# Patient Record
Sex: FEMALE | Race: WHITE | Employment: UNEMPLOYED | URBAN - METROPOLITAN AREA
[De-identification: names, ages, dates, MRNs, and addresses within clinical notes are randomized per-mention and may not be internally consistent; named-entity substitution may affect disease eponyms.]

---

## 2019-07-15 ENCOUNTER — APPOINTMENT (OUTPATIENT)
Dept: GENERAL RADIOLOGY | Age: 69
End: 2019-07-15
Attending: EMERGENCY MEDICINE
Payer: COMMERCIAL

## 2019-07-15 ENCOUNTER — HOSPITAL ENCOUNTER (EMERGENCY)
Age: 69
Discharge: HOME | End: 2019-07-15
Attending: EMERGENCY MEDICINE
Payer: COMMERCIAL

## 2019-07-15 VITALS
RESPIRATION RATE: 19 BRPM | WEIGHT: 200 LBS | HEIGHT: 64 IN | TEMPERATURE: 98.9 F | DIASTOLIC BLOOD PRESSURE: 82 MMHG | SYSTOLIC BLOOD PRESSURE: 179 MMHG | OXYGEN SATURATION: 96 % | BODY MASS INDEX: 34.15 KG/M2 | HEART RATE: 88 BPM

## 2019-07-15 DIAGNOSIS — R11.2 NAUSEA AND VOMITING, INTRACTABILITY OF VOMITING NOT SPECIFIED, UNSPECIFIED VOMITING TYPE: ICD-10-CM

## 2019-07-15 DIAGNOSIS — E86.0 DEHYDRATION: ICD-10-CM

## 2019-07-15 DIAGNOSIS — R73.9 HYPERGLYCEMIA: Primary | ICD-10-CM

## 2019-07-15 LAB
ALBUMIN SERPL-MCNC: 4.1 G/DL (ref 3.5–5)
ALBUMIN/GLOB SERPL: 1 {RATIO} (ref 1.1–2.2)
ALP SERPL-CCNC: 92 U/L (ref 45–117)
ALT SERPL-CCNC: 25 U/L (ref 12–78)
ANION GAP SERPL CALC-SCNC: 11 MMOL/L (ref 5–15)
APPEARANCE UR: CLEAR
AST SERPL-CCNC: 26 U/L (ref 15–37)
BACTERIA URNS QL MICRO: NEGATIVE /HPF
BASE EXCESS BLDV CALC-SCNC: 1.8 MMOL/L
BASOPHILS # BLD: 0 K/UL (ref 0–0.1)
BASOPHILS NFR BLD: 0 % (ref 0–1)
BDY SITE: ABNORMAL
BILIRUB SERPL-MCNC: 0.8 MG/DL (ref 0.2–1)
BILIRUB UR QL: NEGATIVE
BUN SERPL-MCNC: 39 MG/DL (ref 6–20)
BUN/CREAT SERPL: 20 (ref 12–20)
CALCIUM SERPL-MCNC: 10.2 MG/DL (ref 8.5–10.1)
CHLORIDE SERPL-SCNC: 98 MMOL/L (ref 97–108)
CO2 SERPL-SCNC: 27 MMOL/L (ref 21–32)
COLOR UR: ABNORMAL
COMMENT, HOLDF: NORMAL
CREAT SERPL-MCNC: 1.93 MG/DL (ref 0.55–1.02)
DIFFERENTIAL METHOD BLD: ABNORMAL
EOSINOPHIL # BLD: 0 K/UL (ref 0–0.4)
EOSINOPHIL NFR BLD: 0 % (ref 0–7)
EPITH CASTS URNS QL MICRO: ABNORMAL /LPF
ERYTHROCYTE [DISTWIDTH] IN BLOOD BY AUTOMATED COUNT: 13.3 % (ref 11.5–14.5)
GLOBULIN SER CALC-MCNC: 4.3 G/DL (ref 2–4)
GLUCOSE BLD STRIP.AUTO-MCNC: 343 MG/DL (ref 65–100)
GLUCOSE BLD STRIP.AUTO-MCNC: 364 MG/DL (ref 65–100)
GLUCOSE SERPL-MCNC: 374 MG/DL (ref 65–100)
GLUCOSE UR STRIP.AUTO-MCNC: >1000 MG/DL
HCO3 BLDV-SCNC: 27 MMOL/L (ref 23–28)
HCT VFR BLD AUTO: 42.8 % (ref 35–47)
HGB BLD-MCNC: 13.8 G/DL (ref 11.5–16)
HGB UR QL STRIP: ABNORMAL
HYALINE CASTS URNS QL MICRO: ABNORMAL /LPF (ref 0–5)
IMM GRANULOCYTES # BLD AUTO: 0 K/UL (ref 0–0.04)
IMM GRANULOCYTES NFR BLD AUTO: 0 % (ref 0–0.5)
KETONES SERPL QL: NEGATIVE
KETONES UR QL STRIP.AUTO: 15 MG/DL
LEUKOCYTE ESTERASE UR QL STRIP.AUTO: NEGATIVE
LYMPHOCYTES # BLD: 1.2 K/UL (ref 0.8–3.5)
LYMPHOCYTES NFR BLD: 9 % (ref 12–49)
MAGNESIUM SERPL-MCNC: 1.5 MG/DL (ref 1.6–2.4)
MCH RBC QN AUTO: 29.1 PG (ref 26–34)
MCHC RBC AUTO-ENTMCNC: 32.2 G/DL (ref 30–36.5)
MCV RBC AUTO: 90.1 FL (ref 80–99)
MONOCYTES # BLD: 0.7 K/UL (ref 0–1)
MONOCYTES NFR BLD: 5 % (ref 5–13)
NEUTS SEG # BLD: 11 K/UL (ref 1.8–8)
NEUTS SEG NFR BLD: 86 % (ref 32–75)
NITRITE UR QL STRIP.AUTO: NEGATIVE
NRBC # BLD: 0 K/UL (ref 0–0.01)
NRBC BLD-RTO: 0 PER 100 WBC
PCO2 BLDV: 44 MMHG (ref 41–51)
PH BLDV: 7.41 [PH] (ref 7.32–7.42)
PH UR STRIP: 5.5 [PH] (ref 5–8)
PHOSPHATE SERPL-MCNC: 4.6 MG/DL (ref 2.6–4.7)
PLATELET # BLD AUTO: 224 K/UL (ref 150–400)
PMV BLD AUTO: 10.9 FL (ref 8.9–12.9)
PO2 BLDV: 30 MMHG (ref 25–40)
POTASSIUM SERPL-SCNC: 4 MMOL/L (ref 3.5–5.1)
PROT SERPL-MCNC: 8.4 G/DL (ref 6.4–8.2)
PROT UR STRIP-MCNC: 300 MG/DL
RBC # BLD AUTO: 4.75 M/UL (ref 3.8–5.2)
RBC #/AREA URNS HPF: ABNORMAL /HPF (ref 0–5)
SAMPLES BEING HELD,HOLD: NORMAL
SAO2 % BLDV: 58 % (ref 65–88)
SAO2% DEVICE SAO2% SENSOR NAME: ABNORMAL
SERVICE CMNT-IMP: ABNORMAL
SERVICE CMNT-IMP: ABNORMAL
SODIUM SERPL-SCNC: 136 MMOL/L (ref 136–145)
SP GR UR REFRACTOMETRY: 1.02 (ref 1–1.03)
SPECIMEN SITE: ABNORMAL
UR CULT HOLD, URHOLD: NORMAL
UROBILINOGEN UR QL STRIP.AUTO: 0.2 EU/DL (ref 0.2–1)
WBC # BLD AUTO: 12.9 K/UL (ref 3.6–11)
WBC URNS QL MICRO: ABNORMAL /HPF (ref 0–4)

## 2019-07-15 PROCEDURE — 96374 THER/PROPH/DIAG INJ IV PUSH: CPT

## 2019-07-15 PROCEDURE — 74011250636 HC RX REV CODE- 250/636: Performed by: EMERGENCY MEDICINE

## 2019-07-15 PROCEDURE — 85025 COMPLETE CBC W/AUTO DIFF WBC: CPT

## 2019-07-15 PROCEDURE — 80053 COMPREHEN METABOLIC PANEL: CPT

## 2019-07-15 PROCEDURE — 36415 COLL VENOUS BLD VENIPUNCTURE: CPT

## 2019-07-15 PROCEDURE — 81001 URINALYSIS AUTO W/SCOPE: CPT

## 2019-07-15 PROCEDURE — 96361 HYDRATE IV INFUSION ADD-ON: CPT

## 2019-07-15 PROCEDURE — 71045 X-RAY EXAM CHEST 1 VIEW: CPT

## 2019-07-15 PROCEDURE — 84100 ASSAY OF PHOSPHORUS: CPT

## 2019-07-15 PROCEDURE — 82962 GLUCOSE BLOOD TEST: CPT

## 2019-07-15 PROCEDURE — 82803 BLOOD GASES ANY COMBINATION: CPT

## 2019-07-15 PROCEDURE — 82009 KETONE BODYS QUAL: CPT

## 2019-07-15 PROCEDURE — 83735 ASSAY OF MAGNESIUM: CPT

## 2019-07-15 PROCEDURE — 99285 EMERGENCY DEPT VISIT HI MDM: CPT

## 2019-07-15 RX ORDER — OLMESARTAN MEDOXOMIL AND HYDROCHLOROTHIAZIDE 40/25 40; 25 MG/1; MG/1
1 TABLET ORAL DAILY
COMMUNITY

## 2019-07-15 RX ORDER — SODIUM CHLORIDE 0.9 % (FLUSH) 0.9 %
5-40 SYRINGE (ML) INJECTION EVERY 8 HOURS
Status: DISCONTINUED | OUTPATIENT
Start: 2019-07-15 | End: 2019-07-15 | Stop reason: HOSPADM

## 2019-07-15 RX ORDER — ROSUVASTATIN CALCIUM 40 MG/1
40 TABLET, COATED ORAL
COMMUNITY

## 2019-07-15 RX ORDER — ONDANSETRON 4 MG/1
4 TABLET, ORALLY DISINTEGRATING ORAL
Qty: 20 TAB | Refills: 0 | Status: SHIPPED | OUTPATIENT
Start: 2019-07-15

## 2019-07-15 RX ORDER — SODIUM CHLORIDE 0.9 % (FLUSH) 0.9 %
5-40 SYRINGE (ML) INJECTION AS NEEDED
Status: DISCONTINUED | OUTPATIENT
Start: 2019-07-15 | End: 2019-07-15 | Stop reason: HOSPADM

## 2019-07-15 RX ORDER — ONDANSETRON 2 MG/ML
4 INJECTION INTRAMUSCULAR; INTRAVENOUS
Status: COMPLETED | OUTPATIENT
Start: 2019-07-15 | End: 2019-07-15

## 2019-07-15 RX ADMIN — SODIUM CHLORIDE 1000 ML: 900 INJECTION, SOLUTION INTRAVENOUS at 03:53

## 2019-07-15 RX ADMIN — ONDANSETRON 4 MG: 2 INJECTION INTRAMUSCULAR; INTRAVENOUS at 03:54

## 2019-07-15 RX ADMIN — SODIUM CHLORIDE 1000 ML: 900 INJECTION, SOLUTION INTRAVENOUS at 06:08

## 2019-07-15 NOTE — ED NOTES
To Toth has reviewed discharge instructions with the patient and spouse. The patient and spouse verbalized understanding instructions and need for follow up with primary care provider. Pt is not in any current distress and shows no evidence of clinical instability. Pt left ambulatory. Pt family/friends are present and pt left with all personal belongings. Pt states chief complaint has improved with treatment provided. Pt is alert and oriented to time, place, person and situation, pt hemodynamically/respiratorily stable. Paperwork given and reviewed with patient and their spouse, opportunity for questions/clarification given.      Visit Vitals  /82   Pulse 88   Temp 98.9 °F (37.2 °C)   Resp 19   Ht 5' 4\" (1.626 m)   Wt 90.7 kg (200 lb)   SpO2 96%   BMI 34.33 kg/m²

## 2019-07-15 NOTE — ED NOTES
Bedside shift change report given to Lisa Hess (oncoming nurse) by Ricky Guerrero (offgoing nurse). Report included the following information SBAR.

## 2019-07-15 NOTE — ED NOTES
2nd liter of fluid started per orders. IV site without signs of infiltration or infection but it is a little positional.  Will continue to monitor closely.

## 2019-07-15 NOTE — DISCHARGE INSTRUCTIONS
We hope that we have addressed all of your medical concerns. The examination and treatment you received in the Emergency Department were for an emergent problem and were not intended as complete care. It is important that you follow up with your healthcare provider(s) for ongoing care. If your symptoms worsen or do not improve as expected, and you are unable to reach your usual health care provider(s), you should return to the Emergency Department. Today's healthcare is undergoing tremendous change, and patient satisfaction surveys are one of the many tools to assess the quality of medical care. You may receive a survey from the [employer] regarding your experience in the Emergency Department. I hope that your experience has been completely positive, particularly the medical care that I provided. As such, please participate in the survey; anything less than excellent does not meet my expectations or intentions. Formerly [facility] and [facility] participate in nationally recognized quality of care measures. If your blood pressure is greater than 120/80, as reported below, we urge that you seek medical care to address the potential of high blood pressure, commonly known as hypertension. Hypertension can be hereditary or can be caused by certain medical conditions, pain, stress, or \"white coat syndrome. \"       Please make an appointment with your health care provider(s) for follow up of your Emergency Department visit.        VITALS:   Patient Vitals for the past 8 hrs:   Temp Pulse Resp BP SpO2   07/15/19 0645 -- 92 19 171/81 97 %   07/15/19 0630 -- 92 18 182/81 97 %   07/15/19 0600 -- 87 17 182/78 97 %   07/15/19 0500 -- 85 21 182/78 95 %   07/15/19 0430 -- 88 21 173/77 94 %   07/15/19 0415 -- 89 20 180/78 94 %   07/15/19 0403 -- -- -- -- (!) 88 %   07/15/19 0330 98.9 °F (37.2 °C) 95 18 133/63 94 %          Thank you for allowing us to provide you with medical care today. We realize that you have many choices for your emergency care needs. Please choose us in the future for any continued health care needs. Breana Ortiz [address] [place] [address].   Office: [phone]            Recent Results (from the past 24 hour(s))   CBC WITH AUTOMATED DIFF    Collection Time: 07/15/19  3:45 AM   Result Value Ref Range    WBC 12.9 (H) 3.6 - 11.0 K/uL    RBC 4.75 3.80 - 5.20 M/uL    HGB 13.8 11.5 - 16.0 g/dL    HCT 42.8 35.0 - 47.0 %    MCV 90.1 80.0 - 99.0 FL    MCH 29.1 26.0 - 34.0 PG    MCHC 32.2 30.0 - 36.5 g/dL    RDW 13.3 11.5 - 14.5 %    PLATELET 758 584 - 993 K/uL    MPV 10.9 8.9 - 12.9 FL    NRBC 0.0 0  WBC    ABSOLUTE NRBC 0.00 0.00 - 0.01 K/uL    NEUTROPHILS 86 (H) 32 - 75 %    LYMPHOCYTES 9 (L) 12 - 49 %    MONOCYTES 5 5 - 13 %    EOSINOPHILS 0 0 - 7 %    BASOPHILS 0 0 - 1 %    IMMATURE GRANULOCYTES 0 0.0 - 0.5 %    ABS. NEUTROPHILS 11.0 (H) 1.8 - 8.0 K/UL    ABS. LYMPHOCYTES 1.2 0.8 - 3.5 K/UL    ABS. MONOCYTES 0.7 0.0 - 1.0 K/UL    ABS. EOSINOPHILS 0.0 0.0 - 0.4 K/UL    ABS. BASOPHILS 0.0 0.0 - 0.1 K/UL    ABS. IMM. GRANS. 0.0 0.00 - 0.04 K/UL    DF AUTOMATED     METABOLIC PANEL, COMPREHENSIVE    Collection Time: 07/15/19  3:45 AM   Result Value Ref Range    Sodium 136 136 - 145 mmol/L    Potassium 4.0 3.5 - 5.1 mmol/L    Chloride 98 97 - 108 mmol/L    CO2 27 21 - 32 mmol/L    Anion gap 11 5 - 15 mmol/L    Glucose 374 (H) 65 - 100 mg/dL    BUN 39 (H) 6 - 20 MG/DL    Creatinine 1.93 (H) 0.55 - 1.02 MG/DL    BUN/Creatinine ratio 20 12 - 20      GFR est AA 31 (L) >60 ml/min/1.73m2    GFR est non-AA 26 (L) >60 ml/min/1.73m2    Calcium 10.2 (H) 8.5 - 10.1 MG/DL    Bilirubin, total 0.8 0.2 - 1.0 MG/DL    ALT (SGPT) 25 12 - 78 U/L    AST (SGOT) 26 15 - 37 U/L    Alk.  phosphatase 92 45 - 117 U/L    Protein, total 8.4 (H) 6.4 - 8.2 g/dL    Albumin 4.1 3.5 - 5.0 g/dL    Globulin 4.3 (H) 2.0 - 4.0 g/dL    A-G Ratio 1.0 (L) 1.1 - 2.2     MAGNESIUM    Collection Time: 07/15/19  3:45 AM   Result Value Ref Range    Magnesium 1.5 (L) 1.6 - 2.4 mg/dL   PHOSPHORUS    Collection Time: 07/15/19  3:45 AM   Result Value Ref Range    Phosphorus 4.6 2.6 - 4.7 MG/DL   SAMPLES BEING HELD    Collection Time: 07/15/19  3:45 AM   Result Value Ref Range    SAMPLES BEING HELD 1RED,1BLU     COMMENT        Add-on orders for these samples will be processed based on acceptable specimen integrity and analyte stability, which may vary by analyte. ACETONE/KETONE, QL    Collection Time: 07/15/19  3:45 AM   Result Value Ref Range    Acetone/Ketone serum, QL. NEGATIVE  NEG        VENOUS BLOOD GAS    Collection Time: 07/15/19  3:45 AM   Result Value Ref Range    VENOUS PH 7.41 7.32 - 7.42      VENOUS PCO2 44 41 - 51 mmHg    VENOUS PO2 30 25 - 40 mmHg    VENOUS O2 SATURATION 58 (L) 65 - 88 %    VENOUS BICARBONATE 27 23 - 28 mmol/L    VENOUS BASE EXCESS 1.8 mmol/L    O2 METHOD ROOM AIR      Sample source VENOUS      SITE OTHER     GLUCOSE, POC    Collection Time: 07/15/19  3:52 AM   Result Value Ref Range    Glucose (POC) 364 (H) 65 - 100 mg/dL    Performed by Margart Epley W/MICROSCOPIC    Collection Time: 07/15/19  4:48 AM   Result Value Ref Range    Color YELLOW/STRAW      Appearance CLEAR CLEAR      Specific gravity 1.021 1.003 - 1.030      pH (UA) 5.5 5.0 - 8.0      Protein 300 (A) NEG mg/dL    Glucose >1,000 (A) NEG mg/dL    Ketone 15 (A) NEG mg/dL    Bilirubin NEGATIVE  NEG      Blood MODERATE (A) NEG      Urobilinogen 0.2 0.2 - 1.0 EU/dL    Nitrites NEGATIVE  NEG      Leukocyte Esterase NEGATIVE  NEG      WBC 0-4 0 - 4 /hpf    RBC 0-5 0 - 5 /hpf    Epithelial cells FEW FEW /lpf    Bacteria NEGATIVE  NEG /hpf    Hyaline cast 2-5 0 - 5 /lpf   URINE CULTURE HOLD SAMPLE    Collection Time: 07/15/19  4:48 AM   Result Value Ref Range    Urine culture hold        URINE ON HOLD IN MICROBIOLOGY DEPT FOR 3 DAYS.  IF UNPRESERVED URINE IS SUBMITTED, IT CANNOT BE USED FOR ADDITIONAL TESTING AFTER 24 HRS, RECOLLECTION WILL BE REQUIRED. GLUCOSE, POC    Collection Time: 07/15/19  5:56 AM   Result Value Ref Range    Glucose (POC) 343 (H) 65 - 100 mg/dL    Performed by Johnna Morris        Xr Chest Port    Result Date: 7/15/2019  EXAM: XR CHEST PORT INDICATION: Vomiting and hyperglycemia. COMPARISON: Portable chest on 2/9/2013 TECHNIQUE: Upright portable chest AP view FINDINGS: Cardiac monitoring wires overlie the thorax. The cardiomediastinal and hilar contours are within normal limits. The pulmonary vasculature is within normal limits. The lungs and pleural spaces are clear. The visualized bones and upper abdomen are age-appropriate. IMPRESSION: No acute process on portable chest.         Patient Education        Learning About High Blood Sugar  What is high blood sugar? Your body turns the food you eat into glucose (sugar), which it uses for energy. But if your body isn't able to use the sugar right away, it can build up in your blood and lead to high blood sugar. When the amount of sugar in your blood stays too high for too much of the time, you may have diabetes. Diabetes is a disease that can cause serious health problems. The good news is that lifestyle changes may help you get your blood sugar back to normal and avoid or delay diabetes. What causes high blood sugar? Sugar (glucose) can build up in your blood if you:  · Are overweight. · Have a family history of diabetes. · Take certain medicines, such as steroids. What are the symptoms? Having high blood sugar may not cause any symptoms at all. Or it may make you feel very thirsty or very hungry. You may also urinate more often than usual, have blurry vision, or lose weight without trying. How is high blood sugar treated? You can take steps to lower your blood sugar level if you understand what makes it get higher.  Your doctor may want you to learn how to test your blood sugar level at home. Then you can see how illness, stress, or different kinds of food or medicine raise or lower your blood sugar level. Other tests may be needed to see if you have diabetes. How can you prevent high blood sugar? · Watch your weight. If you're overweight, losing just a small amount of weight may help. Reducing fat around your waist is most important. · Limit the amount of calories, sweets, and unhealthy fat you eat. Ask your doctor if a dietitian can help you. A registered dietitian can help you create meal plans that fit your lifestyle. · Get at least 30 minutes of exercise on most days of the week. Exercise helps control your blood sugar. It also helps you maintain a healthy weight. Walking is a good choice. You also may want to do other activities, such as running, swimming, cycling, or playing tennis or team sports. · If your doctor prescribed medicines, take them exactly as prescribed. Call your doctor if you think you are having a problem with your medicine. You will get more details on the specific medicines your doctor prescribes. Follow-up care is a key part of your treatment and safety. Be sure to make and go to all appointments, and call your doctor if you are having problems. It's also a good idea to know your test results and keep a list of the medicines you take. Where can you learn more? Go to http://meliton-iman.info/. Enter O108 in the search box to learn more about \"Learning About High Blood Sugar. \"  Current as of: July 25, 2018  Content Version: 11.9  © 2629-9076 [employer]. Care instructions adapted under license by [employer] (which disclaims liability or warranty for this information). If you have questions about a medical condition or this instruction, always ask your healthcare professional. [address] any warranty or liability for your use of this information.          Patient Education        Nausea and Vomiting: Care Instructions  Your Care Instructions    When you are nauseated, you may feel weak and sweaty and notice a lot of saliva in your mouth. Nausea often leads to vomiting. Most of the time you do not need to worry about nausea and vomiting, but they can be signs of other illnesses. Two common causes of nausea and vomiting are stomach flu and food poisoning. Nausea and vomiting from viral stomach flu will usually start to improve within 24 hours. Nausea and vomiting from food poisoning may last from 12 to 48 hours. The doctor has checked you carefully, but problems can develop later. If you notice any problems or new symptoms, get medical treatment right away. Follow-up care is a key part of your treatment and safety. Be sure to make and go to all appointments, and call your doctor if you are having problems. It's also a good idea to know your test results and keep a list of the medicines you take. How can you care for yourself at home? · To prevent dehydration, drink plenty of fluids, enough so that your urine is light yellow or clear like water. Choose water and other caffeine-free clear liquids until you feel better. If you have kidney, heart, or liver disease and have to limit fluids, talk with your doctor before you increase the amount of fluids you drink. · Rest in bed until you feel better. · When you are able to eat, try clear soups, mild foods, and liquids until all symptoms are gone for 12 to 48 hours. Other good choices include dry toast, crackers, cooked cereal, and gelatin dessert, such as Jell-O. When should you call for help? Call 911 anytime you think you may need emergency care. For example, call if:    · You passed out (lost consciousness).    Call your doctor now or seek immediate medical care if:    · You have symptoms of dehydration, such as:  ? Dry eyes and a dry mouth. ? Passing only a little dark urine. ?  Feeling thirstier than usual.     · You have new or worsening belly pain.     · You have a new or higher fever.     · You vomit blood or what looks like coffee grounds.    Watch closely for changes in your health, and be sure to contact your doctor if:    · You have ongoing nausea and vomiting.     · Your vomiting is getting worse.     · Your vomiting lasts longer than 2 days.     · You are not getting better as expected. Where can you learn more? Go to http://meliton-iman.info/. Enter 25 [id number] in the search box to learn more about \"Nausea and Vomiting: Care Instructions. \"  Current as of: September 23, 2018  Content Version: 11.9  © 5920-5916 [employer]. Care instructions adapted under license by [employer] (which disclaims liability or warranty for this information). If you have questions about a medical condition or this instruction, always ask your healthcare professional. [address] any warranty or liability for your use of this information. Patient Education        Dehydration: Care Instructions  Your Care Instructions  Dehydration happens when your body loses too much fluid. This might happen when you do not drink enough water or you lose large amounts of fluids from your body because of diarrhea, vomiting, or sweating. Severe dehydration can be life-threatening. Water and minerals called electrolytes help put your body fluids back in balance. Learn the early signs of fluid loss, and drink more fluids to prevent dehydration. Follow-up care is a key part of your treatment and safety. Be sure to make and go to all appointments, and call your doctor if you are having problems. It's also a good idea to know your test results and keep a list of the medicines you take. How can you care for yourself at home? · To prevent dehydration, drink plenty of fluids, enough so that your urine is light yellow or clear like water. Choose water and other caffeine-free clear liquids until you feel better.  If you have kidney, heart, or liver disease and have to limit fluids, talk with your doctor before you increase the amount of fluids you drink. · If you do not feel like eating or drinking, try taking small sips of water, sports drinks, or other rehydration drinks. · Get plenty of rest.  To prevent dehydration  · Add more fluids to your diet and daily routine, unless your doctor has told you not to. · During hot weather, drink more fluids. Drink even more fluids if you exercise a lot. Stay away from drinks with alcohol or caffeine. · Watch for the symptoms of dehydration. These include:  ? A dry, sticky mouth. ? Dark yellow urine, and not much of it. ? Dry and sunken eyes. ? Feeling very tired. · Learn what problems can lead to dehydration. These include:  ? Diarrhea, fever, and vomiting. ? Any illness with a fever, such as pneumonia or the flu. ? Activities that cause heavy sweating, such as endurance races and heavy outdoor work in hot or humid weather. ? Alcohol or drug abuse or withdrawal.  ? Certain medicines, such as cold and allergy pills (antihistamines), diet pills (diuretics), and laxatives. ? Certain diseases, such as diabetes, cancer, and heart or kidney disease. When should you call for help? Call 911 anytime you think you may need emergency care. For example, call if:    · You passed out (lost consciousness).    Call your doctor now or seek immediate medical care if:    · You are confused and cannot think clearly.     · You are dizzy or lightheaded, or you feel like you may faint.     · You have signs of needing more fluids. You have sunken eyes and a dry mouth, and you pass only a little dark urine.     · You cannot keep fluids down.    Watch closely for changes in your health, and be sure to contact your doctor if:    · You are not making tears.     · Your skin is very dry and sags slowly back into place after you pinch it.     · Your mouth and eyes are very dry. Where can you learn more?   Go to http://meliton-iman.info/. Enter T199 in the search box to learn more about \"Dehydration: Care Instructions. \"  Current as of: September 23, 2018  Content Version: 11.9  © 2595-4234 [employer]. Care instructions adapted under license by [employer] (which disclaims liability or warranty for this information). If you have questions about a medical condition or this instruction, always ask your healthcare professional. [place] [address] any warranty or liability for your use of this information. Patient Education        Oral Rehydration: Care Instructions  Your Care Instructions    Dehydration occurs when your body loses too much water. This can happen if you do not drink enough fluids or lose a lot of fluid due to diarrhea, vomiting, or sweating. Being dehydrated can cause health problems and can even be life-threatening. To replace lost fluids, you need to drink liquid that contains special chemicals called electrolytes. Electrolytes keep your body working well. Plain water does not have electrolytes. You also need to rest to prevent more fluid loss. Replacing water and electrolytes (oral rehydration) completely takes about 36 hours. But you should feel better within a few hours. Follow-up care is a key part of your treatment and safety. Be sure to make and go to all appointments, and call your doctor if you are having problems. It's also a good idea to know your test results and keep a list of the medicines you take. How can you care for yourself at home? · Take frequent sips of a drink such as Gatorade, Powerade, or other rehydration drinks that your doctor suggests. These replace both fluid and important chemicals (electrolytes) you need for balance in your blood. · Drink 2 quarts of cool liquid over 2 to 4 hours. You should have at least 10 glasses of liquid a day to replace lost fluid.  If you have kidney, heart, or liver disease and have to limit fluids, talk with your doctor before you increase the amount of fluids you drink. · Make your own drink. Measure everything carefully. The drink may not work well or may even be harmful if the amounts are off. ? 1 quart water  ? ½ teaspoon salt  ? 6 teaspoons sugar  · Do not drink liquid with caffeine, such as coffee and kim. · Do not drink any alcohol. It can make you dehydrated. · Drink plenty of fluids, enough so that your urine is light yellow or clear like water. If you have kidney, heart, or liver disease and have to limit fluids, talk with your doctor before you increase the amount of fluids you drink. When should you call for help? Call 911 anytime you think you may need emergency care. For example, call if:    · You have signs of severe dehydration, such as:  ? You are confused or unable to stay awake.  ? You passed out (lost consciousness).    Call your doctor now or seek immediate medical care if:    · You still have signs of dehydration. You have sunken eyes and a dry mouth, and you pass only a little dark urine.     · You are dizzy or lightheaded, or you feel like you may faint.     · You are not able to keep down fluids.    Watch closely for changes in your health, and be sure to contact your doctor if:    · You do not get better as expected. Where can you learn more? Go to http://meliton-iman.info/. Enter I040 in the search box to learn more about \"Oral Rehydration: Care Instructions. \"  Current as of: September 23, 2018  Content Version: 11.9  © 3368-3505 [employer]. Care instructions adapted under license by [employer] (which disclaims liability or warranty for this information). If you have questions about a medical condition or this instruction, always ask your healthcare professional. [address] any warranty or liability for your use of this information.

## 2019-07-15 NOTE — ED PROVIDER NOTES
80 y.o. female with past medical history significant for HTN, DM, ARF, PAF, and duodenal ulcer disease presents ambulatory and accompanied by  and son with chief complaint of nausea, vomiting, and an elevated BG of 525, onset just 30 minutes PTA.  explains that the pt had a BG of 475 around 4 PM, and was subsequently given 10 units of Humalog and 32 units of Novolin. He reports that she then had 1/2 of a coke and some crackers, wherein she became nauseous and started vomiting.  continues to explain that the pt's BG improved to a 375 around 6 PM, adding that nothing was given at that time. Around 8 PM, however, the pt's spouse reports that her BG increased to 425, prompting him to administer 10 units of fast-acting insulin and 32 units of slow-acting insulin. He further indicates that her BG then increased to the 525, wherein he administered another 10 units of fast-acting and 32 units of slow-acting insulin. Pt denies any other symptoms at this time. There are no other acute medical concerns at this time. Social hx: Patient denies Tobacco use. Denies EtOH use. Denies illicit drug abuse. PCP: Taiwo Phillips MD    Note written by Joanie Puentes, as dictated by Henry Multani,  3:44 AM      The history is provided by the patient and the spouse. No  was used.         Past Medical History:   Diagnosis Date    [facility]acute renal failure) [facility] [address]     2/11/13 - had a viral gastroenteritis     Diabetes ([facility] [address]     Duodenal ulcer disease     EGD 2/13 - aspirin stopped afterwards    Hypertension     Leg edema     PAF (paroxysmal atrial fibrillation) ([facility])     1 hour epsiode 2/11/13       Past Surgical History:   Procedure Laterality Date    HX OTHER SURGICAL      Echo 2/11/13 - mild LVH, EF 60%         Family History:   Problem Relation Age of Onset    Other Other         patient does not know    Coronary Artery Disease Father        Social History Socioeconomic History    Marital status:      Spouse name: Not on file    Number of children: Not on file    Years of education: Not on file    Highest education level: Not on file   Occupational History    Not on file   Social Needs    Financial resource strain: Not on file    Food insecurity:     Worry: Not on file     Inability: Not on file    Transportation needs:     Medical: Not on file     Non-medical: Not on file   Tobacco Use    Smoking status: Never Smoker   Substance and Sexual Activity    Alcohol use: No    Drug use: No    Sexual activity: Not on file   Lifestyle    Physical activity:     Days per week: Not on file     Minutes per session: Not on file    Stress: Not on file   Relationships    Social connections:     Talks on phone: Not on file     Gets together: Not on file     Attends [employer] service: Not on file     Active member of club or organization: Not on file     Attends meetings of clubs or organizations: Not on file     Relationship status: Not on file    Intimate partner violence:     Fear of current or ex partner: Not on file     Emotionally abused: Not on file     Physically abused: Not on file     Forced sexual activity: Not on file   Other Topics Concern    Not on file   Social History Narrative    Not on file         ALLERGIES: Patient has no known allergies. Review of Systems   Constitutional: Negative for appetite change, chills, fever and unexpected weight change. HENT: Negative for ear pain, hearing loss, rhinorrhea and trouble swallowing. Eyes: Negative for pain and visual disturbance. Respiratory: Negative for cough, chest tightness and shortness of breath. Cardiovascular: Negative for chest pain and palpitations. Gastrointestinal: Positive for nausea and vomiting. Negative for abdominal distention, abdominal pain and blood in stool. Genitourinary: Negative for dysuria, hematuria and urgency.    Musculoskeletal: Negative for back pain and myalgias. Skin: Negative for rash. Neurological: Negative for dizziness, syncope, weakness and numbness. Psychiatric/Behavioral: Negative for confusion and suicidal ideas. All other systems reviewed and are negative. Vitals:    07/15/19 0500 07/15/19 0600 07/15/19 0630 07/15/19 0645   BP: 182/78 182/78 182/81 171/81   Pulse: 85 87 92 92   Resp: 21 17 18 19   Temp:       SpO2: 95% 97% 97% 97%   Weight:       Height:                Physical Exam   Constitutional: She is oriented to person, place, and time. She appears well-developed and well-nourished. No distress. HENT:   Head: Normocephalic and atraumatic. Right Ear: External ear normal.   Left Ear: External ear normal.   Nose: Nose normal.   Mouth/Throat: Mucous membranes are dry. No oropharyngeal exudate, posterior oropharyngeal edema or posterior oropharyngeal erythema. Eyes: Pupils are equal, round, and reactive to light. Conjunctivae and EOM are normal. Right eye exhibits no discharge. Left eye exhibits no discharge. No scleral icterus. Neck: Normal range of motion. Neck supple. No JVD present. No tracheal deviation present. Cardiovascular: Normal rate, regular rhythm, normal heart sounds and intact distal pulses. Exam reveals no gallop and no friction rub. No murmur heard. Pulmonary/Chest: Effort normal and breath sounds normal. No stridor. No respiratory distress. She has no decreased breath sounds. She has no wheezes. She has no rhonchi. She has no rales. She exhibits no tenderness. Abdominal: Soft. Bowel sounds are normal. She exhibits no distension. There is generalized tenderness (mild). There is no rebound and no guarding. Musculoskeletal: Normal range of motion. She exhibits no edema or tenderness. Neurological: She is alert and oriented to person, place, and time. She has normal strength and normal reflexes. She displays normal reflexes. No cranial nerve deficit or sensory deficit. She exhibits normal muscle tone. Coordination normal. GCS eye subscore is 4. GCS verbal subscore is 5. GCS motor subscore is 6. Skin: Skin is warm and dry. No rash noted. She is not diaphoretic. No erythema. No pallor. Psychiatric: She has a normal mood and affect. Her behavior is normal. Judgment and thought content normal.   Nursing note and vitals reviewed. Note written by Olivia Abbott, as dictated by [facility], DO 3:44 AM     MDM       Procedures    Chief Complaint   Patient presents with    Vomiting    High Blood Sugar       The patient's presenting problems have been discussed, and they are in agreement with the care plan formulated and outlined with them. I have encouraged them to ask questions as they arise throughout their visit. MEDICATIONS GIVEN:  Medications   sodium chloride (NS) flush 5-40 mL (has no administration in time range)   sodium chloride (NS) flush 5-40 mL (has no administration in time range)   sodium chloride 0.9 % bolus infusion 1,000 mL (1,000 mL IntraVENous New Bag 7/15/19 0608)   sodium chloride 0.9 % bolus infusion 1,000 mL (0 mL IntraVENous IV Completed 7/15/19 0603)   ondansetron (ZOFRAN) injection 4 mg (4 mg IntraVENous Given 7/15/19 0354)       LABS REVIEWED:  Recent Results (from the past 24 hour(s))   CBC WITH AUTOMATED DIFF    Collection Time: 07/15/19  3:45 AM   Result Value Ref Range    WBC 12.9 (H) 3.6 - 11.0 K/uL    RBC 4.75 3.80 - 5.20 M/uL    HGB 13.8 11.5 - 16.0 g/dL    HCT 42.8 35.0 - 47.0 %    MCV 90.1 80.0 - 99.0 FL    MCH 29.1 26.0 - 34.0 PG    MCHC 32.2 30.0 - 36.5 g/dL    RDW 13.3 11.5 - 14.5 %    PLATELET 368 738 - 688 K/uL    MPV 10.9 8.9 - 12.9 FL    NRBC 0.0 0  WBC    ABSOLUTE NRBC 0.00 0.00 - 0.01 K/uL    NEUTROPHILS 86 (H) 32 - 75 %    LYMPHOCYTES 9 (L) 12 - 49 %    MONOCYTES 5 5 - 13 %    EOSINOPHILS 0 0 - 7 %    BASOPHILS 0 0 - 1 %    IMMATURE GRANULOCYTES 0 0.0 - 0.5 %    ABS. NEUTROPHILS 11.0 (H) 1.8 - 8.0 K/UL    ABS.  LYMPHOCYTES 1.2 0.8 - 3.5 K/UL    ABS. MONOCYTES 0.7 0.0 - 1.0 K/UL    ABS. EOSINOPHILS 0.0 0.0 - 0.4 K/UL    ABS. BASOPHILS 0.0 0.0 - 0.1 K/UL    ABS. IMM. GRANS. 0.0 0.00 - 0.04 K/UL    DF AUTOMATED     METABOLIC PANEL, COMPREHENSIVE    Collection Time: 07/15/19  3:45 AM   Result Value Ref Range    Sodium 136 136 - 145 mmol/L    Potassium 4.0 3.5 - 5.1 mmol/L    Chloride 98 97 - 108 mmol/L    CO2 27 21 - 32 mmol/L    Anion gap 11 5 - 15 mmol/L    Glucose 374 (H) 65 - 100 mg/dL    BUN 39 (H) 6 - 20 MG/DL    Creatinine 1.93 (H) 0.55 - 1.02 MG/DL    BUN/Creatinine ratio 20 12 - 20      GFR est AA 31 (L) >60 ml/min/1.73m2    GFR est non-AA 26 (L) >60 ml/min/1.73m2    Calcium 10.2 (H) 8.5 - 10.1 MG/DL    Bilirubin, total 0.8 0.2 - 1.0 MG/DL    ALT (SGPT) 25 12 - 78 U/L    AST (SGOT) 26 15 - 37 U/L    Alk. phosphatase 92 45 - 117 U/L    Protein, total 8.4 (H) 6.4 - 8.2 g/dL    Albumin 4.1 3.5 - 5.0 g/dL    Globulin 4.3 (H) 2.0 - 4.0 g/dL    A-G Ratio 1.0 (L) 1.1 - 2.2     MAGNESIUM    Collection Time: 07/15/19  3:45 AM   Result Value Ref Range    Magnesium 1.5 (L) 1.6 - 2.4 mg/dL   PHOSPHORUS    Collection Time: 07/15/19  3:45 AM   Result Value Ref Range    Phosphorus 4.6 2.6 - 4.7 MG/DL   SAMPLES BEING HELD    Collection Time: 07/15/19  3:45 AM   Result Value Ref Range    SAMPLES BEING HELD 1RED,1BLU     COMMENT        Add-on orders for these samples will be processed based on acceptable specimen integrity and analyte stability, which may vary by analyte.    ACETONE/KETONE, Cori Seed    Collection Time: 07/15/19  3:45 AM   Result Value Ref Range    Acetone/Ketone serum, QL. NEGATIVE  NEG        VENOUS BLOOD GAS    Collection Time: 07/15/19  3:45 AM   Result Value Ref Range    VENOUS PH 7.41 7.32 - 7.42      VENOUS PCO2 44 41 - 51 mmHg    VENOUS PO2 30 25 - 40 mmHg    VENOUS O2 SATURATION 58 (L) 65 - 88 %    VENOUS BICARBONATE 27 23 - 28 mmol/L    VENOUS BASE EXCESS 1.8 mmol/L    O2 METHOD ROOM AIR      Sample source VENOUS      SITE OTHER     GLUCOSE, POC    Collection Time: 07/15/19  3:52 AM   Result Value Ref Range    Glucose (POC) 364 (H) 65 - 100 mg/dL    Performed by Royal Paul W/MICROSCOPIC    Collection Time: 07/15/19  4:48 AM   Result Value Ref Range    Color YELLOW/STRAW      Appearance CLEAR CLEAR      Specific gravity 1.021 1.003 - 1.030      pH (UA) 5.5 5.0 - 8.0      Protein 300 (A) NEG mg/dL    Glucose >1,000 (A) NEG mg/dL    Ketone 15 (A) NEG mg/dL    Bilirubin NEGATIVE  NEG      Blood MODERATE (A) NEG      Urobilinogen 0.2 0.2 - 1.0 EU/dL    Nitrites NEGATIVE  NEG      Leukocyte Esterase NEGATIVE  NEG      WBC 0-4 0 - 4 /hpf    RBC 0-5 0 - 5 /hpf    Epithelial cells FEW FEW /lpf    Bacteria NEGATIVE  NEG /hpf    Hyaline cast 2-5 0 - 5 /lpf   URINE CULTURE HOLD SAMPLE    Collection Time: 07/15/19  4:48 AM   Result Value Ref Range    Urine culture hold        URINE ON HOLD IN MICROBIOLOGY DEPT FOR 3 DAYS. IF UNPRESERVED URINE IS SUBMITTED, IT CANNOT BE USED FOR ADDITIONAL TESTING AFTER 24 HRS, RECOLLECTION WILL BE REQUIRED. GLUCOSE, POC    Collection Time: 07/15/19  5:56 AM   Result Value Ref Range    Glucose (POC) 343 (H) 65 - 100 mg/dL    Performed by [address]:  Patient Vitals for the past 24 hrs:   Temp Pulse Resp BP SpO2   07/15/19 0645  92 19 171/81 97 %   07/15/19 0630  92 18 182/81 97 %   07/15/19 0600  87 17 182/78 97 %   07/15/19 0500  85 21 182/78 95 %   07/15/19 0430  88 21 173/77 94 %   07/15/19 0415  89 20 180/78 94 %   07/15/19 0403     (!) 88 %   07/15/19 0330 98.9 °F (37.2 °C) 95 18 133/63 94 %       RADIOLOGY RESULTS:  The following have been ordered and reviewed:  Xr Chest Port    Result Date: 7/15/2019  EXAM: XR CHEST PORT INDICATION: Vomiting and hyperglycemia. COMPARISON: Portable chest on 2/9/2013 TECHNIQUE: Upright portable chest AP view FINDINGS: Cardiac monitoring wires overlie the thorax. The cardiomediastinal and hilar contours are within normal limits.  The pulmonary vasculature is within normal limits. The lungs and pleural spaces are clear. The visualized bones and upper abdomen are age-appropriate. IMPRESSION: No acute process on portable chest.     PROGRESS NOTES:  Discussed results and plan with patient and spouse. Patient will be discharged home with PCP follow up. Patient instructed to return to the emergency room for any worsening symptoms or any other concerns. DIAGNOSIS:    1. Hyperglycemia    2. Nausea and vomiting, intractability of vomiting not specified, unspecified vomiting type    3. Dehydration        PLAN:  Follow-up Information     Follow up With Specialties Details Why Contact Info    Barron Hodgkins, MD [facility] Schedule an appointment as soon as possible for a visit  [address] [facility]      Abby Leon MD Nephrology Schedule an appointment as soon as possible for a visit  [address] [facility] [facility] [address]  [facility] [address] [id number] [phone]      [address] [facility] DEPT Emergency Medicine  If symptoms worsen [address] [facility]  [phone]        Current Discharge Medication List      START taking these medications    Details   ondansetron (ZOFRAN ODT) 4 mg disintegrating tablet Take 1 Tab by mouth every eight (8) hours as needed for Nausea. Qty: 20 Tab, Refills: 0         CONTINUE these medications which have NOT CHANGED    Details   rosuvastatin (CRESTOR) 40 mg tablet Take 40 mg by mouth nightly. olmesartan-hydroCHLOROthiazide (BENICAR HCT) 40-25 mg per tablet Take 1 Tab by mouth daily. metoprolol (LOPRESSOR) 25 mg tablet TAKE 1/2 TABLET BY MOUTH TWO (2) TIMES A DAY. Qty: 30 Tab, Refills: 2    Comments: NEEDS MARCH APPT      insulin NPH (NOVOLIN, HUMULIN) 100 unit/mL injection by SubCUTAneous route once. insulin lispro (HUMALOG) 100 unit/mL injection by SubCUTAneous route. fexofenadine (ALLEGRA) 180 mg tablet Take  by mouth daily.       pantoprazole (PROTONIX) 40 mg tablet Take 1 Tab by mouth daily. Qty: 30 Tab, Refills: 0      omega-3 fatty acids-vitamin e (FISH OIL) 1,000 mg cap Take 1 Cap by mouth. Calcium-Cholecalciferol, D3, (CALCIUM 600 WITH VITAMIN D3) 600 mg(1,500mg) -400 unit cap Take  by mouth. OTHER mycardis hct      telmisartan-hydrochlorothiazide (MICARDIS HCT) 80-25 mg per tablet Take 1 Tab by mouth daily. Qty: 30 Tab, Refills: 0      atorvastatin (LIPITOR) 40 mg tablet Take 40 mg by mouth daily. ED COURSE: The patient's hospital course has been uncomplicated.

## 2019-07-15 NOTE — ED NOTES
Dr. Orosco Senters at bedside updating to plan of care and results. Fluids infusing and 200 ml left and then will repeat POC glucose.

## 2019-07-15 NOTE — ED NOTES
Pt up to [facility] to obtain clean catch urine. Assisted back to bed and positioned for comfort.

## 2019-07-19 ENCOUNTER — APPOINTMENT (OUTPATIENT)
Dept: CT IMAGING | Age: 69
DRG: 065 | End: 2019-07-19
Attending: FAMILY MEDICINE
Payer: COMMERCIAL

## 2019-07-19 ENCOUNTER — APPOINTMENT (OUTPATIENT)
Dept: CT IMAGING | Age: 69
End: 2019-07-19
Attending: EMERGENCY MEDICINE
Payer: COMMERCIAL

## 2019-07-19 ENCOUNTER — HOSPITAL ENCOUNTER (INPATIENT)
Age: 69
LOS: 10 days | Discharge: HOME HEALTH | DRG: 065 | End: 2019-07-29
Attending: FAMILY MEDICINE | Admitting: FAMILY MEDICINE
Payer: COMMERCIAL

## 2019-07-19 ENCOUNTER — HOSPITAL ENCOUNTER (EMERGENCY)
Age: 69
Discharge: ANOTHER HOSPITAL | End: 2019-07-19
Attending: EMERGENCY MEDICINE
Payer: COMMERCIAL

## 2019-07-19 VITALS
BODY MASS INDEX: 32.59 KG/M2 | SYSTOLIC BLOOD PRESSURE: 158 MMHG | HEIGHT: 64 IN | WEIGHT: 190.92 LBS | DIASTOLIC BLOOD PRESSURE: 90 MMHG | TEMPERATURE: 98.6 F | HEART RATE: 109 BPM | RESPIRATION RATE: 21 BRPM | OXYGEN SATURATION: 94 %

## 2019-07-19 DIAGNOSIS — I60.9 SAH (SUBARACHNOID HEMORRHAGE) (HCC): ICD-10-CM

## 2019-07-19 DIAGNOSIS — I60.9 SAH (SUBARACHNOID HEMORRHAGE) (HCC): Primary | ICD-10-CM

## 2019-07-19 PROBLEM — I61.9 ICH (INTRACEREBRAL HEMORRHAGE) (HCC): Status: ACTIVE | Noted: 2019-07-19

## 2019-07-19 LAB
ANION GAP SERPL CALC-SCNC: 7 MMOL/L (ref 5–15)
BASOPHILS # BLD: 0 K/UL (ref 0–0.1)
BASOPHILS NFR BLD: 0 % (ref 0–1)
BUN SERPL-MCNC: 50 MG/DL (ref 6–20)
BUN/CREAT SERPL: 29 (ref 12–20)
CALCIUM SERPL-MCNC: 9.7 MG/DL (ref 8.5–10.1)
CHLORIDE SERPL-SCNC: 99 MMOL/L (ref 97–108)
CO2 SERPL-SCNC: 31 MMOL/L (ref 21–32)
COMMENT, HOLDF: NORMAL
CREAT SERPL-MCNC: 1.74 MG/DL (ref 0.55–1.02)
DIFFERENTIAL METHOD BLD: ABNORMAL
EOSINOPHIL # BLD: 0 K/UL (ref 0–0.4)
EOSINOPHIL NFR BLD: 0 % (ref 0–7)
ERYTHROCYTE [DISTWIDTH] IN BLOOD BY AUTOMATED COUNT: 13.5 % (ref 11.5–14.5)
GLUCOSE BLD STRIP.AUTO-MCNC: 168 MG/DL (ref 65–100)
GLUCOSE SERPL-MCNC: 231 MG/DL (ref 65–100)
HCT VFR BLD AUTO: 46 % (ref 35–47)
HGB BLD-MCNC: 15.1 G/DL (ref 11.5–16)
IMM GRANULOCYTES # BLD AUTO: 0.1 K/UL (ref 0–0.04)
IMM GRANULOCYTES NFR BLD AUTO: 1 % (ref 0–0.5)
LYMPHOCYTES # BLD: 1.8 K/UL (ref 0.8–3.5)
LYMPHOCYTES NFR BLD: 15 % (ref 12–49)
MCH RBC QN AUTO: 30.2 PG (ref 26–34)
MCHC RBC AUTO-ENTMCNC: 32.8 G/DL (ref 30–36.5)
MCV RBC AUTO: 92 FL (ref 80–99)
MONOCYTES # BLD: 0.9 K/UL (ref 0–1)
MONOCYTES NFR BLD: 7 % (ref 5–13)
NEUTS SEG # BLD: 8.9 K/UL (ref 1.8–8)
NEUTS SEG NFR BLD: 77 % (ref 32–75)
NRBC # BLD: 0 K/UL (ref 0–0.01)
NRBC BLD-RTO: 0 PER 100 WBC
PLATELET # BLD AUTO: 276 K/UL (ref 150–400)
PMV BLD AUTO: 10.1 FL (ref 8.9–12.9)
POTASSIUM SERPL-SCNC: 3.5 MMOL/L (ref 3.5–5.1)
RBC # BLD AUTO: 5 M/UL (ref 3.8–5.2)
SAMPLES BEING HELD,HOLD: NORMAL
SERVICE CMNT-IMP: ABNORMAL
SODIUM SERPL-SCNC: 137 MMOL/L (ref 136–145)
WBC # BLD AUTO: 11.7 K/UL (ref 3.6–11)

## 2019-07-19 PROCEDURE — 82962 GLUCOSE BLOOD TEST: CPT

## 2019-07-19 PROCEDURE — 74176 CT ABD & PELVIS W/O CONTRAST: CPT

## 2019-07-19 PROCEDURE — 99285 EMERGENCY DEPT VISIT HI MDM: CPT

## 2019-07-19 PROCEDURE — 71250 CT THORAX DX C-: CPT

## 2019-07-19 PROCEDURE — 65610000006 HC RM INTENSIVE CARE

## 2019-07-19 PROCEDURE — 74011250636 HC RX REV CODE- 250/636: Performed by: FAMILY MEDICINE

## 2019-07-19 PROCEDURE — 70496 CT ANGIOGRAPHY HEAD: CPT

## 2019-07-19 PROCEDURE — 74011000250 HC RX REV CODE- 250: Performed by: RADIOLOGY

## 2019-07-19 PROCEDURE — 74011000250 HC RX REV CODE- 250: Performed by: FAMILY MEDICINE

## 2019-07-19 PROCEDURE — 74011000258 HC RX REV CODE- 258: Performed by: FAMILY MEDICINE

## 2019-07-19 PROCEDURE — 74011250636 HC RX REV CODE- 250/636: Performed by: RADIOLOGY

## 2019-07-19 PROCEDURE — 74011250637 HC RX REV CODE- 250/637: Performed by: NURSE PRACTITIONER

## 2019-07-19 PROCEDURE — 94760 N-INVAS EAR/PLS OXIMETRY 1: CPT

## 2019-07-19 PROCEDURE — 36415 COLL VENOUS BLD VENIPUNCTURE: CPT

## 2019-07-19 PROCEDURE — 85025 COMPLETE CBC W/AUTO DIFF WBC: CPT

## 2019-07-19 PROCEDURE — 80048 BASIC METABOLIC PNL TOTAL CA: CPT

## 2019-07-19 PROCEDURE — 74011636320 HC RX REV CODE- 636/320: Performed by: EMERGENCY MEDICINE

## 2019-07-19 PROCEDURE — 93005 ELECTROCARDIOGRAM TRACING: CPT

## 2019-07-19 PROCEDURE — 70450 CT HEAD/BRAIN W/O DYE: CPT

## 2019-07-19 PROCEDURE — 74011636637 HC RX REV CODE- 636/637: Performed by: FAMILY MEDICINE

## 2019-07-19 RX ORDER — NIMODIPINE 30 MG/1
60 CAPSULE, LIQUID FILLED ORAL EVERY 4 HOURS
Status: DISCONTINUED | OUTPATIENT
Start: 2019-07-19 | End: 2019-07-29 | Stop reason: HOSPADM

## 2019-07-19 RX ORDER — SODIUM CHLORIDE 9 MG/ML
125 INJECTION, SOLUTION INTRAVENOUS CONTINUOUS
Status: DISCONTINUED | OUTPATIENT
Start: 2019-07-19 | End: 2019-07-19 | Stop reason: HOSPADM

## 2019-07-19 RX ORDER — NALOXONE HYDROCHLORIDE 0.4 MG/ML
0.4 INJECTION, SOLUTION INTRAMUSCULAR; INTRAVENOUS; SUBCUTANEOUS AS NEEDED
Status: DISCONTINUED | OUTPATIENT
Start: 2019-07-19 | End: 2019-07-29 | Stop reason: HOSPADM

## 2019-07-19 RX ORDER — SODIUM CHLORIDE 9 MG/ML
100 INJECTION, SOLUTION INTRAVENOUS CONTINUOUS
Status: DISCONTINUED | OUTPATIENT
Start: 2019-07-19 | End: 2019-07-19

## 2019-07-19 RX ORDER — CIPROFLOXACIN 250 MG/1
250 TABLET, FILM COATED ORAL 2 TIMES DAILY
COMMUNITY
End: 2019-07-29

## 2019-07-19 RX ORDER — SODIUM CHLORIDE 9 MG/ML
125 INJECTION, SOLUTION INTRAVENOUS CONTINUOUS
Status: DISCONTINUED | OUTPATIENT
Start: 2019-07-19 | End: 2019-07-22

## 2019-07-19 RX ORDER — ACETAMINOPHEN 650 MG/1
650 SUPPOSITORY RECTAL
Status: DISCONTINUED | OUTPATIENT
Start: 2019-07-19 | End: 2019-07-29 | Stop reason: HOSPADM

## 2019-07-19 RX ORDER — INSULIN LISPRO 100 [IU]/ML
INJECTION, SOLUTION INTRAVENOUS; SUBCUTANEOUS EVERY 6 HOURS
Status: DISCONTINUED | OUTPATIENT
Start: 2019-07-19 | End: 2019-07-22

## 2019-07-19 RX ORDER — DEXTROSE 50 % IN WATER (D50W) INTRAVENOUS SYRINGE
25-50 AS NEEDED
Status: DISCONTINUED | OUTPATIENT
Start: 2019-07-19 | End: 2019-07-29 | Stop reason: HOSPADM

## 2019-07-19 RX ORDER — MAGNESIUM SULFATE 100 %
4 CRYSTALS MISCELLANEOUS AS NEEDED
Status: DISCONTINUED | OUTPATIENT
Start: 2019-07-19 | End: 2019-07-29 | Stop reason: HOSPADM

## 2019-07-19 RX ORDER — ONDANSETRON 2 MG/ML
4 INJECTION INTRAMUSCULAR; INTRAVENOUS
Status: DISCONTINUED | OUTPATIENT
Start: 2019-07-19 | End: 2019-07-29 | Stop reason: HOSPADM

## 2019-07-19 RX ORDER — ACETAMINOPHEN 325 MG/1
650 TABLET ORAL
Status: DISCONTINUED | OUTPATIENT
Start: 2019-07-19 | End: 2019-07-29 | Stop reason: HOSPADM

## 2019-07-19 RX ADMIN — SODIUM CHLORIDE 125 ML/HR: 900 INJECTION, SOLUTION INTRAVENOUS at 19:06

## 2019-07-19 RX ADMIN — SODIUM CHLORIDE 1000 ML: 900 INJECTION, SOLUTION INTRAVENOUS at 19:17

## 2019-07-19 RX ADMIN — FAMOTIDINE 20 MG: 10 INJECTION INTRAVENOUS at 21:02

## 2019-07-19 RX ADMIN — NIMODIPINE 60 MG: 30 CAPSULE, LIQUID FILLED ORAL at 20:06

## 2019-07-19 RX ADMIN — INSULIN LISPRO 2 UNITS: 100 INJECTION, SOLUTION INTRAVENOUS; SUBCUTANEOUS at 19:00

## 2019-07-19 RX ADMIN — SODIUM CHLORIDE 125 ML/HR: 900 INJECTION, SOLUTION INTRAVENOUS at 19:17

## 2019-07-19 RX ADMIN — SODIUM CHLORIDE 1000 ML: 900 INJECTION, SOLUTION INTRAVENOUS at 19:06

## 2019-07-19 RX ADMIN — SODIUM CHLORIDE 5 MG/HR: 900 INJECTION, SOLUTION INTRAVENOUS at 19:13

## 2019-07-19 RX ADMIN — SODIUM CHLORIDE 500 MG: 900 INJECTION, SOLUTION INTRAVENOUS at 20:06

## 2019-07-19 RX ADMIN — IOPAMIDOL 100 ML: 755 INJECTION, SOLUTION INTRAVENOUS at 16:33

## 2019-07-19 NOTE — ED NOTES
Pt transported to [facility] via critical care transport. VSS. ANT ppwk complete. Verbal report given to Meliza Randolph RN in the ICU.

## 2019-07-19 NOTE — CONSULTS
Spoke to ER MD for stat consult. Ct c/w aneurysmal sah. [facility]   BERRY consult

## 2019-07-19 NOTE — ED NOTES
Signs and symptoms: 5 falls in last 2 days with 2 falls today; right sided weakness and right sided upper arm drift   VAN score: Negative  Last Known Well: 7/19/19 0000   Blood Glucose (EMS acceptable): 269  Blood Pressure (EMS acceptable): 125/93  Anticoagulants: unknown- hx of a-fib      Pt with no drift noted at triage. Pt hit head during one fall, also with pain to right shoulder, and skin tear to left forearm. Denies any LOC with falls. Pt also with hx of DM and recent diagnosis of UTI but has not started taking medications.

## 2019-07-19 NOTE — ED PROVIDER NOTES
80 y.o. female with past medical history significant for HTN, diabetes, ARF, and a-fib who presents from home via EMS with chief complaint of weakness. Patient had a GLF ~3.5 hours ago, due to weakness she has been experiencing for ~2 days. Patient experienced a second fall ~2 hours ago, hitting her head, with LOC. En route to ED EMS called code stroke due to unilateral weakness on patient's right side, positive pronator drift, and positive unequal  strength. Patient reports after GLF she experienced a headache however that has since subsided. Patient presents to [facility] ED with resolved headache and right sided weakness. Patient denies use of anticoagulants. Patient denies cough, fever, and abdominal pain. There are no other acute medical concerns at this time. Social hx: No Tobacco use, No EtOH use, No illicit drug use. PCP: Laura Davis MD    Note written by Kanchan Hill, as dictated by Carole Byers MD 4:00 PM     The history is provided by the patient and the EMS personnel. No  was used.         Past Medical History:   Diagnosis Date    [facility]F (acute renal failure) ([facility] [address]     2/11/13 - had a viral gastroenteritis     Diabetes ([facility] [address]     Duodenal ulcer disease     EGD 2/13 - aspirin stopped afterwards    Hypertension     Leg edema     PAF (paroxysmal atrial fibrillation) ([facility])     1 hour epsiode 2/11/13       Past Surgical History:   Procedure Laterality Date    HX OTHER SURGICAL      Echo 2/11/13 - mild LVH, EF 60%         Family History:   Problem Relation Age of Onset    Other Other         patient does not know    Coronary Artery Disease Father        Social History     Socioeconomic History    Marital status:      Spouse name: Not on file    Number of children: Not on file    Years of education: Not on file    Highest education level: Not on file   Occupational History    Not on file   Social Needs    Financial resource strain: Not on file   [place] Food insecurity:     Worry: Not on file     Inability: Not on file    Transportation needs:     Medical: Not on file     Non-medical: Not on file   Tobacco Use    Smoking status: Never Smoker   Substance and Sexual Activity    Alcohol use: No    Drug use: No    Sexual activity: Not on file   Lifestyle    Physical activity:     Days per week: Not on file     Minutes per session: Not on file    Stress: Not on file   Relationships    Social connections:     Talks on phone: Not on file     Gets together: Not on file     Attends Jehovah's witness service: Not on file     Active member of club or organization: Not on file     Attends meetings of clubs or organizations: Not on file     Relationship status: Not on file    Intimate partner violence:     Fear of current or ex partner: Not on file     Emotionally abused: Not on file     Physically abused: Not on file     Forced sexual activity: Not on file   Other Topics Concern    Not on file   Social History Narrative    Not on file         ALLERGIES: Patient has no known allergies. Review of Systems   Constitutional: Negative for chills and fever. Respiratory: Negative for cough and shortness of breath. Cardiovascular: Negative for chest pain. Gastrointestinal: Negative for abdominal pain, constipation, diarrhea and vomiting. Neurological: Positive for weakness and headaches. Negative for dizziness and light-headedness. All other systems reviewed and are negative. There were no vitals filed for this visit. Physical Exam   Constitutional: She is oriented to person, place, and time. She appears well-developed and well-nourished. HENT:   Head: Normocephalic. Eyes: Pupils are equal, round, and reactive to light. Neck: Normal range of motion. Neck supple. Cardiovascular: Normal rate and regular rhythm. Pulmonary/Chest: Effort normal and breath sounds normal.   Abdominal: Soft. She exhibits no distension. There is no tenderness. Neurological: She is alert and oriented to person, place, and time. She has normal strength. No cranial nerve deficit or sensory deficit. No pronator drift. Equal strength and sensation in all 4 extremities. No slurred speech, no aphasia. Skin: Skin is warm and dry. Capillary refill takes less than 2 seconds. Psychiatric: She has a normal mood and affect. Her behavior is normal.   Nursing note and vitals reviewed. Note written by Kanchan Bustamante, as dictated by Jennifer Estrella MD 4:00 PM    MDM  Number of Diagnoses or Management Options  Diagnosis management comments: DDX arrhythmia, STEMI, seizure, meningitis, stroke, sepsis, subarachnoid hemorrhage, intracranial bleeding, encephalitis. CT shows SAH, CTA negative for aneurysm. Pt not on blood thinners and neurological exam has returned to baseline. Patient is being admitted to the hospital.  The results of their tests and reasons for their admission have been discussed with them and/or available family. They convey agreement and understanding for the need to be admitted and for their admission diagnosis. Consultation will be made now with the inpatient physician for hospitalization. Khadra Martines MD has spent 45 minutes of critical care time involved in lab review, consultations with specialist, family decision-making, and documentation. During this entire length of time I was immediately available to the patient. Critical Care: The reason for providing this level of medical care for this critically ill patient was due a critical illness that impaired one or more vital organ systems such that there was a high probability of imminent or life threatening deterioration in the patients condition.  This care involved high complexity decision making to assess, manipulate, and support vital system functions, to treat this degreee vital organ system failure and to prevent further life threatening deterioration of the patients condition. Procedures  ED EKG interpretation:  Rhythm: atrial fib; and irregular. Rate (approx.): 116 bpm; Axis: normal; ST/T wave: no ST/T wave changes. Note written by Kanchan Hill, as dictated by Carole Byers MD 4:00 PM    PROGRESS NOTE:  4:25 PM  Dr. Blayne Keane, Hospitalist, has accepted pt. Still waiting to hear back from Neuro interventional.     4:33 PM  Provider spoke to Dr. Maddy Rust from neuro interventional, Dr. Maddy Rust will evaluate pt's imaging and call back with suggestions for placement. 5:10 PM  Provider spoke to Dr. Maddy Rust who agrees with plan of care for pt.

## 2019-07-19 NOTE — PROGRESS NOTES
Famotidine decreased from 20 mg bid to 20 mg daily per protocol for creatinine clearance <50 ml/min. Pharmacy to monitor daily and adjust if necessary for any changes in renal function.

## 2019-07-19 NOTE — CONSULTS
Neurointerventional Surgery Chart & Imaging Review Note    I was called by Dr. Pablo Celaya to review the imaging for patient Joe Louis for possible neurointervention. I reviewed the chart and imaging studies. The noncontrast head CT shows acute subarachnoid hemorrhage in the basilar cisterns, in the Sylvian fissures, left greater than right, and along the tentorium, also left greater than right. CTA shows no definite evidence of intracranial cerebral aneurysm. There is severe stenosis with near-complete occlusion of the left ICA origin with secondary diminution of the left ICA. Left PCOM likely supplies most of flow to left MCA territory (tiny left A1 segment). Right anterior temporal branch arises from distal right ICA. Small infundibulum at right PCOM origin. Left vertebral artery dominant. Right vertebral artery terminates in the right PICA. 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 Patient already in transit to [facility] ICU. Per Dr. Pablo Celaya, patient is completely asymptomatic ([place] [id number]) with SBP<140.     Plan:  - Admit to ICU; Q1 hr neurochecks  - SBP <140; Cardene prn  - 1L NS IV fluid bolus  - NS @ 125 cc/hr  - Nimodipine 60 mg Q4 hrs  - Diagnostic cerebral angiogram

## 2019-07-19 NOTE — PROGRESS NOTES
Spiritual Care Assessment/Progress Note  [address]      NAME: Kaylin Irwin      MRN: [id number]  AGE: 80 y.o. SEX: female  Worship Affiliation: No Zoroastrianism   Language: English     7/19/2019     Total Time (in minutes): 5     Spiritual Assessment begun in [facility] EMERGENCY DEPT through conversation with:         []Patient        [] Family    [] Friend(s)        Reason for Consult: Other (comment)(Code Stroke)     Spiritual beliefs: (Please include comment if needed)     [] Identifies with a sunil tradition:         [] Supported by a sunil community:            [] Claims no spiritual orientation:           [] Seeking spiritual identity:                [] Adheres to an individual form of spirituality:           [x] Not able to assess:                           Identified resources for coping:      [] Prayer                               [] Music                  [] Guided Imagery     [] Family/friends                 [] Pet visits     [] Devotional reading                         [x] Unknown     [] Other:                                               Interventions offered during this visit: (See comments for more details)                Plan of Care:     [] Support spiritual and/or cultural needs    [] Support AMD and/or advance care planning process      [] Support grieving process   [] Coordinate Rites and/or Rituals    [] Coordination with community clergy   [] No spiritual needs identified at this time   [] Detailed Plan of Care below (See Comments)  [] Make referral to Music Therapy  [] Make referral to Pet Therapy     [] Make referral to Addiction services  [] Make referral to [facility]  [] Make referral to Spiritual Care Partner  [] No future visits requested        [x] Follow up visits as needed     Comments:  responded to Code Stroke Emergency Room (ER) room 6. Staff with patient providing care. Patient taken for CT scan. No family present at this time.   Will continue to follow up as needed and upon request as able. Visited by .  Anastasia Poe MDiv, [facility], [facility] paging service: [phone])

## 2019-07-19 NOTE — ED TRIAGE NOTES
Patient immediately taken to CT upon arrival to ED. Per  12:30 PM wife was on the couch resting after feeling generally weak for the last 2 days. He assisted her up to the bathroom and made it 15 feet before she collapsed  \"her whole body went limp\" did not lose consciousness that time, got her up and into bed. 2nd time got her up around 2 PM and to the toilet and then she collapsed and almost hit the tub. Patient does not remember that and  reports that she had a brief LOC, assisted her back to bed and collapsed another time and he called 911. Patient reports that she has had headaches relieved with some tylenol over past couple days.  reports that when EMS evaluated her she had weakness in the right arm.

## 2019-07-19 NOTE — PROGRESS NOTES
TRANSFER - IN REPORT:  Verbal report received from Ney Anglin(name) on Victorina Le  being received from [facility]) for routine progression of care    Report consisted of patients Situation, Background, Assessment and   Recommendations(SBAR). Information from the following report(s) SBAR, Kardex, ED Summary, Intake/Output, MAR, Accordion, Recent Results, Med Rec Status, Cardiac Rhythm nsr and Alarm Parameters  was reviewed with the receiving nurse. Opportunity for questions and clarification was provided. Assessment completed upon patients arrival to unit and care assumed. 1810: Dr Lorna Shipman re-paged for admission, technical issue with registration admitting patient to hospital, pt now admitted, care assumed  [phone]: Dr Lorna Shipman returned page, MD will be up to unit as soon as possible, apprised of pt in afib/aflutter with rate in 100s-130s with SBP in 170s as well as pt anxiety. Per Dr Moore note would like SBP <140, cardene orders entered  [phone]: Upon BP recheck , cardene held at this time  1842: Dr Lorna Shipman at bedside assessing patient, [facility] for pt to eat at this time if OK with neurosurgery, will be putting in admission orders, Dr Tiffanie Jauregui paged  1900: Return page from Dr Tiffanie Jauregui, [facility] with patient having regular diet order until midnight, NPO after midnight for cerebral angio in AM  1913: Pt with , cardene started per order    Bedside and Verbal shift change report given to Ronnell Huber (oncoming nurse) by Magdaleno Lefort (offgoing nurse). Report included the following information SBAR, Kardex, Intake/Output, MAR, Accordion, Recent Results, Med Rec Status, Cardiac Rhythm a fib and Alarm Parameters .

## 2019-07-20 ENCOUNTER — APPOINTMENT (OUTPATIENT)
Dept: INTERVENTIONAL RADIOLOGY/VASCULAR | Age: 69
DRG: 065 | End: 2019-07-20
Attending: RADIOLOGY
Payer: COMMERCIAL

## 2019-07-20 ENCOUNTER — APPOINTMENT (OUTPATIENT)
Dept: CT IMAGING | Age: 69
DRG: 065 | End: 2019-07-20
Attending: FAMILY MEDICINE
Payer: COMMERCIAL

## 2019-07-20 ENCOUNTER — APPOINTMENT (OUTPATIENT)
Dept: NON INVASIVE DIAGNOSTICS | Age: 69
DRG: 065 | End: 2019-07-20
Attending: NURSE PRACTITIONER
Payer: COMMERCIAL

## 2019-07-20 PROBLEM — I60.9 SAH (SUBARACHNOID HEMORRHAGE) (HCC): Status: ACTIVE | Noted: 2019-07-20

## 2019-07-20 LAB
ANION GAP SERPL CALC-SCNC: 8 MMOL/L (ref 5–15)
ATRIAL RATE: 468 BPM
BUN SERPL-MCNC: 38 MG/DL (ref 6–20)
BUN/CREAT SERPL: 29 (ref 12–20)
CALCIUM SERPL-MCNC: 8.9 MG/DL (ref 8.5–10.1)
CALCULATED R AXIS, ECG10: 25 DEGREES
CALCULATED T AXIS, ECG11: -117 DEGREES
CHLORIDE SERPL-SCNC: 105 MMOL/L (ref 97–108)
CO2 SERPL-SCNC: 28 MMOL/L (ref 21–32)
CREAT SERPL-MCNC: 1.33 MG/DL (ref 0.55–1.02)
DIAGNOSIS, 93000: NORMAL
ERYTHROCYTE [DISTWIDTH] IN BLOOD BY AUTOMATED COUNT: 13.6 % (ref 11.5–14.5)
GLUCOSE BLD STRIP.AUTO-MCNC: 142 MG/DL (ref 65–100)
GLUCOSE BLD STRIP.AUTO-MCNC: 153 MG/DL (ref 65–100)
GLUCOSE BLD STRIP.AUTO-MCNC: 180 MG/DL (ref 65–100)
GLUCOSE BLD STRIP.AUTO-MCNC: 195 MG/DL (ref 65–100)
GLUCOSE BLD STRIP.AUTO-MCNC: 252 MG/DL (ref 65–100)
GLUCOSE SERPL-MCNC: 169 MG/DL (ref 65–100)
HCT VFR BLD AUTO: 39.8 % (ref 35–47)
HGB BLD-MCNC: 12.7 G/DL (ref 11.5–16)
MCH RBC QN AUTO: 29.9 PG (ref 26–34)
MCHC RBC AUTO-ENTMCNC: 31.9 G/DL (ref 30–36.5)
MCV RBC AUTO: 93.6 FL (ref 80–99)
NRBC # BLD: 0 K/UL (ref 0–0.01)
NRBC BLD-RTO: 0 PER 100 WBC
PLATELET # BLD AUTO: 214 K/UL (ref 150–400)
PMV BLD AUTO: 10.6 FL (ref 8.9–12.9)
POTASSIUM SERPL-SCNC: 3.5 MMOL/L (ref 3.5–5.1)
Q-T INTERVAL, ECG07: 296 MS
QRS DURATION, ECG06: 84 MS
QTC CALCULATION (BEZET), ECG08: 396 MS
RBC # BLD AUTO: 4.25 M/UL (ref 3.8–5.2)
SERVICE CMNT-IMP: ABNORMAL
SODIUM SERPL-SCNC: 141 MMOL/L (ref 136–145)
VENTRICULAR RATE, ECG03: 108 BPM
WBC # BLD AUTO: 10 K/UL (ref 3.6–11)

## 2019-07-20 PROCEDURE — 74011250636 HC RX REV CODE- 250/636: Performed by: FAMILY MEDICINE

## 2019-07-20 PROCEDURE — 74011000250 HC RX REV CODE- 250: Performed by: FAMILY MEDICINE

## 2019-07-20 PROCEDURE — 70450 CT HEAD/BRAIN W/O DYE: CPT

## 2019-07-20 PROCEDURE — C1760 CLOSURE DEV, VASC: HCPCS

## 2019-07-20 PROCEDURE — 74011250636 HC RX REV CODE- 250/636

## 2019-07-20 PROCEDURE — 36224 PLACE CATH CAROTD ART: CPT

## 2019-07-20 PROCEDURE — 74011250637 HC RX REV CODE- 250/637: Performed by: NURSE PRACTITIONER

## 2019-07-20 PROCEDURE — 77030021532 HC CATH ANGI DX IMPRS MRTM -B

## 2019-07-20 PROCEDURE — 36415 COLL VENOUS BLD VENIPUNCTURE: CPT

## 2019-07-20 PROCEDURE — 74011250636 HC RX REV CODE- 250/636: Performed by: RADIOLOGY

## 2019-07-20 PROCEDURE — 93306 TTE W/DOPPLER COMPLETE: CPT

## 2019-07-20 PROCEDURE — 80048 BASIC METABOLIC PNL TOTAL CA: CPT

## 2019-07-20 PROCEDURE — C1769 GUIDE WIRE: HCPCS

## 2019-07-20 PROCEDURE — 65610000006 HC RM INTENSIVE CARE

## 2019-07-20 PROCEDURE — 74011636320 HC RX REV CODE- 636/320

## 2019-07-20 PROCEDURE — 85027 COMPLETE CBC AUTOMATED: CPT

## 2019-07-20 PROCEDURE — 74011000250 HC RX REV CODE- 250: Performed by: RADIOLOGY

## 2019-07-20 PROCEDURE — 82962 GLUCOSE BLOOD TEST: CPT

## 2019-07-20 PROCEDURE — C1894 INTRO/SHEATH, NON-LASER: HCPCS

## 2019-07-20 PROCEDURE — B3181ZZ FLUOROSCOPY OF BILATERAL INTERNAL CAROTID ARTERIES USING LOW OSMOLAR CONTRAST: ICD-10-PCS | Performed by: RADIOLOGY

## 2019-07-20 PROCEDURE — 74011636637 HC RX REV CODE- 636/637: Performed by: FAMILY MEDICINE

## 2019-07-20 PROCEDURE — B31G1ZZ FLUOROSCOPY OF BILATERAL VERTEBRAL ARTERIES USING LOW OSMOLAR CONTRAST: ICD-10-PCS | Performed by: RADIOLOGY

## 2019-07-20 PROCEDURE — 74011000258 HC RX REV CODE- 258: Performed by: FAMILY MEDICINE

## 2019-07-20 RX ORDER — SODIUM CHLORIDE 9 MG/ML
50 INJECTION, SOLUTION INTRAVENOUS CONTINUOUS
Status: DISCONTINUED | OUTPATIENT
Start: 2019-07-20 | End: 2019-07-20 | Stop reason: ALTCHOICE

## 2019-07-20 RX ORDER — VERAPAMIL HYDROCHLORIDE 2.5 MG/ML
10 INJECTION, SOLUTION INTRAVENOUS
Status: DISCONTINUED | OUTPATIENT
Start: 2019-07-20 | End: 2019-07-20 | Stop reason: ALTCHOICE

## 2019-07-20 RX ORDER — DOCUSATE SODIUM 100 MG/1
100 CAPSULE, LIQUID FILLED ORAL DAILY
Status: DISCONTINUED | OUTPATIENT
Start: 2019-07-20 | End: 2019-07-29 | Stop reason: HOSPADM

## 2019-07-20 RX ORDER — FENTANYL CITRATE 50 UG/ML
100 INJECTION, SOLUTION INTRAMUSCULAR; INTRAVENOUS
Status: DISCONTINUED | OUTPATIENT
Start: 2019-07-20 | End: 2019-07-20 | Stop reason: ALTCHOICE

## 2019-07-20 RX ORDER — MIDAZOLAM HYDROCHLORIDE 1 MG/ML
5 INJECTION, SOLUTION INTRAMUSCULAR; INTRAVENOUS
Status: DISCONTINUED | OUTPATIENT
Start: 2019-07-20 | End: 2019-07-20 | Stop reason: ALTCHOICE

## 2019-07-20 RX ORDER — VERAPAMIL HYDROCHLORIDE 2.5 MG/ML
INJECTION, SOLUTION INTRAVENOUS
Status: DISCONTINUED
Start: 2019-07-20 | End: 2019-07-20 | Stop reason: WASHOUT

## 2019-07-20 RX ORDER — LIDOCAINE HYDROCHLORIDE 10 MG/ML
INJECTION, SOLUTION EPIDURAL; INFILTRATION; INTRACAUDAL; PERINEURAL
Status: COMPLETED
Start: 2019-07-20 | End: 2019-07-20

## 2019-07-20 RX ADMIN — DOCUSATE SODIUM 100 MG: 100 CAPSULE, LIQUID FILLED ORAL at 13:05

## 2019-07-20 RX ADMIN — SODIUM CHLORIDE 5 MG/HR: 900 INJECTION, SOLUTION INTRAVENOUS at 21:21

## 2019-07-20 RX ADMIN — SODIUM CHLORIDE 500 MG: 900 INJECTION, SOLUTION INTRAVENOUS at 11:19

## 2019-07-20 RX ADMIN — IOPAMIDOL 160 ML: 612 INJECTION, SOLUTION INTRAVENOUS at 09:57

## 2019-07-20 RX ADMIN — NIMODIPINE 60 MG: 30 CAPSULE, LIQUID FILLED ORAL at 11:18

## 2019-07-20 RX ADMIN — SODIUM CHLORIDE 10 MG/HR: 900 INJECTION, SOLUTION INTRAVENOUS at 16:01

## 2019-07-20 RX ADMIN — NIMODIPINE 60 MG: 30 CAPSULE, LIQUID FILLED ORAL at 16:04

## 2019-07-20 RX ADMIN — NIMODIPINE 60 MG: 30 CAPSULE, LIQUID FILLED ORAL at 00:28

## 2019-07-20 RX ADMIN — HEPARIN SODIUM 4000 UNITS/HR: 5000 INJECTION, SOLUTION INTRAVENOUS; SUBCUTANEOUS at 09:22

## 2019-07-20 RX ADMIN — LIDOCAINE HYDROCHLORIDE 10 ML: 10 INJECTION, SOLUTION EPIDURAL; INFILTRATION; INTRACAUDAL; PERINEURAL at 09:28

## 2019-07-20 RX ADMIN — INSULIN LISPRO 2 UNITS: 100 INJECTION, SOLUTION INTRAVENOUS; SUBCUTANEOUS at 17:57

## 2019-07-20 RX ADMIN — FENTANYL CITRATE 50 MCG: 50 INJECTION, SOLUTION INTRAMUSCULAR; INTRAVENOUS at 10:13

## 2019-07-20 RX ADMIN — HEPARIN SODIUM 4000 UNITS/HR: 5000 INJECTION, SOLUTION INTRAVENOUS; SUBCUTANEOUS at 09:21

## 2019-07-20 RX ADMIN — MIDAZOLAM HYDROCHLORIDE 1 MG: 1 INJECTION, SOLUTION INTRAMUSCULAR; INTRAVENOUS at 09:02

## 2019-07-20 RX ADMIN — MIDAZOLAM HYDROCHLORIDE 1 MG: 1 INJECTION, SOLUTION INTRAMUSCULAR; INTRAVENOUS at 10:12

## 2019-07-20 RX ADMIN — FENTANYL CITRATE 50 MCG: 50 INJECTION, SOLUTION INTRAMUSCULAR; INTRAVENOUS at 09:01

## 2019-07-20 RX ADMIN — INSULIN LISPRO 2 UNITS: 100 INJECTION, SOLUTION INTRAVENOUS; SUBCUTANEOUS at 11:18

## 2019-07-20 RX ADMIN — NIMODIPINE 60 MG: 30 CAPSULE, LIQUID FILLED ORAL at 04:35

## 2019-07-20 RX ADMIN — HEPARIN SODIUM 4000 UNITS/HR: 5000 INJECTION, SOLUTION INTRAVENOUS; SUBCUTANEOUS at 09:20

## 2019-07-20 RX ADMIN — SODIUM CHLORIDE 125 ML/HR: 900 INJECTION, SOLUTION INTRAVENOUS at 03:16

## 2019-07-20 RX ADMIN — INSULIN LISPRO 2 UNITS: 100 INJECTION, SOLUTION INTRAVENOUS; SUBCUTANEOUS at 07:08

## 2019-07-20 RX ADMIN — SODIUM CHLORIDE 500 MG: 900 INJECTION, SOLUTION INTRAVENOUS at 20:17

## 2019-07-20 RX ADMIN — SODIUM CHLORIDE 10 MG/HR: 900 INJECTION, SOLUTION INTRAVENOUS at 18:54

## 2019-07-20 RX ADMIN — NIMODIPINE 60 MG: 30 CAPSULE, LIQUID FILLED ORAL at 20:17

## 2019-07-20 RX ADMIN — FAMOTIDINE 20 MG: 10 INJECTION INTRAVENOUS at 20:17

## 2019-07-20 NOTE — PROGRESS NOTES
Spoke to nursing. Patient currently at angio and will be on bed rest for a period upon return. Will defer evaluation and follow up later.

## 2019-07-20 NOTE — H&P (VIEW-ONLY)
Neurointerventional [address] [place], NP  Neurocritical Care Nurse Practitioner  [phone]    Patient: Neil Auguste MRN: [id number]  SSN: [id number]    YOB: 1950  Age: 80 y.o. Sex: female      Chief Complaint: Headache and weakness    Subjective:      Neil Auguste is a 80 y.o. female who presented to [facility] ED due to complaints of headache and weakness. Pt reports on 19 she was vomiting, reports the headache started after the vomiting the next day. She has tried taking tylenol for her headache with some relief. She reports a hx of minor headache, but that this headache was worse than usual for her. Pt reports weakness x 2 days, reports today the weakness was worse on her way to the bathroom causing a GLF. Pt denies LOC. Pt is a type I diabetic, reports she checked her BG and was 199. She reports her BG has been elevated over the past 5 days, more so than usual. In the ED, a stat head CT was performed which showed SAH. 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 She was transferred to [facility] for higher level of care. Presently, she reports dull headache, rates 4/10. She denies any dizziness, numbness, tingling, reports she is nauseated but believes this is due to being hungry, reports minimal neck pain but denies neck stiffness. She is not a smoker and reports a family hx of aneurysm, reports her uncle  from cerebral aneurysm.      Past Medical History:   Diagnosis Date    ARF (acute renal failure) ([address]     13 - had a viral gastroenteritis     Diabetes ([facility]     Duodenal ulcer disease     EGD  - aspirin stopped afterwards    Hypertension     Leg edema     PAF (paroxysmal atrial fibrillation) ([facility])     1 hour epsiode 13     Family History   Problem Relation Age of Onset    Other Other         patient does not know    Coronary Artery Disease Father      Social History     Tobacco Use    Smoking status: Never Smoker    Smokeless tobacco: Never Used   Substance Use Topics    Alcohol use: No      Prior to Admission Medications   Prescriptions Last Dose Informant Patient Reported? Taking? Calcium-Cholecalciferol, D3, (CALCIUM 600 WITH VITAMIN D3) 600 mg(1,500mg) -400 unit cap   Yes No   Sig: Take  by mouth. OTHER   Yes No   Sig: mycardis hct   atorvastatin (LIPITOR) 40 mg tablet   Yes No   Sig: Take 40 mg by mouth daily. ciprofloxacin HCl (CIPRO) 250 mg tablet   Yes No   Sig: Take 250 mg by mouth two (2) times a day. fexofenadine (ALLEGRA) 180 mg tablet   Yes No   Sig: Take  by mouth daily. insulin NPH (NOVOLIN, HUMULIN) 100 unit/mL injection   Yes No   Sig: by SubCUTAneous route once. insulin lispro (HUMALOG) 100 unit/mL injection   Yes No   Sig: by SubCUTAneous route. metoprolol (LOPRESSOR) 25 mg tablet   No No   Sig: TAKE 1/2 TABLET BY MOUTH TWO (2) TIMES A DAY. olmesartan-hydroCHLOROthiazide (BENICAR HCT) 40-25 mg per tablet   Yes No   Sig: Take 1 Tab by mouth daily. omega-3 fatty acids-vitamin e (FISH OIL) 1,000 mg cap   Yes No   Sig: Take 1 Cap by mouth. ondansetron (ZOFRAN ODT) 4 mg disintegrating tablet   No No   Sig: Take 1 Tab by mouth every eight (8) hours as needed for Nausea. pantoprazole (PROTONIX) 40 mg tablet   No No   Sig: Take 1 Tab by mouth daily. rosuvastatin (CRESTOR) 40 mg tablet   Yes No   Sig: Take 40 mg by mouth nightly. telmisartan-hydrochlorothiazide (MICARDIS HCT) 80-25 mg per tablet   No No   Sig: Take 1 Tab by mouth daily. Facility-Administered Medications: None       No Known Allergies    Review of Systems:  Pertinent items are noted in the History of Present Illness. Objective:     Vitals:    07/19/19 1810   BP: 140/86   Pulse: (!) 123   Resp: 18   Temp: 98.7 °F (37.1 °C)   SpO2: 97%      Physical Exam:  GENERAL: Calm, cooperative, NAD  SKIN: Warm, dry, color appropriate for ethnicity.      Neurologic Exam:  Mental Status:  Alert and oriented x 4. Appropriate affect, mood and behavior. Language:    Normal fluency, repetition, comprehension and naming. Cranial Nerves:   Pupils 3 mm, equal, round and reactive to light. Visual fields full to confrontation. Extraocular movements intact. Facial sensation intact. Full facial strength, no asymmetry. Hearing grossly intact bilaterally. No dysarthria. Tongue protrudes to midline, palate elevates symmetrically. Shoulder shrug 5/5 bilaterally. Motor:    No pronator drift. Bulk and tone normal.      5/5 power in all extremities proximally and distally. No involuntary movements. Sensation:    Sensation intact throughout to light touch. Coordination & Gait: Gait deferred. FTN and HTS intact with no ataxia present. Labs:  Lab Results   Component Value Date/Time    WBC 11.7 (H) 07/19/2019 04:01 PM    HGB 15.1 07/19/2019 04:01 PM    HCT 46.0 07/19/2019 04:01 PM    PLATELET 071 87/56/7264 04:01 PM    MCV 92.0 07/19/2019 04:01 PM     Lab Results   Component Value Date/Time    Sodium 137 07/19/2019 04:01 PM    Potassium 3.5 07/19/2019 04:01 PM    Chloride 99 07/19/2019 04:01 PM    CO2 31 07/19/2019 04:01 PM    Anion gap 7 07/19/2019 04:01 PM    Glucose 231 (H) 07/19/2019 04:01 PM    BUN 50 (H) 07/19/2019 04:01 PM    Creatinine 1.74 (H) 07/19/2019 04:01 PM    BUN/Creatinine ratio 29 (H) 07/19/2019 04:01 PM    GFR est AA 35 (L) 07/19/2019 04:01 PM    GFR est non-AA 29 (L) 07/19/2019 04:01 PM    Calcium 9.7 07/19/2019 04:01 PM     Imaging (Independently reviewed by Dr. Zeke Galeana and myself):  CT head on 7/19/19 showed acute subarachnoid hemorrhage in the left sylvian fissure and basal cisterns. No evidence for acute infarct. CTA head and neck on 7/19/19 showed severe atherosclerotic change in the cervical internal carotid artery. Greater than 90% stenosis of the proximal left internal carotid artery.  Moderate stenosis right internal carotid artery approximately 50%. No intracranial aneurysm. Minimal irregularity of M2 segments may be related to mild vasospasm. Stable degree of subarachnoid hemorrhage when compared to the CT of the head performed earlier in the day. Assessment:     Hospital Problems  Date Reviewed: 3/19/2013          Codes Class Noted POA    ICH (intracerebral hemorrhage) ([facility] [address] ICD-10-CM: I61.9  ICD-9-CM: 228  7/19/2019 Unknown            Plan:     SAH, possibly due to ruptured cerebral aneurysm, Savage Mcdonald 1, Perez Grade 2   - Plans for diagnostic cerebral angiogram in AM. NPO after midnight please. - Day 1/21 of Nimotop   - Keppra 500 BID for seizure ppx   - NS at 125 ml/hr to maintain euvolemia   - Strict I&O   - ECHO pending   - q 1 neuro checks   - SBP goal less than 140,  Cardene PRN   - PT/OT/SLP evals as able    DMI   -SSI and accuchecks per hospitalist    I have discussed the diagnosis and the intended plan as seen in the above orders with Dr. Zeke Galeana, the patient and the primary RN. Patient/family at bedside, updated on current plan of care and are in agreement. All questions were answered. Thank you for this consult and participating in the care of this patient. Signed By: Charanjit Crowder NP     July 19, 2019        I saw and evaluated the patient and reviewed all of her imaging. I discussed the findings, assessment and plan with the resident and agree with the resident's findings and plan as documented in the resident's note. Blood pattern is primarily perimesencephalic with some SAH in the sylvian fissures, left greater than right. The ventricles are normal in size. The CTA shows no obvious aneurysm, AVM, or other bleeding source. Will perform an angiogram in the am and evaluate for a bleeding source and vasospasm. Routine post SAH orders in place. Rosendo Smith M.D.   Attending, N[facility]

## 2019-07-20 NOTE — INTERVAL H&P NOTE
H&P Update:  Ryan Yi was seen and examined. History and physical has been reviewed. The patient has been examined.  There have been no significant clinical changes since the completion of the originally dated History and Physical.

## 2019-07-20 NOTE — PROGRESS NOTES
Physical Therapy  Order received, chart reviewed, evaluation held. RN states pt must remain flat x2 hrs. Will f/u as able and appropriate for services.   Thank you,  Jacques Swan, PT, DPT

## 2019-07-20 NOTE — PROGRESS NOTES
Neurocritical Care Progress Note  Clarisse Little, [facility]  Neurocritical Care NP  [phone]    Admit Date: 2019   LOS: 1 day        Daily Progress Note: 2019    HPI: Yinka Carter is a 80 y.o. female with a PMH significant for HTN, Type 1 DM, hx of acute renal failure, and atrial fibrillation who presented to [address] [place] [address] ED on  due to complaints of headache and weakness. Patient reported on 19 she started vomiting and then subsequently developed a headache the next day. She tried taking tylenol for her headache with some relief. She reported a hx of a minor headache, but this headache was worse than usual for her. The patient also reported weakness x 2 days causing a few falls at home. She denied any LOC. She reported her blood glucose has been elevated over the past 5 days, more so than usual. In the ED, a stat head CT was performed which showed SAH in the left sylvian fissure and basal cisterns. 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 She was transferred to [facility] for higher level of care. She is not a smoker and reports her uncle  from a cerebral aneurysm. Subjective:   Patient is resting in bed. Alert and oriented x 4. She is scheduled for a cerebral angiogram this morning to further evaluate for presence of a cerebral aneurysm. She currently denies any headache, neck stiffness, photophobia, vision changes, dizziness, nausea, vomiting, abdominal pain, chest pain, SOB, numbness, tingling, weakness, or speech difficulty. Patient denies any issues with urinating, constipation, or diarrhea. However, she reports her last bowel movement was this past Monday and reported it was was loose. However, she has not had a good appetite.      Current Facility-Administered Medications   Medication Dose Route Frequency Provider Last Rate Last Dose    heparin 4000 units/1000 mL (4 units/mL) in NS infusion **FOR ANGIO USE ONLY** 16,000 Units/hr IntraSHEAth RAD PRN Ghulam Connors MD        heparin 4000 units/1000 mL (4 units/mL) in NS 4000 units/1000ml NS infusion **FOR ANGIO USE ONLY**             0.9% sodium chloride infusion  50 mL/hr IntraVENous CONTINUOUS Josep Bai MD        fentaNYL citrate (PF) injection 100 mcg  100 mcg IntraVENous RAD PRN Josep Bai MD        midazolam (VERSED) injection 5 mg  5 mg IntraVENous RAD PRN Josep Bai MD        lidocaine (PF) (XYLOCAINE) 10 mg/mL (1 %) injection             iopamidol (ISOVUE 300) 61 % contrast injection             niCARdipine (CARDENE) 25 mg in 0.9% sodium chloride 250 mL infusion  0-15 mg/hr IntraVENous TITRATE Edwardo Davis MD 20 mL/hr at 07/19/19 2330 2 mg/hr at 07/19/19 2330    0.9% sodium chloride infusion  125 mL/hr IntraVENous CONTINUOUS Edwardo Davis  mL/hr at 07/20/19 0316 125 mL/hr at 07/20/19 0316    ondansetron (ZOFRAN) injection 4 mg  4 mg IntraVENous Q6H PRN Ghulam Connors MD        naloxone [facility]) injection 0.4 mg  0.4 mg IntraVENous PRN Ghulam Connors MD        acetaminophen (TYLENOL) tablet 650 mg  650 mg Oral Q4H PRN Ghulam Connors MD        Or   Tarri Jaida acetaminophen (TYLENOL) solution 650 mg  650 mg Per NG tube Q4H PRN Ghulam Connors MD        Or   Tarri Jaida acetaminophen (TYLENOL) suppository 650 mg  650 mg Rectal Q4H PRN Ghulam Connors MD        levETIRAcetam (KEPPRA) 500 mg in 0.9% sodium chloride 100 mL IVPB  500 mg IntraVENous Q12H Ghulam Connors  mL/hr at 07/19/19 2006 500 mg at 07/19/19 2006    famotidine (PF) (PEPCID) 20 mg in sodium chloride 0.9% 10 mL injection  20 mg IntraVENous Q24H Ghulam Connors MD   20 mg at 07/19/19 2102    glucose chewable tablet 16 g  4 Tab Oral PRN Ghulam Connors MD        dextrose (D50W) injection syrg 12.5-25 g  25-50 mL IntraVENous PRN Ghulam Connors MD        glucagon (GLUCAGEN) injection 1 mg  1 mg IntraMUSCular PRN Ghulam Connors MD        insulin lispro (HUMALOG) injection SubCUTAneous Q6H Danna Lieberman MD   2 Units at 19 0708    niMODipine (NIMOTOP) capsule 60 mg  60 mg Oral Q4H Jona Arroyo NP   60 mg at 19 0435        No Known Allergies    Review of Systems:  Pertinent items are noted in the History of Present Illness. Objective:     Vital signs  Temp (24hrs), Av.4 °F (36.9 °C), Min:98.1 °F (36.7 °C), Max:98.7 °F (37.1 °C)   No intake/output data recorded.  1901 - 700  In: 2127.1 [I.V.:2127.1]  Out: 400 [Urine:400]    Visit Vitals  /61   Pulse 65   Temp 98.5 °F (36.9 °C)   Resp 14   Wt 199 lb 4.7 oz (90.4 kg)   SpO2 94%   BMI 34.21 kg/m²      O2 Device: Room air     Pain control  Pain Assessment  Pain Scale 1: Numeric (0 - 10)  Pain Intensity 1: 0    PT/OT  Gait              Vitals:    19 0600 19 0615 19 0645 19 0700   BP: 109/53 124/54 126/55 133/61   Pulse: 66 63 68 65   Resp: 15 15 13 14   Temp:       SpO2: 97% 94% 94% 94%   Weight:            Physical Exam:  GENERAL: alert, cooperative, no distress, appears stated age  LUNG: clear to auscultation bilaterally  HEART: regular rate and rhythm, S1, S2 normal, no murmur, click, rub or gallop  EXTREMITIES:  extremities normal, atraumatic, no cyanosis or edema. 2+ distal pulses bilaterally. SKIN: no rash or abnormalities. Skin warm to touch     Neurologic Exam:  Mental Status:  Alert and oriented x 4. Appropriate affect, mood and behavior. Language:    Normal fluency, repetition, comprehension and naming. Cranial Nerves:        Pupils equal, round and reactive to light. Visual fields full to confrontation. Extraocular movements intact. Facial sensation intact. Full facial strength, no asymmetry. Hearing grossly intact bilaterally. No dysarthria. Tongue protrudes to midline, palate elevates symmetrically. Shoulder shrug 5/5 bilaterally. Motor:    No pronator drift.       Bulk and tone normal.      5/5 power in all extremities proximally and distally. No involuntary movements. Sensation:    Sensation intact throughout to light touch and pinprick    Coordination & Gait: No ataxia with FTN and HTS testing. Gait deferred. 24 hour results:    Recent Results (from the past 24 hour(s))   CBC WITH AUTOMATED DIFF    Collection Time: 07/19/19  4:01 PM   Result Value Ref Range    WBC 11.7 (H) 3.6 - 11.0 K/uL    RBC 5.00 3.80 - 5.20 M/uL    HGB 15.1 11.5 - 16.0 g/dL    HCT 46.0 35.0 - 47.0 %    MCV 92.0 80.0 - 99.0 FL    MCH 30.2 26.0 - 34.0 PG    MCHC 32.8 30.0 - 36.5 g/dL    RDW 13.5 11.5 - 14.5 %    PLATELET 009 275 - 062 K/uL    MPV 10.1 8.9 - 12.9 FL    NRBC 0.0 0  WBC    ABSOLUTE NRBC 0.00 0.00 - 0.01 K/uL    NEUTROPHILS 77 (H) 32 - 75 %    LYMPHOCYTES 15 12 - 49 %    MONOCYTES 7 5 - 13 %    EOSINOPHILS 0 0 - 7 %    BASOPHILS 0 0 - 1 %    IMMATURE GRANULOCYTES 1 (H) 0.0 - 0.5 %    ABS. NEUTROPHILS 8.9 (H) 1.8 - 8.0 K/UL    ABS. LYMPHOCYTES 1.8 0.8 - 3.5 K/UL    ABS. MONOCYTES 0.9 0.0 - 1.0 K/UL    ABS. EOSINOPHILS 0.0 0.0 - 0.4 K/UL    ABS. BASOPHILS 0.0 0.0 - 0.1 K/UL    ABS. IMM. GRANS. 0.1 (H) 0.00 - 0.04 K/UL    DF AUTOMATED     METABOLIC PANEL, BASIC    Collection Time: 07/19/19  4:01 PM   Result Value Ref Range    Sodium 137 136 - 145 mmol/L    Potassium 3.5 3.5 - 5.1 mmol/L    Chloride 99 97 - 108 mmol/L    CO2 31 21 - 32 mmol/L    Anion gap 7 5 - 15 mmol/L    Glucose 231 (H) 65 - 100 mg/dL    BUN 50 (H) 6 - 20 MG/DL    Creatinine 1.74 (H) 0.55 - 1.02 MG/DL    BUN/Creatinine ratio 29 (H) 12 - 20      GFR est AA 35 (L) >60 ml/min/1.73m2    GFR est non-AA 29 (L) >60 ml/min/1.73m2    Calcium 9.7 8.5 - 10.1 MG/DL   SAMPLES BEING HELD    Collection Time: 07/19/19  4:01 PM   Result Value Ref Range    SAMPLES BEING HELD 1DRK GRN,1RED,1SST,1BL     COMMENT        Add-on orders for these samples will be processed based on acceptable specimen integrity and analyte stability, which may vary by analyte.    GLUCOSE, POC Collection Time: 07/19/19  7:24 PM   Result Value Ref Range    Glucose (POC) 168 (H) 65 - 100 mg/dL    Performed by Mini Norman    EKG, 12 LEAD, INITIAL    Collection Time: 07/19/19  8:10 PM   Result Value Ref Range    Ventricular Rate 108 BPM    Atrial Rate 468 BPM    QRS Duration 84 ms    Q-T Interval 296 ms    QTC Calculation (Bezet) 396 ms    Calculated R Axis 25 degrees    Calculated T Axis -117 degrees    Diagnosis       Atrial fibrillation with rapid ventricular response  Nonspecific ST and T wave abnormality , probably digitalis effect  When compared with ECG of 11-FEB-2013 09:44,  Atrial fibrillation has replaced Sinus rhythm    Confirmed by Kym Darnell M.D., [facility] ([id number]) on 7/20/2019 7:03:14 AM     GLUCOSE, POC    Collection Time: 07/20/19 12:32 AM   Result Value Ref Range    Glucose (POC) 142 (H) 65 - 100 mg/dL    Performed by MARY ANN KHOURY    CBC W/O DIFF    Collection Time: 07/20/19  3:59 AM   Result Value Ref Range    WBC 10.0 3.6 - 11.0 K/uL    RBC 4.25 3.80 - 5.20 M/uL    HGB 12.7 11.5 - 16.0 g/dL    HCT 39.8 35.0 - 47.0 %    MCV 93.6 80.0 - 99.0 FL    MCH 29.9 26.0 - 34.0 PG    MCHC 31.9 30.0 - 36.5 g/dL    RDW 13.6 11.5 - 14.5 %    PLATELET 972 136 - 933 K/uL    MPV 10.6 8.9 - 12.9 FL    NRBC 0.0 0  WBC    ABSOLUTE NRBC 0.00 0.00 - 0.27 K/uL   METABOLIC PANEL, BASIC    Collection Time: 07/20/19  3:59 AM   Result Value Ref Range    Sodium 141 136 - 145 mmol/L    Potassium 3.5 3.5 - 5.1 mmol/L    Chloride 105 97 - 108 mmol/L    CO2 28 21 - 32 mmol/L    Anion gap 8 5 - 15 mmol/L    Glucose 169 (H) 65 - 100 mg/dL    BUN 38 (H) 6 - 20 MG/DL    Creatinine 1.33 (H) 0.55 - 1.02 MG/DL    BUN/Creatinine ratio 29 (H) 12 - 20      GFR est AA 48 (L) >60 ml/min/1.73m2    GFR est non-AA 40 (L) >60 ml/min/1.73m2    Calcium 8.9 8.5 - 10.1 MG/DL   GLUCOSE, POC    Collection Time: 07/20/19  7:07 AM   Result Value Ref Range    Glucose (POC) 180 (H) 65 - 100 mg/dL    Performed by Gena Landaverde Imaging:  CT of Head on 7/19/2019 shows  IMPRESSION:   Acute subarachnoid hemorrhage in the left sylvian fissure and basal cisterns. No  evidence for acute infarct. CTA of the Head and Neck on 7/19/2019 shows  IMPRESSION:  Severe atherosclerotic change in the cervical internal carotid artery. Greater  than 90% stenosis of the proximal left internal carotid artery.     Moderate stenosis right internal carotid artery approximately 50%.     No intracranial aneurysm. Minimal irregularity of M2 segments may be related to  mild vasospasm. Stable degree of subarachnoid hemorrhage when compared to the CT  of the head performed earlier in the day. CT of Head on 7/20/2019 shows  IMPRESSION:  1. Stable small to moderate volume subarachnoid hemorrhage as described above,  largest components in the suprasellar cistern, prepontine cistern, and left  sylvian fissure. Assessment:     Active Problems:    SAH (subarachnoid hemorrhage) ([facility] [address] (7/20/2019)        Plan:   1. SAH, possibly due to ruptured cerebral aneurysm, Savage Mcdonald 1, Perez Grade 2, PBD day 5   - as seen on CT/CTA, no aneurysm seen on CTA   - Repeat Head CT this morning stable               - Plans for diagnostic cerebral angiogram this morning (Dr. Caryn Hartley to perform) to further assess for cerebral aneursym              - Continue Nimotop day 2/21 for prevention of delayed cerebral ischemia               - Continue Keppra 500 BID for seizure ppx              - NS at 125 ml/hr to maintain euvolemia              - Strict I&O              - ECHO pending              - every hour neuro checks              - SBP goal less than 140, Cardene PRN   - will order scheduled Colace post angio procedure (patient reporting no BM since Monday)              - PT/OT/SLP evals as able   - NIS following, Neurosurgery following     2. Hx of HTN   - SBP goal <140   - Cardene PRN   - Hospitalist following    3.  DM type 1   - sliding scale insulin and point of care glucose checks   - Management per hospitalist     Activity: Up with assistance   DVT ppx: SCDs  Dispo: TBD     Plan d/w Dr. Rogelio Julien, ICU RN, and patient.       Francesco Abbott NP

## 2019-07-20 NOTE — H&P
Hospitalist Progress Note  Martín Goel MD  Answering service: [phone] OR [phone] from in house phone  Cell:       Date of Service:  2019  NAME:  Cristopher Slater  :  1950  MRN:  [id number]      Admission Summary:    The patient is a 78-year-old female with past medical history of hypertension, diabetes mellitus type 2, history of acute renal failure, atrial fibrillation, who presents to the hospital with the above-mentioned symptoms. History was primarily obtained from the patient. The patient reports that for the last one week or so, she has been sick. The patient reports she started having some \"food poisoning\" associated with diarrhea, nausea, vomiting, poor appetite; went to [facility] about 4 days back; was given some IV fluids; and discharged home. The patient reports that she continued to feel weak. Reports that yesterday, she went to her primary care physician, was told that she has UTI, and was prescribed antibiotics. The  reports that he came in today around noon to have lunch with his wife, found the patient lying on the couch, got her up to get to the bathroom, but \"she became limp and fell on the ground. \"  The patient also reports that she had a second fall today around 2 hours ago prior to coming over here. The  reports that she was very weak and he is not sure whether she passed out or not.   The patient came to the ER as a code stroke because she had right-sided weakness and had some positive pronator drift and unequal  strength and was brought to the hospital.  The patient had a CT scan done, which showed acute subarachnoid hemorrhage in the left sylvian fissure and basal cistern and the patient was transferred to [address] [place] [address].    Interval history / Subjective:     Pt resting in bed, no complaints, S/P cerebral angiogram, denies CP, SOB, cough, fever, chills, N/V Assessment & Plan:     1. Acute subarachnoid hemorrhage  -S/P cerebral angiogram today, results awaited  - Optimize BP control, currently on cardene gtt, keep SBP less than 140  - Continue Nimodipine  - continue keppra   - await ECHO results  - PT eval pending  - CT head with \" Stable small to moderate volume subarachnoid hemorrhage \"  - neurochecks, seizure precaution  - appreciate NS and BERRY input  2. Acute kidney injury,  - likely prerenal due to recent GI loss  - improving  - continue IV hydration  - avoid nephrotoxic meds    3. Diabetes mellitus type 2,  - better controlled  - continue SSNI, accuchecks, monitor       4. Accelerated hypertension.  -tight BP control to keep SBP less than 140  - on Cardene gtt  - monitor   -   Code status: full  DVT prophylaxis: SCD    Care Plan discussed with: Patient/Family  Disposition: Home w/Family     Hospital Problems  Date Reviewed: 7/20/2019          Codes Class Noted POA    SAH (subarachnoid hemorrhage) ([facility] [address] ICD-10-CM: I60.9  ICD-9-CM: 739  7/20/2019 Yes                Review of Systems:   A comprehensive review of systems was negative except for that written in the HPI. Vital Signs:    Last 24hrs VS reviewed since prior progress note. Most recent are:  Visit Vitals  /67   Pulse 79   Temp 98.4 °F (36.9 °C)   Resp 14   Wt 90.4 kg (199 lb 4.7 oz)   SpO2 97%   BMI 34.21 kg/m²         Intake/Output Summary (Last 24 hours) at 7/20/2019 1457  Last data filed at 7/20/2019 1400  Gross per 24 hour   Intake 3632.84 ml   Output 400 ml   Net 3232.84 ml        Physical Examination:             Constitutional:  No acute distress, cooperative, pleasant    ENT:  Oral mucous moist, oropharynx benign. Neck supple,    Resp:  CTA bilaterally. No wheezing/rhonchi/rales. No accessory muscle use   CV:  Regular rhythm, normal rate, no murmurs, gallops, rubs    GI:  Soft, non distended, non tender.  normoactive bowel sounds, no hepatosplenomegaly     Musculoskeletal:  No edema, warm, 2+ pulses throughout    Neurologic:  Moves all extremities. AAOx3, CN II-XII reviewed, 5/5 strength x 4     Psych:  Good insight, Not anxious nor agitated. Data Review:    Review and/or order of clinical lab test      Labs:     Recent Labs     07/20/19  0359 07/19/19  1601   WBC 10.0 11.7*   HGB 12.7 15.1   HCT 39.8 46.0    276     Recent Labs     07/20/19  0359 07/19/19  1601    137   K 3.5 3.5    99   CO2 28 31   BUN 38* 50*   CREA 1.33* 1.74*   * 231*   CA 8.9 9.7     No results for input(s): SGOT, GPT, ALT, AP, TBIL, TBILI, TP, ALB, GLOB, GGT, AML, LPSE in the last 72 hours. No lab exists for component: AMYP, HLPSE  No results for input(s): INR, PTP, APTT in the last 72 hours. No lab exists for component: INREXT   No results for input(s): FE, TIBC, PSAT, FERR in the last 72 hours. No results found for: FOL, RBCF   No results for input(s): PH, PCO2, PO2 in the last 72 hours. No results for input(s): CPK, CKNDX, TROIQ in the last 72 hours.     No lab exists for component: CPKMB  No results found for: CHOL, CHOLX, CHLST, CHOLV, HDL, LDL, LDLC, DLDLP, TGLX, TRIGL, TRIGP, CHHD, CHHDX  Lab Results   Component Value Date/Time    Glucose (POC) 153 (H) 07/20/2019 11:12 AM    Glucose (POC) 180 (H) 07/20/2019 07:07 AM    Glucose (POC) 142 (H) 07/20/2019 12:32 AM    Glucose (POC) 168 (H) 07/19/2019 07:24 PM    Glucose (POC) 343 (H) 07/15/2019 05:56 AM     Lab Results   Component Value Date/Time    Color YELLOW/STRAW 07/15/2019 04:48 AM    Appearance CLEAR 07/15/2019 04:48 AM    Specific gravity 1.021 07/15/2019 04:48 AM    pH (UA) 5.5 07/15/2019 04:48 AM    Protein 300 (A) 07/15/2019 04:48 AM    Glucose >1,000 (A) 07/15/2019 04:48 AM    Ketone 15 (A) 07/15/2019 04:48 AM    Bilirubin NEGATIVE  07/15/2019 04:48 AM    Urobilinogen 0.2 07/15/2019 04:48 AM    Nitrites NEGATIVE  07/15/2019 04:48 AM    Leukocyte Esterase NEGATIVE  07/15/2019 04:48 AM    Epithelial cells FEW 07/15/2019 04:48 AM    Bacteria NEGATIVE  07/15/2019 04:48 AM    WBC 0-4 07/15/2019 04:48 AM    RBC 0-5 07/15/2019 04:48 AM         Medications Reviewed:     Current Facility-Administered Medications   Medication Dose Route Frequency    heparin 4000 units/1000 mL (4 units/mL) in NS 4000 units/1000ml NS infusion **FOR ANGIO USE ONLY**        docusate sodium (COLACE) capsule 100 mg  100 mg Oral DAILY    niCARdipine (CARDENE) 25 mg in 0.9% sodium chloride 250 mL infusion  0-15 mg/hr IntraVENous TITRATE    0.9% sodium chloride infusion  125 mL/hr IntraVENous CONTINUOUS    ondansetron (ZOFRAN) injection 4 mg  4 mg IntraVENous Q6H PRN    naloxone (NARCAN) injection 0.4 mg  0.4 mg IntraVENous PRN    acetaminophen (TYLENOL) tablet 650 mg  650 mg Oral Q4H PRN    Or    acetaminophen (TYLENOL) solution 650 mg  650 mg Per NG tube Q4H PRN    Or    acetaminophen (TYLENOL) suppository 650 mg  650 mg Rectal Q4H PRN    levETIRAcetam (KEPPRA) 500 mg in 0.9% sodium chloride 100 mL IVPB  500 mg IntraVENous Q12H    famotidine (PF) (PEPCID) 20 mg in sodium chloride 0.9% 10 mL injection  20 mg IntraVENous Q24H    glucose chewable tablet 16 g  4 Tab Oral PRN    dextrose (D50W) injection syrg 12.5-25 g  25-50 mL IntraVENous PRN    glucagon (GLUCAGEN) injection 1 mg  1 mg IntraMUSCular PRN    insulin lispro (HUMALOG) injection   SubCUTAneous Q6H    niMODipine (NIMOTOP) capsule 60 mg  60 mg Oral Q4H     ______________________________________________________________________  EXPECTED LENGTH OF STAY: - - -  ACTUAL LENGTH OF STAY:          1                 John Cabrera MD

## 2019-07-20 NOTE — BRIEF OP NOTE
NEUROINTERVENTIONAL SURGERY POST-PROCEDURE NOTE    PROCEDURE:  Cerebral angiogram    VESSEL(S) STUDIED:  1. Bilateral CCAs, cervical and cerebral  2. Bilateral vertebral arteries  3. Bilateral costocervical and thyrocervical trunks  4. Right external iliac artery VESSEL(S) TREATED:  1. none      PRELIMINARY REPORT & DISPOSITION:   1.  1 mm x 1 mm triangular outpouching on distal right NGUYEN trunk; ? Infundibulum, tiny aneurysm  2. Right PComA infundibulum with vessel arising eccentrically from the dome  3. No obvious aneurysm to explain blood pattern  4. 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 Cross filling to left NGUYEN via patent anterior communicating artery  No evidence of vasospasm    COMPLICATIONS: None. FOLLOW-UP:  Recommend repeat angiogramin 3-5 days  Remain in ICU for further care and monitoring DATE OF SERVICE:  7/20/2019 11:30 AM    ATTENDING SURGEON(S):  Rosendo Smith M.D. ANESTHESIA:   Conscious sedation  Fentanyl = 100 mcg  Versed = 2 mg    MEDICATIONS:   See nursing record    PUNCTURE SITE:  Right common femoral artery. Arteriotomy closed with 6F Angioseal  Flat x 2 hours. SPECIMENS REMOVED: None    EBL:   Minimal    CONTRAST:  Isovue, 300 = 150 cc       Rosendo Smith M.D.     Professor, Departments of Radiology, Neurology and Neurological Surgery  [facility]

## 2019-07-20 NOTE — PROGRESS NOTES
[facility]  INTENSIVIST DAILY PROGRESS NOTE  Name: Alexandra Yeboah   : 1950   MRN: [id number]   Date: 2019 11:15 AM   I have reviewed the flowsheet and previous days notes. The patient is admitted to the ICU for [facility]. Just returned from diagnostic angiogram.  The patient originally presented to the ED with nausea and vomiting. CT done for some right sided weakness showed acute left SAH. Currently the patient denies complaints. Specifically no pain or nausea. PMH:  htn  DM  Renal failure  A.fib  Denies prior sleep study    Vital Signs:    Visit Vitals  /88 (BP 1 Location: Right arm, BP Patient Position: At rest)   Pulse 87   Temp 98.5 °F (36.9 °C)   Resp 17   Wt 90.4 kg (199 lb 4.7 oz)   SpO2 97%   BMI 34.21 kg/m²       O2 Device: Nasal cannula   O2 Flow Rate (L/min): 3 l/min   Temp (24hrs), Av.6 °F (37 °C), Min:98.1 °F (36.7 °C), Max:99.4 °F (37.4 °C)       Intake/Output:   Last shift:       07 -  190  In: 580 [I.V.:580]  Out: -   Last 3 shifts: 1901 -  0700  In: 2127.1 [I.V.:2127.1]  Out: 400 [Urine:400]    Intake/Output Summary (Last 24 hours) at 2019 1115  Last data filed at 2019 0800  Gross per 24 hour   Intake 2707.09 ml   Output 400 ml   Net 2307.09 ml     Physical Exam:  General:  Alert, cooperative, no distress, appears stated age. Head:  Normocephalic   Eyes:  EOMs intact. Nose: Nares normal.                Lungs:   Clear to auscultation bilaterally. Chest wall:  No tenderness or deformity.    Heart:  Regular rate and rhythm   Abdomen:   Obese, nontender   Extremities: No edema       Skin: dry       Neurologic: Grossly nonfocal       DATA:   Current Facility-Administered Medications   Medication Dose Route Frequency    heparin 4000 units/1000 mL (4 units/mL) in NS infusion **FOR ANGIO USE ONLY**  16,000 Units/hr IntraSHEAth RAD PRN    heparin 4000 units/1000 mL (4 units/mL) in NS 4000 units/1000ml NS infusion **FOR ANGIO USE ONLY**        0.9% sodium chloride infusion  50 mL/hr IntraVENous CONTINUOUS    fentaNYL citrate (PF) injection 100 mcg  100 mcg IntraVENous RAD PRN    midazolam (VERSED) injection 5 mg  5 mg IntraVENous RAD PRN    verapamil (ISOPTIN) 2.5 mg/mL injection 10 mg  10 mg IntraVENous RAD PRN    niCARdipine (CARDENE) 25 mg in 0.9% sodium chloride 250 mL infusion  0-15 mg/hr IntraVENous TITRATE    0.9% sodium chloride infusion  125 mL/hr IntraVENous CONTINUOUS    ondansetron (ZOFRAN) injection 4 mg  4 mg IntraVENous Q6H PRN    naloxone (NARCAN) injection 0.4 mg  0.4 mg IntraVENous PRN    acetaminophen (TYLENOL) tablet 650 mg  650 mg Oral Q4H PRN    Or    acetaminophen (TYLENOL) solution 650 mg  650 mg Per NG tube Q4H PRN    Or    acetaminophen (TYLENOL) suppository 650 mg  650 mg Rectal Q4H PRN    levETIRAcetam (KEPPRA) 500 mg in 0.9% sodium chloride 100 mL IVPB  500 mg IntraVENous Q12H    famotidine (PF) (PEPCID) 20 mg in sodium chloride 0.9% 10 mL injection  20 mg IntraVENous Q24H    glucose chewable tablet 16 g  4 Tab Oral PRN    dextrose (D50W) injection syrg 12.5-25 g  25-50 mL IntraVENous PRN    glucagon (GLUCAGEN) injection 1 mg  1 mg IntraMUSCular PRN    insulin lispro (HUMALOG) injection   SubCUTAneous Q6H    niMODipine (NIMOTOP) capsule 60 mg  60 mg Oral Q4H       Telemetry: SR          Labs:  Recent Labs     07/20/19  0359 07/19/19  1601   WBC 10.0 11.7*   HGB 12.7 15.1   HCT 39.8 46.0    276     Recent Labs     07/20/19  0359 07/19/19  1601    137   K 3.5 3.5    99   CO2 28 31   * 231*   BUN 38* 50*   CREA 1.33* 1.74*   CA 8.9 9.7       Imaging:  I have personally reviewed the patients radiographs and reports. Chest/abd CT negative for acute process       IMPRESSION:   · SAH  · Renal insufficiency: Cr improving with IV fluids  · DM  · Htn   PLAN:   · SAH management per NS.   Angio report pending  · Cardene as needed  · Neuro checks  · Monitor labs GLOBAL ISSUES:     · DVT Prophylaxis: SCDS  ·          My assessment/plan was discussed with: nurse        Moe Avila MD

## 2019-07-20 NOTE — PROGRESS NOTES
Neurocritical care Brief Progress Note    Patient is s/p diagnostic cerebral angiogram today performed by Dr. Caryn Hartley for further evaluation of a cerebral aneurysm. Patient seen and examined post-angio. She is awake. She complains of a slight 3/10 dull frontal headache that just started after laying flat for bed linen change. She denies any acute neurological symptoms or further complaints. Cerebral angiogram showed a 1 mm x 1 mm triangular outpouching on distal right NGUYEN trunk; ? Infundibulum, tiny aneurysm. Right PComA infundibulum with vessel arising eccentrically from the dome. No obvious aneurysm to explain blood pattern. Occluded left ICA at bifurcation with reconstitution of the cervical ICA via vasovasorum; remainder of the cervical ICA is diminutive, fills the ophthalmic artery and is extremely stenotic in the supraclinoid segment with only minimal filling of the MCA trunk. Cross filling to left NGUYEN via patent anterior communicating artery. No evidence of vasospasm. She is alert and oriented x 4. Follows commands. PERRL. Visual fields intact. EOM intact. No aphasia. No dysarthria. 5/5 strength throughout all extremities. No drift. No involuntary movements. No ataxia. Gait deferred. Right groin site clean, dry, and intact. Distal pulses +1 bilaterally. No bleeding, hematoma, or bruising at site. Ok to eat if swallowing intact. Recommend repeat angiogram in 3-5 days per Dr. Caryn Hartley. Continue to monitor in ICU. SBP goal <140, neurovascular checks post-angio protocol. Plan discussed with Dr. Caryn Hartley, SOURAV, and patient.      Stormy Bence, [facility]  Neurocritical care NP

## 2019-07-20 NOTE — H&P
[facility]  HISTORY AND PHYSICAL    Name:  Carlos Alberto Avila  MR#:  [id number]  :  1950  ACCOUNT #:  [de-identified]  ADMIT DATE:  2019      CHIEF COMPLAINT:  Intracranial bleed. HISTORY OF PRESENT ILLNESS:  The patient is a 18-year-old female with past medical history of hypertension, diabetes mellitus type 2, history of acute renal failure, atrial fibrillation, who presents to the hospital with the above-mentioned symptoms. History was primarily obtained from the patient. The patient reports that for the last one week or so, she has been sick. The patient reports she started having some \"food poisoning\" associated with diarrhea, nausea, vomiting, poor appetite; went to [facility] about 4 days back; was given some IV fluids; and discharged home. The patient reports that she continued to feel weak. Reports that yesterday, she went to her primary care physician, was told that she has UTI, and was prescribed antibiotics. The  reports that he came in today around noon to have lunch with his wife, found the patient lying on the couch, got her up to get to the bathroom, but \"she became limp and fell on the ground. \"  The patient also reports that she had a second fall today around 2 hours ago prior to coming over here. The  reports that she was very weak and he is not sure whether she passed out or not. The patient came to the ER as a code stroke because she had right-sided weakness and had some positive pronator drift and unequal  strength and was brought to the hospital.  The patient had a CT scan done, which showed acute subarachnoid hemorrhage in the left sylvian fissure and basal cistern and the patient was transferred to [address]  Currently, the patient is resting in bed. Reports that when she fell today, she bruised her right shoulder and is hurting over there. Besides that, denies any complaints or problems.   The patient denies any headache. Denies blurry vision, sore throat, trouble swallowing, trouble with speech, any shortness of breath, cough, fever, chills, abdominal pain, constipation, diarrhea, urinary symptoms, focal or generalized neurological weakness, recent travel, sick contacts, any hematemesis, melena, hemoptysis, hematuria, or any other concerns or problems. The patient reports that she does not take any anticoagulation on a daily basis. PAST MEDICAL HISTORY:  See above. HOME MEDICATIONS:  Currently, the patient is on,  1. Benicar 40/25 mg daily. 2.  Zofran as needed. 3.  Metoprolol 12.5 mg b.i.d.  4.  Allegra. 5.  Micardis/hydrochlorothiazide 80/25 mg daily. 6.  Pantoprazole 40 mg daily. 7.  Lipitor 40 mg daily. 8.  Calcium/cholecalciferol. FAMILY HISTORY:  Father has history of coronary artery disease. SOCIAL HISTORY:  Denies tobacco abuse, alcohol use, IV drug abuse. ALLERGIES:  NO KNOWN DRUG ALLERGIES. REVIEW OF SYMPTOMS:  All systems were reviewed and were found to be essentially negative except for the symptoms mentioned above. PHYSICAL EXAMINATION:  VITAL SIGNS:  Temperature 98.7, pulse 121, respiratory rate 18, blood pressure 140/86, pulse oximetry 97% on room air. GENERAL:  Alert and oriented x3, awake, in no acute distress, resting in bed, pleasant female, appears to be stated age. HEENT:  Pupils equal and reactive to light. Dry mucous membranes. Tympanic membranes clear. NECK:  Supple. CHEST:  Clear to auscultation bilaterally. CORONARY:  S1, S2 are heard. ABDOMEN:  Soft, nontender, nondistended. Bowel sounds are physiological.  EXTREMITIES:  No clubbing, no cyanosis, no edema. [facility]:  Pleasant mood and affect. Cranial nerves II-XII are grossly intact. 4/5 strength, right upper and right lower extremity. Sensory grossly within normal limits. SKIN:  Warm. LABORATORY DATA:  White count 11.7, hemoglobin 15.1, hematocrit 46, platelets 330.   Sodium 137, potassium 3.5, chloride 99, bicarbonate 31, anion gap 7, glucose 231, BUN 50, creatinine 1.74, calcium 9.7. CT of the head shows acute subarachnoid hemorrhage in the left sylvian fissure and basal cistern and no evidence of acute infarct. CT of the head shows severe atherosclerotic changes in the cervical internal carotid artery, greater than 90% stenosis of the proximal left internal carotid artery, moderate stenosis in right internal carotid artery approximately 50%. No intracranial aneurysm, minimal irregularity of the M2 segment, maybe related to mild vasospasm, stable degree of subarachnoid hemorrhage when compared to CT of the head performed earlier in the day. EKG is not available in the chart. ASSESSMENT AND PLAN:  1. Acute subarachnoid hemorrhage. They were concerned that this is an aneurysmal bleed, but CTA does not indicate the same. Appreciate neurointerventional radiology and neurosurgery consult. We will admit the patient to ICU bed. We will optimize blood pressure control, start the patient on IV nicardipine. We will provide IV hydration. We will provide neurovascular checks. Any changes in neurological status will mandate emergent intervention. We will start Keppra b.i.d. We will also repeat a CT scan in the morning. Neurointerventional will perform a cerebral angiogram in the morning. Further intervention per hospital course. Continue to monitor. The patient has significant carotid stenosis with the imaging being evaluated by Neurointerventional Radiology and they do not recommend any acute intervention at this point of time. We will defer to Neurointerventional Radiology for further management and evaluation. Continue to closely monitor. 2.  Acute kidney injury, likely prerenal secondary to dehydration. We will start the patient on gentle IV hydration, repeat labs in the morning. Continue to monitor. Further intervention per hospital course.   Avoid nephrotoxic medication and renally dose all other medications. Monitor for now. 3.  Diabetes mellitus type 2, poorly controlled. The patient will be on sliding scale NovoLog insulin, Accu-Cheks, diet control, close monitoring. Further intervention per hospital course. 4.  Accelerated hypertension. The patient had a blood pressure of 158/90 on arrival to the ER. We will provide nicardipine drip. Close monitoring. Further intervention per hospital course. Reassess as needed. Continue to monitor. 5.  Gastrointestinal and deep venous thrombosis prophylaxis. The patient is on sequential compression devices.         José Luis Sibley MD      [id number]  D:  07/19/2019 19:25  T:  07/19/2019 20:27  JOB #:  [id number]

## 2019-07-20 NOTE — PROGRESS NOTES
0730: Bedside and Verbal shift change report given to Pham Pepe (oncoming nurse) by Kyleigh Becker RN (offgoing nurse). Report included the following information SBAR, Kardex, ED Summary, Intake/Output, MAR, Accordion, Recent Results, Med Rec Status and Cardiac Rhythm NSR.     0800: Assessment complete. Pt neuro intact. Patient sent to angio for diagnostic angiogram.    1100: Patient back on unit, reassessed, no neuro changes. No bleeding or hematoma from right groin site. 1300: SBP noted to be 160. Cardene gtt restarted.

## 2019-07-20 NOTE — CONSULTS
Neurointerventional [address] [place], [place]  Neurocritical Care Nurse Practitioner  [phone]    Patient: La Nena Flores MRN: [id number]  SSN: [id number]    YOB: 1950  Age: 80 y.o. Sex: female      Chief Complaint: Headache and weakness    Subjective:      La Nena Flores is a 80 y.o. female who presented to [facility] ED due to complaints of headache and weakness. Pt reports on 19 she was vomiting, reports the headache started after the vomiting the next day. She has tried taking tylenol for her headache with some relief. She reports a hx of minor headache, but that this headache was worse than usual for her. Pt reports weakness x 2 days, reports today the weakness was worse on her way to the bathroom causing a GLF. Pt denies LOC. Pt is a type I diabetic, reports she checked her BG and was 199. She reports her BG has been elevated over the past 5 days, more so than usual. In the ED, a stat head CT was performed which showed SAH. 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 She was transferred to [facility] for higher level of care. Presently, she reports dull headache, rates 4/10. She denies any dizziness, numbness, tingling, reports she is nauseated but believes this is due to being hungry, reports minimal neck pain but denies neck stiffness. She is not a smoker and reports a family hx of aneurysm, reports her uncle  from cerebral aneurysm.      Past Medical History:   Diagnosis Date    ARF (acute renal failure) ([address]     13 - had a viral gastroenteritis     Diabetes ([facility]     Duodenal ulcer disease     EGD  - aspirin stopped afterwards    Hypertension     Leg edema     PAF (paroxysmal atrial fibrillation) ([facility])     1 hour epsiode 13     Family History   Problem Relation Age of Onset    Other Other         patient does not know    Coronary Artery Disease Father      Social History     Tobacco Use    Smoking status: Never Smoker    Smokeless tobacco: Never Used   Substance Use Topics    Alcohol use: No      Prior to Admission Medications   Prescriptions Last Dose Informant Patient Reported? Taking? Calcium-Cholecalciferol, D3, (CALCIUM 600 WITH VITAMIN D3) 600 mg(1,500mg) -400 unit cap   Yes No   Sig: Take  by mouth. OTHER   Yes No   Sig: mycardis hct   atorvastatin (LIPITOR) 40 mg tablet   Yes No   Sig: Take 40 mg by mouth daily. ciprofloxacin HCl (CIPRO) 250 mg tablet   Yes No   Sig: Take 250 mg by mouth two (2) times a day. fexofenadine (ALLEGRA) 180 mg tablet   Yes No   Sig: Take  by mouth daily. insulin NPH (NOVOLIN, HUMULIN) 100 unit/mL injection   Yes No   Sig: by SubCUTAneous route once. insulin lispro (HUMALOG) 100 unit/mL injection   Yes No   Sig: by SubCUTAneous route. metoprolol (LOPRESSOR) 25 mg tablet   No No   Sig: TAKE 1/2 TABLET BY MOUTH TWO (2) TIMES A DAY. olmesartan-hydroCHLOROthiazide (BENICAR HCT) 40-25 mg per tablet   Yes No   Sig: Take 1 Tab by mouth daily. omega-3 fatty acids-vitamin e (FISH OIL) 1,000 mg cap   Yes No   Sig: Take 1 Cap by mouth. ondansetron (ZOFRAN ODT) 4 mg disintegrating tablet   No No   Sig: Take 1 Tab by mouth every eight (8) hours as needed for Nausea. pantoprazole (PROTONIX) 40 mg tablet   No No   Sig: Take 1 Tab by mouth daily. rosuvastatin (CRESTOR) 40 mg tablet   Yes No   Sig: Take 40 mg by mouth nightly. telmisartan-hydrochlorothiazide (MICARDIS HCT) 80-25 mg per tablet   No No   Sig: Take 1 Tab by mouth daily. Facility-Administered Medications: None       No Known Allergies    Review of Systems:  Pertinent items are noted in the History of Present Illness. Objective:     Vitals:    07/19/19 1810   BP: 140/86   Pulse: (!) 123   Resp: 18   Temp: 98.7 °F (37.1 °C)   SpO2: 97%      Physical Exam:  GENERAL: Calm, cooperative, NAD  SKIN: Warm, dry, color appropriate for ethnicity.      Neurologic Exam:  Mental Status:  Alert and oriented x 4. Appropriate affect, mood and behavior. Language:    Normal fluency, repetition, comprehension and naming. Cranial Nerves:   Pupils 3 mm, equal, round and reactive to light. Visual fields full to confrontation. Extraocular movements intact. Facial sensation intact. Full facial strength, no asymmetry. Hearing grossly intact bilaterally. No dysarthria. Tongue protrudes to midline, palate elevates symmetrically. Shoulder shrug 5/5 bilaterally. Motor:    No pronator drift. Bulk and tone normal.      5/5 power in all extremities proximally and distally. No involuntary movements. Sensation:    Sensation intact throughout to light touch. Coordination & Gait: Gait deferred. FTN and HTS intact with no ataxia present. Labs:  Lab Results   Component Value Date/Time    WBC 11.7 (H) 07/19/2019 04:01 PM    HGB 15.1 07/19/2019 04:01 PM    HCT 46.0 07/19/2019 04:01 PM    PLATELET 941 77/29/3516 04:01 PM    MCV 92.0 07/19/2019 04:01 PM     Lab Results   Component Value Date/Time    Sodium 137 07/19/2019 04:01 PM    Potassium 3.5 07/19/2019 04:01 PM    Chloride 99 07/19/2019 04:01 PM    CO2 31 07/19/2019 04:01 PM    Anion gap 7 07/19/2019 04:01 PM    Glucose 231 (H) 07/19/2019 04:01 PM    BUN 50 (H) 07/19/2019 04:01 PM    Creatinine 1.74 (H) 07/19/2019 04:01 PM    BUN/Creatinine ratio 29 (H) 07/19/2019 04:01 PM    GFR est AA 35 (L) 07/19/2019 04:01 PM    GFR est non-AA 29 (L) 07/19/2019 04:01 PM    Calcium 9.7 07/19/2019 04:01 PM     Imaging (Independently reviewed by Dr. Sandrita Yañez and myself):  CT head on 7/19/19 showed acute subarachnoid hemorrhage in the left sylvian fissure and basal cisterns. No evidence for acute infarct. CTA head and neck on 7/19/19 showed severe atherosclerotic change in the cervical internal carotid artery. Greater than 90% stenosis of the proximal left internal carotid artery.  Moderate stenosis right internal carotid artery approximately 50%. No intracranial aneurysm. Minimal irregularity of M2 segments may be related to mild vasospasm. Stable degree of subarachnoid hemorrhage when compared to the CT of the head performed earlier in the day. Assessment:     Hospital Problems  Date Reviewed: 3/19/2013          Codes Class Noted POA    ICH (intracerebral hemorrhage) ([facility] [address] ICD-10-CM: I61.9  ICD-9-CM: 321  7/19/2019 Unknown            Plan:     SAH, possibly due to ruptured cerebral aneurysm, Savage Mcdonald 1, Perez Grade 2   - Plans for diagnostic cerebral angiogram in AM. NPO after midnight please. - Day 1/21 of Nimotop   - Keppra 500 BID for seizure ppx   - NS at 125 ml/hr to maintain euvolemia   - Strict I&O   - ECHO pending   - q 1 neuro checks   - SBP goal less than 140,  Cardene PRN   - PT/OT/SLP evals as able    DMI   -SSI and accuchecks per hospitalist    I have discussed the diagnosis and the intended plan as seen in the above orders with Dr. Dylon Denis, the patient and the primary RN. Patient/family at bedside, updated on current plan of care and are in agreement. All questions were answered. Thank you for this consult and participating in the care of this patient. Signed By: Kay Weathers NP     July 19, 2019        I saw and evaluated the patient and reviewed all of her imaging. I discussed the findings, assessment and plan with the resident and agree with the resident's findings and plan as documented in the resident's note. Blood pattern is primarily perimesencephalic with some SAH in the sylvian fissures, left greater than right. The ventricles are normal in size. The CTA shows no obvious aneurysm, AVM, or other bleeding source. Will perform an angiogram in the am and evaluate for a bleeding source and vasospasm. Routine post SAH orders in place. Princess Mata M.D.   Attending, NIS

## 2019-07-21 LAB
ALBUMIN SERPL-MCNC: 2.6 G/DL (ref 3.5–5)
ALBUMIN/GLOB SERPL: 0.8 {RATIO} (ref 1.1–2.2)
ALP SERPL-CCNC: 64 U/L (ref 45–117)
ALT SERPL-CCNC: 17 U/L (ref 12–78)
ANION GAP SERPL CALC-SCNC: 6 MMOL/L (ref 5–15)
AST SERPL-CCNC: 13 U/L (ref 15–37)
BILIRUB SERPL-MCNC: 0.6 MG/DL (ref 0.2–1)
BUN SERPL-MCNC: 28 MG/DL (ref 6–20)
BUN/CREAT SERPL: 24 (ref 12–20)
CALCIUM SERPL-MCNC: 8.3 MG/DL (ref 8.5–10.1)
CHLORIDE SERPL-SCNC: 110 MMOL/L (ref 97–108)
CO2 SERPL-SCNC: 26 MMOL/L (ref 21–32)
CREAT SERPL-MCNC: 1.17 MG/DL (ref 0.55–1.02)
ECHO AO ROOT DIAM: 3.9 CM
ECHO AV AREA PEAK VELOCITY: 1.2 CM2
ECHO AV AREA VTI: 1.3 CM2
ECHO AV MEAN GRADIENT: 13.8 MMHG
ECHO AV PEAK GRADIENT: 24.3 MMHG
ECHO AV PEAK VELOCITY: 246.46 CM/S
ECHO AV VTI: 47.84 CM
ECHO LV INTERNAL DIMENSION DIASTOLIC: 4.83 CM (ref 3.9–5.3)
ECHO LV INTERNAL DIMENSION SYSTOLIC: 2.64 CM
ECHO LV IVSD: 0.96 CM (ref 0.6–0.9)
ECHO LV MASS 2D: 172.4 G (ref 67–162)
ECHO LV MASS INDEX 2D: 88.3 G/M2 (ref 43–95)
ECHO LV POSTERIOR WALL DIASTOLIC: 0.84 CM (ref 0.6–0.9)
ECHO LVOT DIAM: 1.97 CM
ECHO LVOT PEAK GRADIENT: 3.5 MMHG
ECHO LVOT PEAK VELOCITY: 93.31 CM/S
ECHO LVOT SV: 63.4 ML
ECHO LVOT VTI: 20.83 CM
ECHO PULMONARY ARTERY SYSTOLIC PRESSURE (PASP): 55 MMHG
ECHO RV TAPSE: 2.22 CM (ref 1.5–2)
EST. AVERAGE GLUCOSE BLD GHB EST-MCNC: 209 MG/DL
GLOBULIN SER CALC-MCNC: 3.4 G/DL (ref 2–4)
GLUCOSE BLD STRIP.AUTO-MCNC: 206 MG/DL (ref 65–100)
GLUCOSE BLD STRIP.AUTO-MCNC: 208 MG/DL (ref 65–100)
GLUCOSE BLD STRIP.AUTO-MCNC: 220 MG/DL (ref 65–100)
GLUCOSE BLD STRIP.AUTO-MCNC: 279 MG/DL (ref 65–100)
GLUCOSE SERPL-MCNC: 215 MG/DL (ref 65–100)
HBA1C MFR BLD: 8.9 % (ref 4.2–6.3)
POTASSIUM SERPL-SCNC: 3.8 MMOL/L (ref 3.5–5.1)
PROT SERPL-MCNC: 6 G/DL (ref 6.4–8.2)
SERVICE CMNT-IMP: ABNORMAL
SODIUM SERPL-SCNC: 142 MMOL/L (ref 136–145)

## 2019-07-21 PROCEDURE — 97161 PT EVAL LOW COMPLEX 20 MIN: CPT

## 2019-07-21 PROCEDURE — 74011250636 HC RX REV CODE- 250/636: Performed by: RADIOLOGY

## 2019-07-21 PROCEDURE — 74011636637 HC RX REV CODE- 636/637: Performed by: FAMILY MEDICINE

## 2019-07-21 PROCEDURE — 97116 GAIT TRAINING THERAPY: CPT

## 2019-07-21 PROCEDURE — 74011250636 HC RX REV CODE- 250/636: Performed by: FAMILY MEDICINE

## 2019-07-21 PROCEDURE — 74011000258 HC RX REV CODE- 258: Performed by: FAMILY MEDICINE

## 2019-07-21 PROCEDURE — 74011250636 HC RX REV CODE- 250/636: Performed by: NURSE PRACTITIONER

## 2019-07-21 PROCEDURE — 74011250637 HC RX REV CODE- 250/637: Performed by: FAMILY MEDICINE

## 2019-07-21 PROCEDURE — 74011000250 HC RX REV CODE- 250: Performed by: FAMILY MEDICINE

## 2019-07-21 PROCEDURE — 83036 HEMOGLOBIN GLYCOSYLATED A1C: CPT

## 2019-07-21 PROCEDURE — 65610000006 HC RM INTENSIVE CARE

## 2019-07-21 PROCEDURE — 74011250637 HC RX REV CODE- 250/637: Performed by: NURSE PRACTITIONER

## 2019-07-21 PROCEDURE — 77010033678 HC OXYGEN DAILY

## 2019-07-21 PROCEDURE — 36415 COLL VENOUS BLD VENIPUNCTURE: CPT

## 2019-07-21 PROCEDURE — 80053 COMPREHEN METABOLIC PANEL: CPT

## 2019-07-21 RX ORDER — INSULIN GLARGINE 100 [IU]/ML
5 INJECTION, SOLUTION SUBCUTANEOUS DAILY
Status: DISCONTINUED | OUTPATIENT
Start: 2019-07-21 | End: 2019-07-21

## 2019-07-21 RX ORDER — HYDRALAZINE HYDROCHLORIDE 20 MG/ML
10 INJECTION INTRAMUSCULAR; INTRAVENOUS
Status: DISCONTINUED | OUTPATIENT
Start: 2019-07-21 | End: 2019-07-23

## 2019-07-21 RX ORDER — LABETALOL HCL 20 MG/4 ML
20 SYRINGE (ML) INTRAVENOUS
Status: DISCONTINUED | OUTPATIENT
Start: 2019-07-21 | End: 2019-07-22

## 2019-07-21 RX ORDER — INSULIN LISPRO 100 [IU]/ML
5 INJECTION, SOLUTION INTRAVENOUS; SUBCUTANEOUS
Status: DISCONTINUED | OUTPATIENT
Start: 2019-07-21 | End: 2019-07-24

## 2019-07-21 RX ORDER — POLYETHYLENE GLYCOL 3350 17 G/17G
17 POWDER, FOR SOLUTION ORAL DAILY PRN
Status: DISCONTINUED | OUTPATIENT
Start: 2019-07-21 | End: 2019-07-29 | Stop reason: HOSPADM

## 2019-07-21 RX ORDER — INSULIN GLARGINE 100 [IU]/ML
10 INJECTION, SOLUTION SUBCUTANEOUS 2 TIMES DAILY
Status: DISCONTINUED | OUTPATIENT
Start: 2019-07-21 | End: 2019-07-22

## 2019-07-21 RX ADMIN — LABETALOL 20 MG/4 ML (5 MG/ML) INTRAVENOUS SYRINGE 20 MG: at 14:31

## 2019-07-21 RX ADMIN — INSULIN LISPRO 5 UNITS: 100 INJECTION, SOLUTION INTRAVENOUS; SUBCUTANEOUS at 12:02

## 2019-07-21 RX ADMIN — INSULIN LISPRO 3 UNITS: 100 INJECTION, SOLUTION INTRAVENOUS; SUBCUTANEOUS at 06:11

## 2019-07-21 RX ADMIN — INSULIN LISPRO 3 UNITS: 100 INJECTION, SOLUTION INTRAVENOUS; SUBCUTANEOUS at 17:50

## 2019-07-21 RX ADMIN — DOCUSATE SODIUM 100 MG: 100 CAPSULE, LIQUID FILLED ORAL at 08:05

## 2019-07-21 RX ADMIN — NIMODIPINE 60 MG: 30 CAPSULE, LIQUID FILLED ORAL at 00:05

## 2019-07-21 RX ADMIN — INSULIN GLARGINE 10 UNITS: 100 INJECTION, SOLUTION SUBCUTANEOUS at 12:03

## 2019-07-21 RX ADMIN — SODIUM CHLORIDE 500 MG: 900 INJECTION, SOLUTION INTRAVENOUS at 08:05

## 2019-07-21 RX ADMIN — HYDRALAZINE HYDROCHLORIDE 10 MG: 20 INJECTION INTRAMUSCULAR; INTRAVENOUS at 17:02

## 2019-07-21 RX ADMIN — NIMODIPINE 60 MG: 30 CAPSULE, LIQUID FILLED ORAL at 20:23

## 2019-07-21 RX ADMIN — ACETAMINOPHEN 650 MG: 325 TABLET ORAL at 02:52

## 2019-07-21 RX ADMIN — NIMODIPINE 60 MG: 30 CAPSULE, LIQUID FILLED ORAL at 12:03

## 2019-07-21 RX ADMIN — SODIUM CHLORIDE 500 MG: 900 INJECTION, SOLUTION INTRAVENOUS at 20:24

## 2019-07-21 RX ADMIN — INSULIN LISPRO 5 UNITS: 100 INJECTION, SOLUTION INTRAVENOUS; SUBCUTANEOUS at 17:50

## 2019-07-21 RX ADMIN — INSULIN LISPRO 5 UNITS: 100 INJECTION, SOLUTION INTRAVENOUS; SUBCUTANEOUS at 00:06

## 2019-07-21 RX ADMIN — NIMODIPINE 60 MG: 30 CAPSULE, LIQUID FILLED ORAL at 04:00

## 2019-07-21 RX ADMIN — NIMODIPINE 60 MG: 30 CAPSULE, LIQUID FILLED ORAL at 08:05

## 2019-07-21 RX ADMIN — NIMODIPINE 60 MG: 30 CAPSULE, LIQUID FILLED ORAL at 16:09

## 2019-07-21 RX ADMIN — INSULIN LISPRO 5 UNITS: 100 INJECTION, SOLUTION INTRAVENOUS; SUBCUTANEOUS at 12:03

## 2019-07-21 RX ADMIN — FAMOTIDINE 20 MG: 10 INJECTION INTRAVENOUS at 20:23

## 2019-07-21 RX ADMIN — INSULIN GLARGINE 10 UNITS: 100 INJECTION, SOLUTION SUBCUTANEOUS at 20:23

## 2019-07-21 RX ADMIN — ACETAMINOPHEN 650 MG: 325 TABLET ORAL at 18:10

## 2019-07-21 RX ADMIN — SODIUM CHLORIDE 125 ML/HR: 900 INJECTION, SOLUTION INTRAVENOUS at 08:38

## 2019-07-21 NOTE — PROGRESS NOTES
2000- [facility] Care [facility]. Patient pleasant, oriented X 4, moves all extremities spontaneously, turning self,  equal, perrl, no confusion or diplopia. Minor HA, resolved with repositioning and Tylenol. STAND charted, swallows Nimotop pills without difficulty. Large scab present on R. elbow, per PT happened during a fall. 2227- Cardene gtt stopped for /55. Patient rhythm intermittent Aflutter/Afib/NSR, with rate averaging 110.     0600- patient bathed.

## 2019-07-21 NOTE — PROGRESS NOTES
Orders received, chart reviewed and patient evaluated by physical therapy. Recommend patient to discharge to home pending progress with skilled acute care physical therapy. Recommend with nursing patient to complete as able in order to maintain strength, endurance and independence: OOB to chair 3x/day with assist x1 and ambulating with assist x1. Thank you for your assistance. Full evaluation to follow.    Mariam Sears, PT, DPT

## 2019-07-21 NOTE — PROGRESS NOTES
Neurointerventional Surgery Progress Note    Patient: Sandy Medrano MRN: [id number]  SSN: [id number]    YOB: 1950  Age: 80 y.o.   Sex: female      Admit Date: 7/19/2019    LOS: 2 days     Subjective:     Feeling much better; one small headache relieved with tylenol; still no bowel movement but does not feel constipated; had restful night    Objective:     Patient Vitals for the past 24 hrs:   Temp Pulse Resp BP SpO2   07/21/19 0700  96 16 124/75 97 %   07/21/19 0600  94 13 (!) 138/96 98 %   07/21/19 0500  89 17 (!) 129/93 97 %   07/21/19 0400 98.8 °F (37.1 °C) 97 16 115/65 97 %   07/21/19 0338     96 %   07/21/19 0300  (!) 102 15 124/67 97 %   07/21/19 0200  (!) 112 16 125/71 98 %   07/21/19 0100  (!) 114 16 111/61 98 %   07/21/19 0000 98.2 °F (36.8 °C) (!) 135 17 101/88 96 %   07/20/19 2300  (!) 101 15 124/50 96 %   07/20/19 2200  80 15 113/42 97 %   07/20/19 2100  86 18 120/40 96 %   07/20/19 2000 97.9 °F (36.6 °C) 76 16 120/47 97 %   07/20/19 1900  76 22 120/56 96 %   07/20/19 1815  85 17 111/60 97 %   07/20/19 1800  87 18 121/69 97 %   07/20/19 1745  73 15 128/50 95 %   07/20/19 1730  73 21 124/44 96 %   07/20/19 1715  71 15 124/46 95 %   07/20/19 1700  75 18 125/50 97 %   07/20/19 1645  68 17 117/44 96 %   07/20/19 1630  69 18 113/45 97 %   07/20/19 1615  76 15 115/80 97 %   07/20/19 1604  77  146/59    07/20/19 1601  77  146/59    07/20/19 1600 98.9 °F (37.2 °C) 79 15 146/59 97 %   07/20/19 1548  65  148/60    07/20/19 1500  78 19 118/90 96 %   07/20/19 1400  79 14 138/67 97 %   07/20/19 1330  68 11 136/58 97 %   07/20/19 1315  67 13 135/63 97 %   07/20/19 1303  66  158/70    07/20/19 1300  71 14 160/67 99 %   07/20/19 1245  67 14 139/64 98 %   07/20/19 1230  69 15 139/68 98 %   07/20/19 1215  62 14 137/59 97 %   07/20/19 1200 98.4 °F (36.9 °C) 68 15 125/52 97 %   07/20/19 1145  75 16 148/68 99 %   07/20/19 1130  76 14 135/85 98 %   07/20/19 1115  88 23 134/76 98 %   07/20/19 1100 98.5 °F (36.9 °C) 87 17 138/88 97 %   07/20/19 1045  83 16 131/68 96 %   07/20/19 1030  81 16 124/58 96 %   07/20/19 1025  88 13 128/66 98 %   07/20/19 1020  86 16 124/66 95 %   07/20/19 1015  83 17 130/64 95 %   07/20/19 1010  87 15 137/56 97 %   07/20/19 1005  88 14 130/87 97 %   07/20/19 1000  94 18 138/89 96 %   07/20/19 0955  91 15 137/65 97 %   07/20/19 0950  93 12 139/70 98 %   07/20/19 0945  87 13 140/72 98 %   07/20/19 0940  78 13 137/66 99 %   07/20/19 0935  88 18 153/67 98 %   07/20/19 0930  86 17 129/81 98 %   07/20/19 0925  75 18 137/64 98 %   07/20/19 0920  85 16 142/75 99 %   07/20/19 0915  80 17 142/82 98 %   07/20/19 0910  78 15 144/82 98 %   07/20/19 0905 99.4 °F (37.4 °C) 78 14 138/69 97 %   07/20/19 0900  78 17 (!) 133/93 97 %   07/20/19 0800 99 °F (37.2 °C) 63 15 142/61 95 %        Intake and Output:  Current Shift: No intake/output data recorded. Last three shifts: 07/19 1901 - 07/21 0700  In: 7402.4 [P.O.:750; I.V.:6652.4]  Out: 1400 [Urine:1400]    Neurological Exam:   AF VSS; NAD. A&O x 4, fluent speech and appropriate affect. CNII-XII grossly intact. EOMI. PERRLA. Normal strength and sensation throughout. No pronator drift. Right groin c/d/i with no hematoma. 2+ distal pulses BLE. Lab/Data Review: All lab results for the last 24 hours reviewed. Creatinine = 1.17  BUN = 24  Glu = 215    Imaging Reviewed:   See angio report  No new CT    Cardiology:  · Left Ventricle: Normal cavity size, wall thickness, systolic function (ejection fraction normal) and diastolic function. Estimated left ventricular ejection fraction is 56 - 60%. Visually measured ejection fraction. · Interatrial Septum: Agitated saline contrast study was performed. · Pulmonary Artery: Moderate pulmonary hypertension. · Interatrial Septum: Agitated saline contrast study was performed.   · Mitral Valve: Trace mitral valve regurgitation. Assessment:     Active Problems:    SAH (subarachnoid hemorrhage) ([facility] [address] (7/20/2019)        Plan:     1. SAH, possibly due to ruptured cerebral aneurysm, Savage Mcdonald 1, Martell Donato, PBD day 6                 - Continue Nimotop day 3/21 for prevention of delayed cerebral ischemia               - Continue Keppra 500 BID for seizure ppx              - NS at 125 ml/hr to maintain euvolemia              - Strict I&O              - change neurochecks to q2h during day, q4h during evening              - SBP goal less than 140, Cardene PRN              - continue colace; add Miralax prn              - PT/OT/SLP evals as able              - NIS following, Neurosurgery following (discussed with Dr. Ady Martinez)   - follow up angiogram in 3-5 days     2. Hx of HTN              - SBP goal <140              - Cardene PRN              - Hospitalist following     3.  DM type 1              - sliding scale insulin and point of care glucose checks              - Management per hospitalist      4. Paroxysmal A fib/flutter   -Echo unremarkable   -cardiology consult      Activity: Up with assistance   DVT ppx: SCDs  Dispo: TBD     Signed By: Franklin Kwok MD     July 21, 2019

## 2019-07-21 NOTE — PROGRESS NOTES
Hospitalist Progress Note    NAME: Dewayne Dodson   :  1950   MRN:  [id number]       Assessment / Plan:  1.  Acute subarachnoid hemorrhage  -S/P cerebral angiogram, no obvious aneurysm   - Optimize BP control, currently on cardene gtt, keep SBP less than 140  - Continue Nimodipine  - continue keppra   - PT eval pending  - CT head with \" Stable small to moderate volume subarachnoid hemorrhage \"  - neurochecks, seizure precaution  - appreciate NS and BERRY input  CT tomorrow   2.  Acute kidney injury,stable   - likely prerenal due to recent GI loss  - improving  - continue IV hydration  - avoid nephrotoxic meds     3.  Diabetes mellitus type 2,  - better controlled  - continue SSNI, accuchecks, monitor   Resume lower dose of Lantus, she is eating 50% of normal intake          4.  Accelerated hypertension.  -tight BP control to keep SBP less than 140  - on Cardene gtt  - monitor   - 5. Afib: BB no anticoagulation,   Code status: full  DVT prophylaxis: SCD     Care Plan discussed with: Patient/Family  Disposition: Home w/Family  Subjective:     Chief Complaint / Reason for Physician Visit  \"feeling better\". Discussed with RN events overnight. Review of Systems:  Symptom Y/N Comments  Symptom Y/N Comments   Fever/Chills    Chest Pain     Poor Appetite    Edema     Cough    Abdominal Pain     Sputum    Joint Pain     SOB/JAMES    Pruritis/Rash     Nausea/vomit    Tolerating PT/OT     Diarrhea    Tolerating Diet     Constipation    Other       Could NOT obtain due to:      Objective:     VITALS:   Last 24hrs VS reviewed since prior progress note.  Most recent are:  Patient Vitals for the past 24 hrs:   Temp Pulse Resp BP SpO2   19 1203  90  156/71    19 1000  73 18 125/56 97 %   19 0930  67 16 118/53 96 %   19 0900  71 18 101/81 96 %   19 0805  79  139/71    19 0800 98.9 °F (37.2 °C) 67 14 139/71 98 %   19 0700  96 16 124/75 97 %   19 0600  94 13 (!) 138/96 98 %   07/21/19 0500  89 17 (!) 129/93 97 %   07/21/19 0400 98.8 °F (37.1 °C) 97 16 115/65 97 %   07/21/19 0338     96 %   07/21/19 0300  (!) 102 15 124/67 97 %   07/21/19 0200  (!) 112 16 125/71 98 %   07/21/19 0100  (!) 114 16 111/61 98 %   07/21/19 0000 98.2 °F (36.8 °C) (!) 135 17 101/88 96 %   07/20/19 2300  (!) 101 15 124/50 96 %   07/20/19 2200  80 15 113/42 97 %   07/20/19 2100  86 18 120/40 96 %   07/20/19 2000 97.9 °F (36.6 °C) 76 16 120/47 97 %   07/20/19 1900  76 22 120/56 96 %   07/20/19 1815  85 17 111/60 97 %   07/20/19 1800  87 18 121/69 97 %   07/20/19 1745  73 15 128/50 95 %   07/20/19 1730  73 21 124/44 96 %   07/20/19 1715  71 15 124/46 95 %   07/20/19 1700  75 18 125/50 97 %   07/20/19 1645  68 17 117/44 96 %   07/20/19 1630  69 18 113/45 97 %   07/20/19 1615  76 15 115/80 97 %   07/20/19 1604  77  146/59    07/20/19 1601  77  146/59    07/20/19 1600 98.9 °F (37.2 °C) 79 15 146/59 97 %   07/20/19 1548  65  148/60    07/20/19 1500  78 19 118/90 96 %   07/20/19 1400  79 14 138/67 97 %   07/20/19 1330  68 11 136/58 97 %   07/20/19 1315  67 13 135/63 97 %   07/20/19 1303  66  158/70    07/20/19 1300  71 14 160/67 99 %   07/20/19 1245  67 14 139/64 98 %   07/20/19 1230  69 15 139/68 98 %   07/20/19 1215  62 14 137/59 97 %       Intake/Output Summary (Last 24 hours) at 7/21/2019 1207  Last data filed at 7/21/2019 1000  Gross per 24 hour   Intake 4592.08 ml   Output 1400 ml   Net 3192.08 ml        PHYSICAL EXAM:  General: WD, WN. Alert, cooperative, no acute distress    EENT:  EOMI. Anicteric sclerae. MMM  Resp:  CTA bilaterally, no wheezing or rales. No accessory muscle use  CV:  Regular  rhythm,  No edema  GI:  Soft, Non distended, Non tender.  +Bowel sounds  Neurologic:  Alert and oriented X 3, normal speech,   Psych:   Good insight. Not anxious nor agitated  Skin:  No rashes.   No jaundice    Reviewed most current lab test results and cultures YES  Reviewed most current radiology test results   YES  Review and summation of old records today    NO  Reviewed patient's current orders and MAR    YES  PMH/SH reviewed - no change compared to H&P  ________________________________________________________________________  Care Plan discussed with:    Comments   Patient     Family      RN     Care Manager     Consultant                        Multidiciplinary team rounds were held today with , nursing, pharmacist and clinical coordinator. Patient's plan of care was discussed; medications were reviewed and discharge planning was addressed. ________________________________________________________________________  Total NON critical care TIME: 35 Minutes    Total CRITICAL CARE TIME Spent:   Minutes non procedure based      Comments   >50% of visit spent in counseling and coordination of care     ________________________________________________________________________  Joshua Nieves MD     Procedures: see electronic medical records for all procedures/Xrays and details which were not copied into this note but were reviewed prior to creation of Plan. LABS:  I reviewed today's most current labs and imaging studies.   Pertinent labs include:  Recent Labs     07/20/19  0359 07/19/19  1601   WBC 10.0 11.7*   HGB 12.7 15.1   HCT 39.8 46.0    276     Recent Labs     07/21/19  0557 07/20/19  0359 07/19/19  1601    141 137   K 3.8 3.5 3.5   * 105 99   CO2 26 28 31   * 169* 231*   BUN 28* 38* 50*   CREA 1.17* 1.33* 1.74*   CA 8.3* 8.9 9.7   ALB 2.6*  --   --    TBILI 0.6  --   --    SGOT 13*  --   --    ALT 17  --   --        Signed: Joshua Nieves MD

## 2019-07-21 NOTE — CONSULTS
S Cardiology Consult Note    Date of consult:  07/21/19  Date of admission: 7/19/2019  Primary Cardiologist: none  Physician Requesting consult: Dr SABINO Corona Au / Reason for consult: atrial fib / flutter    History of the presenting illness:  Keila Hill is a 80 y.o. was admitted on 7/19 with symptoms of severe dizziness / weakness, falls, unable to stand. She had been feeling unwell the last several days leading up to admission with nausea and vomiting. Thought she had food poisoning. She is diabetic and her sugars were apparently very high. Came to the ER on 7/15 - glucose was 525. She was treated in the ER with improvement in glucose, and was released. Came back on 7/19, and neuroimaging showed a subdural hemorrhage. She has been admitted to the ICU. Overnight she was noted to be in and out of atrial flutter / fibrillation. She recalls having an episode of this quite a number of years ago while admitted for an episode of renal failure. She does not recall ever being on an anticoagulant but thinks she was on aspirin. She has not seen a Cardiologist. She has not had any symptoms of palpitations, chest pain or shortness of breath this admission, and recalls being asymptomatic last time this happened as well. She had a cerebral angiogram yesterday, with no apparently aneurysm. The left ICA was noted to be occluded. She has 50% stenosis of the right ICA ostium. Past Medical History:   Diagnosis Date    ARF (acute renal failure) ([facility] [address]     2/11/13 - had a viral gastroenteritis     Diabetes ([facility] [address]     Duodenal ulcer disease     EGD 2/13 - aspirin stopped afterwards    Hypertension     Leg edema     PAF (paroxysmal atrial fibrillation) (HCC)     1 hour epsiode 2/11/13       Prior to Admission medications    Medication Sig Start Date End Date Taking? Authorizing Provider   ciprofloxacin HCl (CIPRO) 250 mg tablet Take 250 mg by mouth two (2) times a day.     Other, MD Claudy   rosuvastatin (CRESTOR) 40 mg tablet Take 40 mg by mouth nightly. Other, MD Claudy   olmesartan-hydroCHLOROthiazide (BENICAR HCT) 40-25 mg per tablet Take 1 Tab by mouth daily. Other, MD Claudy   ondansetron (ZOFRAN ODT) 4 mg disintegrating tablet Take 1 Tab by mouth every eight (8) hours as needed for Nausea. 7/15/19   Karly Willard,    metoprolol (LOPRESSOR) 25 mg tablet TAKE 1/2 TABLET BY MOUTH TWO (2) TIMES A DAY. 12/30/13   Adi Harrell MD   insulin NPH (NOVOLIN, HUMULIN) 100 unit/mL injection by SubCUTAneous route once. Provider, Historical   insulin lispro (HUMALOG) 100 unit/mL injection by SubCUTAneous route. Provider, Historical   fexofenadine (ALLEGRA) 180 mg tablet Take  by mouth daily. Provider, Historical   OTHER mycardis hct    Provider, Historical   telmisartan-hydrochlorothiazide (MICARDIS HCT) 80-25 mg per tablet Take 1 Tab by mouth daily. 3/19/13   Adi Harrell MD   pantoprazole (PROTONIX) 40 mg tablet Take 1 Tab by mouth daily. 2/13/13   Sandra Simeon MD   atorvastatin (LIPITOR) 40 mg tablet Take 40 mg by mouth daily. Provider, Historical   omega-3 fatty acids-vitamin e (FISH OIL) 1,000 mg cap Take 1 Cap by mouth. Provider, Historical   Calcium-Cholecalciferol, D3, (CALCIUM 600 WITH VITAMIN D3) 600 mg(1,500mg) -400 unit cap Take  by mouth.       Provider, Historical       Family History   Problem Relation Age of Onset    Other Other         patient does not know    Coronary Artery Disease Father        Social History     Socioeconomic History    Marital status:      Spouse name: Not on file    Number of children: Not on file    Years of education: Not on file    Highest education level: Not on file   Occupational History    Not on file   Social Needs    Financial resource strain: Not on file    Food insecurity:     Worry: Not on file     Inability: Not on file    Transportation needs:     Medical: Not on file     Non-medical: Not on file   Tobacco Use    Smoking status: Never Smoker    Smokeless tobacco: Never Used   Substance and Sexual Activity    Alcohol use: No    Drug use: No    Sexual activity: Not on file   Lifestyle    Physical activity:     Days per week: Not on file     Minutes per session: Not on file    Stress: Not on file   Relationships    Social connections:     Talks on phone: Not on file     Gets together: Not on file     Attends Zoroastrian service: Not on file     Active member of club or organization: Not on file     Attends meetings of clubs or organizations: Not on file     Relationship status: Not on file    Intimate partner violence:     Fear of current or ex partner: Not on file     Emotionally abused: Not on file     Physically abused: Not on file     Forced sexual activity: Not on file   Other Topics Concern    Not on file   Social History Narrative    Not on file       Review of Systems   Constitutional: Negative for chills and fever. HENT: Negative for hearing loss and nosebleeds. Eyes: Negative for blurred vision and double vision. Respiratory: Negative for cough, sputum production and shortness of breath. Cardiovascular: Negative for chest pain and palpitations. Gastrointestinal: Positive for nausea and vomiting. Negative for abdominal pain. Genitourinary: Negative for frequency and urgency. Musculoskeletal: Negative for back pain and joint pain. Skin: Negative for itching and rash. Neurological: Positive for dizziness and weakness. Endo/Heme/Allergies: Negative for environmental allergies. Does not bruise/bleed easily. Visit Vitals  /53   Pulse 67   Temp 98.9 °F (37.2 °C)   Resp 16   Wt 90.4 kg (199 lb 4.7 oz)   SpO2 96%   BMI 34.21 kg/m²     Physical Exam   Constitutional: She is oriented to person, place, and time and well-developed, well-nourished, and in no distress. HENT:   Head: Normocephalic and atraumatic. Eyes: Conjunctivae are normal. No scleral icterus. Neck: No JVD present. Cardiovascular: Normal rate, regular rhythm and normal heart sounds. Exam reveals no gallop. No murmur heard. Pulmonary/Chest: Effort normal and breath sounds normal. No stridor. No respiratory distress. She has no wheezes. Abdominal: Soft. She exhibits no distension. Musculoskeletal: Normal range of motion. She exhibits no deformity. Neurological: She is alert and oriented to person, place, and time. Skin: Skin is warm and dry. Psychiatric: Mood and affect normal.       Lab review:  BMP:   Lab Results   Component Value Date/Time     07/21/2019 05:57 AM    K 3.8 07/21/2019 05:57 AM     (H) 07/21/2019 05:57 AM    CO2 26 07/21/2019 05:57 AM    AGAP 6 07/21/2019 05:57 AM     (H) 07/21/2019 05:57 AM    BUN 28 (H) 07/21/2019 05:57 AM    CREA 1.17 (H) 07/21/2019 05:57 AM    GFRAA 56 (L) 07/21/2019 05:57 AM    GFRNA 46 (L) 07/21/2019 05:57 AM        CBC:  Lab Results   Component Value Date/Time    WBC 10.0 07/20/2019 03:59 AM    HGB 12.7 07/20/2019 03:59 AM    HCT 39.8 07/20/2019 03:59 AM    PLATELET 782 34/68/1222 03:59 AM    MCV 93.6 07/20/2019 03:59 AM       All Cardiac Markers in the last 24 hours:  No results found for: CPK, CK, CKMMB, CKMB, RCK3, CKMBT, CKMBPOC, CKNDX, CKND1, EVON, TROPT, TROIQ, JOHN, TROPT, TNIPOC, BNP, BNPP, BNPNT    Data review:  EKG tracing personally reviewed: Atrial fibrillation, rate 108 bpm. NSTW changes. Echocardiogram:  07/19/19   ECHO ADULT COMPLETE 07/21/2019 7/21/2019    Narrative · Left Ventricle: Normal cavity size, wall thickness, systolic function   (ejection fraction normal) and diastolic function. Estimated left   ventricular ejection fraction is 56 - 60%. Visually measured ejection   fraction. · Interatrial Septum: Agitated saline contrast study was performed. · Pulmonary Artery: Moderate pulmonary hypertension. · Interatrial Septum: Agitated saline contrast study was performed. · Mitral Valve: Trace mitral valve regurgitation.         Signed by: Christina Boudreaux MD     Other imaging:  CT head 7/19/19  IMPRESSION:   Acute subarachnoid hemorrhage in the left sylvian fissure and basal cisterns. No  evidence for acute infarct. Assessment:    1. Acute subarachnoid hemorrhage  2. Bilateral carotid artery disease  3. Paroxysmal atrial fibrillation  / flutter  -Currently in NSR. Asymptomatic when in fibrillation and rate was not particularly fast.  4. Type II diabetes on insulin, poorly controlled    Recommendations / Plan:    Difficult situation  Anticoagulation is currently contraindicated in the setting of acute spontaneous bleed. She does have an increased CHADS-VASc score,  but the risk of anticoagulation related bleeding is certainly higher in someone who had a spontaneous subarachnoid hemorrhage    Would like to see recommendations from Neurosurgery and Neurology about when / if it would be a candidate for Eliquis. For now I recommend continued beta blocker therapy (she was on metoprolol PTA) and statin for her vascular disease. Signed:  Renetta Fink Nubli  Interventional Cardiology  07/21/19

## 2019-07-21 NOTE — PROGRESS NOTES
0730: Bedside and Verbal shift change report given to Jair Guevara RN (oncoming nurse) by Erasmo Castro RN (offgoing nurse). Report included the following information SBAR, Kardex, ED Summary, OR Summary, Procedure Summary, Intake/Output, MAR, Accordion, Recent Results, Med Rec Status and Cardiac Rhythm NSR.     0800: Assessment complete, VSS, neuro intact. A&Ox4, moving all extremities purposefully, turns self, equal  and dorsi/plantar flexion. PERRLA. Minor headache noted and treated with prn tylenol. 0830: cardiology consult called to [facility], paged returned by Dr. Renny Carrington to see patient.      1200: Reassessment, no changes

## 2019-07-21 NOTE — PROGRESS NOTES
Spiritual Care Assessment/Progress Note  [facility]      NAME: Kristy Payne      MRN: [id number]  AGE: 80 y.o. SEX: female  [employer] Affiliation: No Temple   Language: English     7/21/2019           Spiritual Assessment begun in [facility] 7S2 INTENSIVE CARE through conversation with:         [x]Patient        [] Family    [] Friend(s)        Reason for Consult: Initial/Spiritual assessment, critical care     Spiritual beliefs: (Please include comment if needed)     [x] Identifies with a sunil tradition: Bahai        [] Supported by a sunil community:            [] Claims no spiritual orientation:           [] Seeking spiritual identity:                [] Adheres to an individual form of spirituality:           [] Not able to assess:                           Identified resources for coping:      [x] Prayer                               [] Music                  [] Guided Imagery     [x] Family/friends                 [] Pet visits     [] Devotional reading                         [] Unknown     [] Other:                                               Interventions offered during this visit: (See comments for more details)    Patient Interventions: Affirmation of emotions/emotional suffering, Catharsis/review of pertinent events in supportive environment, Iconic (affirming the presence of God/Higher Power), Initial/Spiritual assessment, Critical care, Prayer (actual), Prayer (assurance of)     Family/Friend(s):  Affirmation of emotions/emotional suffering, Iconic (affirming the presence of God/Higher Power), Initial Assessment, Prayer (actual), Prayer (assurance of)     Plan of Care:     [x] Support spiritual and/or cultural needs    [] Support AMD and/or advance care planning process      [] Support grieving process   [] Coordinate Rites and/or Rituals    [] Coordination with community clergy   [] No spiritual needs identified at this time   [] Detailed Plan of Care below (See Comments)  [] Make referral to Music Therapy  [] Make referral to Pet Therapy     [] Make referral to Addiction services  [] Make referral to [facility]  [] Make referral to Spiritual Care Partner  [] No future visits requested        [x] Follow up visits as needed     Comments: Visited Mrs Catie Marie in [address] [place] [address] for initial spiritual assessment. Patient was resting quietly in bed and her  and son were at her bedside; patient and family appeared to be in good spirits. Provided active listening as Mrs Catie Marie shared that, other than feeling weak, she was doing pretty good. She expressed great appreciation of all that the [facility] staff had been doing for her and her family. Ms Catie Marie stated that she was [facility] and she would like  to have prayer for her healing. Had prayer as requested and assured patient and family of ongoing  availability for support. : Rev. Johnny Mrainelli.  [facility], to contact [phone] [address] call: [phone]

## 2019-07-21 NOTE — PROGRESS NOTES
Problem: Falls - Risk of  Goal: *Absence of Falls  Description  Document Stiven Johnson Fall Risk and appropriate interventions in the flowsheet.   Outcome: Progressing Towards Goal  Note:   Fall Risk Interventions:            Medication Interventions: Evaluate medications/consider consulting pharmacy    Elimination Interventions: Bed/chair exit alarm    History of Falls Interventions: Consult care management for discharge planning

## 2019-07-21 NOTE — PROGRESS NOTES
Problem: Mobility Impaired (Adult and Pediatric)  Goal: *Acute Goals and Plan of Care (Insert Text)  Description  Physical Therapy Goals  Initiated 7/21/2019  1. Patient will move from supine to sit and sit to supine  and scoot up and down in bed with independence within 7 day(s). 2.  Patient will transfer from bed to chair and chair to bed with independence using the least restrictive device within 7 day(s). 3.  Patient will perform sit to stand with independence within 7 day(s). 4.  Patient will ambulate with independence for 300 feet with the least restrictive device within 7 day(s). 5.  Patient will ascend/descend 14 stairs with handrail(s) with supervision/set-up within 7 day(s). 6.  Patient will improve Johnson Balance score by 7 points within 7 days. Outcome: Progressing Towards Goal     PHYSICAL THERAPY EVALUATION- NEURO POPULATION    Patient: Jimbo Martinez (30 y.o. female)  Date: 7/21/2019  Primary Diagnosis: ICH (intracerebral hemorrhage) (HCC) [I61.9]        Precautions: SBP <140       ASSESSMENT :  Based on the objective data described below, the patient presents with impaired functional mobility as compared to baseline level 2* generalized weakness, fear of falling, anxiety, and minor balance impairments following admission for acute SAH. Prior to this admission, pt reports that she lived at home with her spouse and was indep with all ADLs and mobility until this past Thursday when she developed R sided weakness and has since been utilizing [facility] and requiring assistance from her spouse. Today, she was able to mobilize to EOB with up to min A and increased time. Demonstrates good sitting balance once hips squared at EOB. With encouragement, she was able to complete sit<>stands x2 reps and progress short stance amb in room with up to min A without use of AD. Appears very cautious, mildly unsteady, and very fearful of falling however no significant LOB or ataxia observed in short distance. Assisted back to bed at end of session per pt request - agreeable to OOB to recliner later this PM with nursing staff. Provided education on importance of OOB to reduce risk of functional decline. Anticipate that she will progress well and be appropriate for discharge home once medically cleared. Will continue to follow for higher level balance and gait assessment and progression. Patient will benefit from skilled intervention to address the above impairments. Patients rehabilitation potential is considered to be Good  Factors which may influence rehabilitation potential include:   ? None noted  ? Mental ability/status  ? Medical condition  ? Home/family situation and support systems  ? Safety awareness  ? Pain tolerance/management  ? Other:      PLAN :  Recommendations and Planned Interventions:  ?             Bed Mobility Training             ? Neuromuscular Re-Education  ? Transfer Training                   ? Orthotic/Prosthetic Training  ? Gait Training                         ? Modalities  ? Therapeutic Exercises           ? Edema Management/Control  ? Therapeutic Activities            ? Patient and Family Training/Education  ? Other (comment):  Frequency/Duration: Patient will be followed by physical therapy 5 times a week to address goals. Discharge Recommendations: To Be Determined and None  Further Equipment Recommendations for Discharge: None       SUBJECTIVE:   Patient stated I don't know if I can stand, I'm so scared of falling agin.     OBJECTIVE DATA SUMMARY:   HISTORY:    Past Medical History:   Diagnosis Date    ARF (acute renal failure) ([facility]     2/11/13 - had a viral gastroenteritis     Diabetes ([facility]     Duodenal ulcer disease     EGD 2/13 - aspirin stopped afterwards    Hypertension     Leg edema     PAF (paroxysmal atrial fibrillation) ([facility]     1 hour epsiode 2/11/13     Past Surgical History:   Procedure Laterality Date    HX OTHER SURGICAL      Echo 2/11/13 - mild LVH, EF 60%     Prior Level of Function/Home Situation: lives at home with spouse; indep without AD typically  Personal factors and/or comorbidities impacting plan of care: PMHx    Home Situation  Home Environment: Private residence  # Steps to Enter: 4  Rails to Enter: Yes  One/Two Story Residence: Two story  # of Interior Steps: 14  Living Alone: No  Support Systems: Spouse/Significant Other/Partner  Patient Expects to be Discharged to[de-identified] Private residence  Current DME Used/Available at Home: Cane, straight    EXAMINATION/PRESENTATION/DECISION MAKING:   Critical Behavior:  Neurologic State: Alert  Orientation Level: Oriented X4  Cognition: Appropriate decision making, Follows commands  Safety/Judgement: Awareness of environment  Hearing:     Skin:    Edema:   Range Of Motion:  AROM: Within functional limits  PROM: Within functional limits     Strength:    Strength: Within functional limits        Tone & Sensation:   Tone: Normal     Sensation: Intact        Coordination:  Coordination: Within functional limits  Vision:   Tracking: Able to track stimulus in all quadrants w/o difficulty  Diplopia: No  Functional Mobility:  Bed Mobility:  Supine to Sit: Minimum assistance  Sit to Supine: Contact guard assistance     Transfers:  Sit to Stand: Contact guard assistance  Stand to Sit: Contact guard assistance     Balance:   Sitting: Intact  Standing: Impaired; Without support  Standing - Static: Good  Standing - Dynamic : Fair  Ambulation/Gait Training:  Distance (ft): 10 Feet (ft)  Assistive Device: Gait belt  Ambulation - Level of Assistance: Contact guard assistance;Minimal assistance  Gait Description (WDL): Exceptions to WDL  Gait Abnormalities: Trunk sway increased  Base of Support: Widened  Speed/Maira: Slow  Step Length: Right shortened;Left shortened Functional Measure  Johnson Balance Test:    Sitting to Standin  Standing Unsupported: 1  Sitting with Back Unsupported: 3  Standing to Sittin  Transfers: 1  Standing Unsupported with Eyes Closed: 2  Standing Unsupported with Feet Together: 0  Reach Forward with Outstretched Arm: 0   Object: 0  Turn to Look Over Shoulders: 1  Turn 360 Degrees: 1  Alternate Foot on Step/Stool: 0  Standing Unsupported One Foot in Front: 0  Stand on One Le  Total: 12         56=Maximum possible score;   0-20=High fall risk  21-40=Moderate fall risk   41-56=Low fall risk       Physical Therapy Evaluation Charge Determination   History Examination Presentation Decision-Making   MEDIUM  Complexity : 1-2 comorbidities / personal factors will impact the outcome/ POC  MEDIUM Complexity : 3 Standardized tests and measures addressing body structure, function, activity limitation and / or participation in recreation  LOW Complexity : Stable, uncomplicated  HIGH Complexity : FOTO score of 1- 25       Based on the above components, the patient evaluation is determined to be of the following complexity level: LOW     Therapeutic Exercises:     Pain:  Pain Scale 1: Numeric (0 - 10)  Pain Intensity 1: 0              Activity Tolerance:   VSS. SBP remained <140     Please refer to the flowsheet for vital signs taken during this treatment. After treatment:   ?     Patient left in no apparent distress sitting up in chair  ? Patient left in no apparent distress in bed  ? Call bell left within reach  ? Nursing notified  ? Caregiver present  ? Bed alarm activated    COMMUNICATION/EDUCATION:   The patients plan of care was discussed with: Registered Nurse. Patient was educated regarding her deficit(s) of improved R sided weakness as this relates to her diagnosis of SAH. She demonstrated Good understanding as evidenced by nodding.     Patient and/or family was verbally educated on the BE FAST acronym for signs/symptoms of CVA and TIA. BE FAST was written on patient's communication board  for visual education and reinforcement. All questions answered with patient indicating good understanding. ?  Fall prevention education was provided and the patient/caregiver indicated understanding. ? Patient/family have participated as able in goal setting and plan of care. ?  Patient/family agree to work toward stated goals and plan of care. ?  Patient understands intent and goals of therapy, but is neutral about his/her participation. ? Patient is unable to participate in goal setting and plan of care.     Thank you for this referral.  Romie Todd, PT, DPT   Time Calculation: 25 mins

## 2019-07-21 NOTE — PROGRESS NOTES
Patient seen briefly. Doing well and no complaints. Vitals stable and on room air. Angio without clear aneurysm. Cr down to 1.1. Has SCDs. For follow up CT tomorrow per NS. Will follow as needed.

## 2019-07-21 NOTE — CDMP QUERY
#1    Pt admitted with SAH/Pt noted to have elevated A1c.    If possible, please document in the progress notes and d/c summary if you are evaluating and / or treating any of the following:     Diabetes with Hyperglycemia   Hyperglycemia not related to Diabetes   Other, please specify   Clinically unable to determine    The medical record reflects the following:    Risk Factors: DM    Clinical Indicators:   Glucose: 231 (H)  Hemoglobin A1c, (calculated): 8.9 (H)  Est. average glucose: 209      Treatment: glucose monitoring, sliding scale lispro insulin      Thank you,         [facility], [address] [facility]

## 2019-07-22 ENCOUNTER — APPOINTMENT (OUTPATIENT)
Dept: CT IMAGING | Age: 69
DRG: 065 | End: 2019-07-22
Attending: NURSE PRACTITIONER
Payer: COMMERCIAL

## 2019-07-22 ENCOUNTER — APPOINTMENT (OUTPATIENT)
Dept: VASCULAR SURGERY | Age: 69
DRG: 065 | End: 2019-07-22
Attending: NURSE PRACTITIONER
Payer: COMMERCIAL

## 2019-07-22 LAB
ALBUMIN SERPL-MCNC: 3 G/DL (ref 3.5–5)
ALBUMIN/GLOB SERPL: 0.8 {RATIO} (ref 1.1–2.2)
ALP SERPL-CCNC: 74 U/L (ref 45–117)
ALT SERPL-CCNC: 20 U/L (ref 12–78)
ANION GAP SERPL CALC-SCNC: 9 MMOL/L (ref 5–15)
AST SERPL-CCNC: 13 U/L (ref 15–37)
ATRIAL RATE: 312 BPM
BASILAR ARTERY EDV: 55 CM/S
BASILAR ARTERY MEAN VEL: 79 CM/S
BASILAR ARTERY PSV: 126 CM/S
BILIRUB SERPL-MCNC: 0.8 MG/DL (ref 0.2–1)
BUN SERPL-MCNC: 20 MG/DL (ref 6–20)
BUN/CREAT SERPL: 19 (ref 12–20)
CALCIUM SERPL-MCNC: 8.9 MG/DL (ref 8.5–10.1)
CALCULATED R AXIS, ECG10: 9 DEGREES
CALCULATED T AXIS, ECG11: -67 DEGREES
CHLORIDE SERPL-SCNC: 109 MMOL/L (ref 97–108)
CO2 SERPL-SCNC: 23 MMOL/L (ref 21–32)
CREAT SERPL-MCNC: 1.03 MG/DL (ref 0.55–1.02)
DIAGNOSIS, 93000: NORMAL
GLOBULIN SER CALC-MCNC: 3.8 G/DL (ref 2–4)
GLUCOSE BLD STRIP.AUTO-MCNC: 186 MG/DL (ref 65–100)
GLUCOSE BLD STRIP.AUTO-MCNC: 187 MG/DL (ref 65–100)
GLUCOSE BLD STRIP.AUTO-MCNC: 215 MG/DL (ref 65–100)
GLUCOSE BLD STRIP.AUTO-MCNC: 221 MG/DL (ref 65–100)
GLUCOSE SERPL-MCNC: 183 MG/DL (ref 65–100)
LEFT ACA EDV: 18 CM/S
LEFT ACA MEAN VEL: 27 CM/S
LEFT ACA PSV: 46 CM/S
LEFT MCA 1 EDV: 42 CM/S
LEFT MCA 1 MEAN VEL: 56 CM/S
LEFT MCA 1 PSV: 84 CM/S
LEFT PCA 1 EDV: 50 CM/S
LEFT PCA 1 MEAN VEL: 73 CM/S
LEFT PCA 1 PSV: 118 CM/S
LEFT VERTEBRAL EDV TCD: 45 CM/S
LEFT VERTEBRAL MEAN VEL: 70 CM/S
LEFT VERTEBRAL PSV TCD: 119 CM/S
POTASSIUM SERPL-SCNC: 3.6 MMOL/L (ref 3.5–5.1)
PROT SERPL-MCNC: 6.8 G/DL (ref 6.4–8.2)
Q-T INTERVAL, ECG07: 306 MS
QRS DURATION, ECG06: 84 MS
QTC CALCULATION (BEZET), ECG08: 425 MS
RIGHT ACA EDV: 97 CM/S
RIGHT ACA LINDEGAARD RATIO: 4.3
RIGHT ACA MEAN VEL: 139 CM/S
RIGHT ACA PSV: 224 CM/S
RIGHT EX ICA EDV: 18 CM/S
RIGHT EX ICA MEAN VEL: 32 CM/S
RIGHT EX ICA PSV: 61 CM/S
RIGHT ICA EDV: 33 CM/S
RIGHT ICA MEAN VEL: 60 CM/S
RIGHT ICA PSV: 114 CM/S
RIGHT LINDEGAARD RATIO: 2.1
RIGHT MCA 1 EDV: 41 CM/S
RIGHT MCA 1 MEAN VEL: 67 CM/S
RIGHT MCA 1 PSV: 119 CM/S
RIGHT PCA 1 EDV: 33 CM/S
RIGHT PCA 1 MEAN VEL: 61 CM/S
RIGHT PCA 1 PSV: 116 CM/S
SERVICE CMNT-IMP: ABNORMAL
SODIUM SERPL-SCNC: 141 MMOL/L (ref 136–145)
VENTRICULAR RATE, ECG03: 116 BPM

## 2019-07-22 PROCEDURE — 74011250636 HC RX REV CODE- 250/636: Performed by: NURSE PRACTITIONER

## 2019-07-22 PROCEDURE — 92610 EVALUATE SWALLOWING FUNCTION: CPT | Performed by: SPEECH-LANGUAGE PATHOLOGIST

## 2019-07-22 PROCEDURE — 74011636637 HC RX REV CODE- 636/637: Performed by: FAMILY MEDICINE

## 2019-07-22 PROCEDURE — 74011636637 HC RX REV CODE- 636/637: Performed by: INTERNAL MEDICINE

## 2019-07-22 PROCEDURE — 93886 INTRACRANIAL COMPLETE STUDY: CPT

## 2019-07-22 PROCEDURE — 36415 COLL VENOUS BLD VENIPUNCTURE: CPT

## 2019-07-22 PROCEDURE — 74011000258 HC RX REV CODE- 258: Performed by: FAMILY MEDICINE

## 2019-07-22 PROCEDURE — 70450 CT HEAD/BRAIN W/O DYE: CPT

## 2019-07-22 PROCEDURE — 74011250636 HC RX REV CODE- 250/636: Performed by: RADIOLOGY

## 2019-07-22 PROCEDURE — 97116 GAIT TRAINING THERAPY: CPT

## 2019-07-22 PROCEDURE — 65610000006 HC RM INTENSIVE CARE

## 2019-07-22 PROCEDURE — 74011250637 HC RX REV CODE- 250/637: Performed by: INTERNAL MEDICINE

## 2019-07-22 PROCEDURE — 80053 COMPREHEN METABOLIC PANEL: CPT

## 2019-07-22 PROCEDURE — 74011250637 HC RX REV CODE- 250/637: Performed by: NURSE PRACTITIONER

## 2019-07-22 PROCEDURE — 74011250637 HC RX REV CODE- 250/637: Performed by: FAMILY MEDICINE

## 2019-07-22 PROCEDURE — 74011000250 HC RX REV CODE- 250: Performed by: NURSE PRACTITIONER

## 2019-07-22 PROCEDURE — 82962 GLUCOSE BLOOD TEST: CPT

## 2019-07-22 PROCEDURE — 74011250636 HC RX REV CODE- 250/636: Performed by: FAMILY MEDICINE

## 2019-07-22 RX ORDER — INSULIN GLARGINE 100 [IU]/ML
14 INJECTION, SOLUTION SUBCUTANEOUS 2 TIMES DAILY
Status: DISCONTINUED | OUTPATIENT
Start: 2019-07-22 | End: 2019-07-23

## 2019-07-22 RX ORDER — LEVETIRACETAM 500 MG/1
500 TABLET ORAL EVERY 12 HOURS
Status: DISCONTINUED | OUTPATIENT
Start: 2019-07-22 | End: 2019-07-29 | Stop reason: HOSPADM

## 2019-07-22 RX ORDER — SODIUM CHLORIDE, SODIUM LACTATE, POTASSIUM CHLORIDE, CALCIUM CHLORIDE 600; 310; 30; 20 MG/100ML; MG/100ML; MG/100ML; MG/100ML
100 INJECTION, SOLUTION INTRAVENOUS CONTINUOUS
Status: DISCONTINUED | OUTPATIENT
Start: 2019-07-22 | End: 2019-07-29 | Stop reason: HOSPADM

## 2019-07-22 RX ORDER — INSULIN LISPRO 100 [IU]/ML
INJECTION, SOLUTION INTRAVENOUS; SUBCUTANEOUS
Status: DISCONTINUED | OUTPATIENT
Start: 2019-07-22 | End: 2019-07-29 | Stop reason: HOSPADM

## 2019-07-22 RX ORDER — LABETALOL HCL 20 MG/4 ML
20 SYRINGE (ML) INTRAVENOUS
Status: DISCONTINUED | OUTPATIENT
Start: 2019-07-22 | End: 2019-07-23

## 2019-07-22 RX ORDER — METOPROLOL TARTRATE 5 MG/5ML
5 INJECTION INTRAVENOUS
Status: DISCONTINUED | OUTPATIENT
Start: 2019-07-22 | End: 2019-07-29 | Stop reason: HOSPADM

## 2019-07-22 RX ORDER — METOPROLOL TARTRATE 25 MG/1
12.5 TABLET, FILM COATED ORAL 2 TIMES DAILY
Status: DISCONTINUED | OUTPATIENT
Start: 2019-07-22 | End: 2019-07-23

## 2019-07-22 RX ORDER — PANTOPRAZOLE SODIUM 40 MG/1
40 TABLET, DELAYED RELEASE ORAL
Status: DISCONTINUED | OUTPATIENT
Start: 2019-07-23 | End: 2019-07-29 | Stop reason: HOSPADM

## 2019-07-22 RX ADMIN — NIMODIPINE 60 MG: 30 CAPSULE, LIQUID FILLED ORAL at 08:37

## 2019-07-22 RX ADMIN — NIMODIPINE 60 MG: 30 CAPSULE, LIQUID FILLED ORAL at 00:44

## 2019-07-22 RX ADMIN — NIMODIPINE 60 MG: 30 CAPSULE, LIQUID FILLED ORAL at 04:21

## 2019-07-22 RX ADMIN — METOPROLOL TARTRATE 5 MG: 5 INJECTION INTRAVENOUS at 23:13

## 2019-07-22 RX ADMIN — INSULIN GLARGINE 14 UNITS: 100 INJECTION, SOLUTION SUBCUTANEOUS at 22:46

## 2019-07-22 RX ADMIN — ONDANSETRON 4 MG: 2 INJECTION INTRAMUSCULAR; INTRAVENOUS at 02:07

## 2019-07-22 RX ADMIN — DOCUSATE SODIUM 100 MG: 100 CAPSULE, LIQUID FILLED ORAL at 08:37

## 2019-07-22 RX ADMIN — LEVETIRACETAM 500 MG: 500 TABLET, FILM COATED ORAL at 20:40

## 2019-07-22 RX ADMIN — ONDANSETRON 4 MG: 2 INJECTION INTRAMUSCULAR; INTRAVENOUS at 09:14

## 2019-07-22 RX ADMIN — SODIUM CHLORIDE 500 ML: 900 INJECTION, SOLUTION INTRAVENOUS at 09:58

## 2019-07-22 RX ADMIN — INSULIN LISPRO 2 UNITS: 100 INJECTION, SOLUTION INTRAVENOUS; SUBCUTANEOUS at 00:51

## 2019-07-22 RX ADMIN — ACETAMINOPHEN 650 MG: 325 TABLET ORAL at 09:14

## 2019-07-22 RX ADMIN — INSULIN LISPRO 5 UNITS: 100 INJECTION, SOLUTION INTRAVENOUS; SUBCUTANEOUS at 11:30

## 2019-07-22 RX ADMIN — METOPROLOL TARTRATE 5 MG: 5 INJECTION INTRAVENOUS at 15:05

## 2019-07-22 RX ADMIN — LABETALOL 20 MG/4 ML (5 MG/ML) INTRAVENOUS SYRINGE 20 MG: at 05:12

## 2019-07-22 RX ADMIN — METOPROLOL TARTRATE 12.5 MG: 25 TABLET ORAL at 11:00

## 2019-07-22 RX ADMIN — INSULIN LISPRO 5 UNITS: 100 INJECTION, SOLUTION INTRAVENOUS; SUBCUTANEOUS at 06:38

## 2019-07-22 RX ADMIN — INSULIN LISPRO 3 UNITS: 100 INJECTION, SOLUTION INTRAVENOUS; SUBCUTANEOUS at 16:37

## 2019-07-22 RX ADMIN — NIMODIPINE 60 MG: 30 CAPSULE, LIQUID FILLED ORAL at 16:29

## 2019-07-22 RX ADMIN — SODIUM CHLORIDE, SODIUM LACTATE, POTASSIUM CHLORIDE, AND CALCIUM CHLORIDE 125 ML/HR: 600; 310; 30; 20 INJECTION, SOLUTION INTRAVENOUS at 09:58

## 2019-07-22 RX ADMIN — INSULIN LISPRO 2 UNITS: 100 INJECTION, SOLUTION INTRAVENOUS; SUBCUTANEOUS at 22:56

## 2019-07-22 RX ADMIN — SODIUM CHLORIDE, SODIUM LACTATE, POTASSIUM CHLORIDE, AND CALCIUM CHLORIDE 125 ML/HR: 600; 310; 30; 20 INJECTION, SOLUTION INTRAVENOUS at 22:00

## 2019-07-22 RX ADMIN — SODIUM CHLORIDE 500 MG: 900 INJECTION, SOLUTION INTRAVENOUS at 08:37

## 2019-07-22 RX ADMIN — NIMODIPINE 60 MG: 30 CAPSULE, LIQUID FILLED ORAL at 20:00

## 2019-07-22 RX ADMIN — INSULIN LISPRO 5 UNITS: 100 INJECTION, SOLUTION INTRAVENOUS; SUBCUTANEOUS at 06:37

## 2019-07-22 RX ADMIN — NIMODIPINE 60 MG: 30 CAPSULE, LIQUID FILLED ORAL at 12:00

## 2019-07-22 RX ADMIN — INSULIN GLARGINE 14 UNITS: 100 INJECTION, SOLUTION SUBCUTANEOUS at 08:37

## 2019-07-22 RX ADMIN — ONDANSETRON 4 MG: 2 INJECTION INTRAMUSCULAR; INTRAVENOUS at 18:42

## 2019-07-22 RX ADMIN — HYDRALAZINE HYDROCHLORIDE 10 MG: 20 INJECTION INTRAMUSCULAR; INTRAVENOUS at 00:51

## 2019-07-22 RX ADMIN — INSULIN LISPRO 5 UNITS: 100 INJECTION, SOLUTION INTRAVENOUS; SUBCUTANEOUS at 16:38

## 2019-07-22 RX ADMIN — SODIUM CHLORIDE 125 ML/HR: 900 INJECTION, SOLUTION INTRAVENOUS at 00:44

## 2019-07-22 NOTE — DIABETES MGMT
[facility]    DTC Progress Note    Recommendations/ Comments: Chart reviewed due to hyperglycemia. BG consistently above 200 mg/dl x 24 hours.  mg/dl this am. Noted Lantus has been increased to 14 units and AC lispro, 5 units TID has been added. Pt required 16 units of correction yesterday. Will continue to follow. Current hospital DM medication: Lantus, 14 units daily  AC lispro, 5 units TID  Lispro, normal sensitivity correction scale     Chart reviewed on [facility]. Patient is a 80 y.o. female with known DM on NPH and Lispro sliding scale at home. Dose unknown. A1c:   Lab Results   Component Value Date/Time    Hemoglobin A1c 8.9 (H) 07/21/2019 08:00 AM    Hemoglobin A1c 8.2 (H) 02/10/2013 03:19 AM       Recent Glucose Results:   Lab Results   Component Value Date/Time     (H) 07/22/2019 04:29 AM    GLUCPOC 186 (H) 07/22/2019 06:25 AM    GLUCPOC 187 (H) 07/22/2019 12:47 AM    GLUCPOC 208 (H) 07/21/2019 05:45 PM        Lab Results   Component Value Date/Time    Creatinine 1.03 (H) 07/22/2019 04:29 AM     Estimated Creatinine Clearance: 59.5 mL/min (A) (based on SCr of 1.03 mg/dL (H)). Active Orders   Diet    DIET DIABETIC CONSISTENT CARB Regular        PO intake:   Patient Vitals for the past 72 hrs:   % Diet Eaten   07/21/19 1000 75 %   07/20/19 1900 100 %       Will continue to follow as needed.     Thank you  Janet Camacho RD, Diabetes Clinician       Time spent: 3 minutes

## 2019-07-22 NOTE — PROGRESS NOTES
Speech Pathology bedside swallow evaluation/discharge  Patient: Keila Hill (79 y.o. female)  Date: 7/22/2019  Primary Diagnosis: ICH (intracerebral hemorrhage) ([facility] [I61.9]       Precautions: fall       ASSESSMENT :  Based on the objective data described below, the patient presents with no oral or pharyngeal dysphagia. Timely and complete mastication, timely swallow initiation and functional hyolaryngeal elevation/excursion via palpation. No s/s of aspiration observed. Patient and son bedside both report no concerns regarding speech or language function with patient carrying on a fluent and appropriate conversation. .  Skilled acute therapy provided by a speech-language pathologist is not indicated at this time. PLAN :  Recommendations:  --regular diet. No further SLP needs at this time. Discharge Recommendations: None     SUBJECTIVE:   Patient stated I feel like I sound like my normal self, don't you? (asking her son).     OBJECTIVE:     Past Medical History:   Diagnosis Date    ARF (acute renal failure) ([facility] [facility]     2/11/13 - had a viral gastroenteritis     Diabetes ([facility]     Duodenal ulcer disease     EGD 2/13 - aspirin stopped afterwards    Hypertension     Leg edema     [facility]ox[facility] ([facility])     1 hour epsiode 2/11/13     Past Surgical History:   Procedure Laterality Date    HX OTHER SURGICAL      Echo 2/11/13 - mild LVH, EF 60%     Prior Level of Function/Home Situation:   Home Situation  Home Environment: Private residence  # Steps to Enter: 4  Rails to Enter: Yes  One/Two Story Residence: Two story  # of Interior Steps: 14  Living Alone: No  Support Systems: Spouse/Significant Other/Partner  Patient Expects to be Discharged to[de-identified] Private residence  Current DME Used/Available at Home: Cane, straight  Diet prior to admission: regular  Current Diet:  Regular    Cognitive and Communication Status:  Neurologic State: Alert, Appropriate for age  Orientation Level: Oriented X4  Cognition: Appropriate decision making, Appropriate for age attention/concentration, Appropriate safety awareness  Perception: Appears intact  Perseveration: No perseveration noted  Safety/Judgement: Awareness of environment, Insight into deficits  Oral Assessment:  Oral Assessment  Labial: No impairment  Dentition: Edentulous; Upper & lower dentures(does not have dentures in place )  Oral Hygiene: moist mucosa   Lingual: No impairment  Mandible: No impairment  P.O. Trials:  Patient Position: upright in chair   Vocal quality prior to P.O.: No impairment  Consistency Presented: Solid; Thin liquid  How Presented: Self-fed/presented;Straw;Successive swallows     Bolus Acceptance: No impairment  Bolus Formation/Control: No impairment     Propulsion: No impairment  Oral Residue: None  Initiation of Swallow: No impairment  Laryngeal Elevation: Functional  Aspiration Signs/Symptoms: None  Pharyngeal Phase Characteristics: No impairment, issues, or problems              Oral Phase Severity: No impairment  Pharyngeal Phase Severity : No impairment  NOMS:   The NOMS functional outcome measure was used to quantify this patient's level of swallowing impairment. Based on the NOMS, the patient was determined to be at level 7 for swallow function     NOMS Swallowing Levels:  Level 1 (CN): NPO  Level 2 (CM): NPO but takes consistency in therapy  Level 3 (CL): Takes less than 50% of nutrition p.o. and continues with nonoral feedings; and/or safe with mod cues; and/or max diet restriction  Level 4 (CK): Safe swallow but needs mod cues; and/or mod diet restriction; and/or still requires some nonoral feeding/supplements  Level 5 (CJ): Safe swallow with min diet restriction; and/or needs min cues  Level 6 (CI): Independent with p.o.; rare cues; usually self cues; may need to avoid some foods or needs extra time  Level 7 ([address] [place] [address]): Independent for all p.o.  MARLEY. (2003).  National Outcomes Measurement System (NOMS): Adult Speech-Language Pathology User's Guide. Pain:  Pain Scale 1: Numeric (0 - 10)  Pain Intensity 1: 0     After treatment:   [x] Patient left in no apparent distress sitting up in chair  [] Patient left in no apparent distress in bed  [x] Call bell left within reach  [x] Nursing notified  [x] Caregiver present  [] Bed alarm activated    COMMUNICATION/EDUCATION:   The patients plan of care including findings, recommendations, and recommended diet changes were discussed with: Registered Nurse. Patient was educated regarding Her functional swallow as this relates to Her diagnosis of ICH. She demonstrated Good understanding as evidenced by verbalization of understanding. [x] Patient/family have participated as able and agree with findings and recommendations. [] Patient is unable to participate in plan of care at this time. Thank you for this referral.  Jose Chamberlain M.CD.  CCC-SLP   Time Calculation: 12 mins

## 2019-07-22 NOTE — PROGRESS NOTES
Clinical Pharmacy Note: IV to PO Automatic Conversion  Please note: Carolee ARGUELLO Adam medication(s) (levetiracetam) has/have been changed from IV to PO based on the following critiera:    - Patient is taking scheduled oral medications  - Patient is tolerating tube feeds at goal rate or a full liquid, soft or regular diet    This IV to PO conversion is based on the P&[employer] approved automatic conversion policy for eligible patients. Please call with questions.

## 2019-07-22 NOTE — PROGRESS NOTES
Hospitalist Progress Note    NAME: oJe Louis   :  1950   MRN:  [id number]       Assessment / Plan:  1.  Acute subarachnoid hemorrhage  -S/P cerebral angiogram, no obvious aneurysm   - Optimize BP control, currently on cardene gtt, keep SBP less than 140  - Continue Nimodipine  - continue keppra   - PT eval pending  - CT head with \" Stable small to moderate volume subarachnoid hemorrhage \"  - neurochecks, seizure precaution  - appreciate NS and BERRY input  CT shows stable SAH   2.  Acute kidney injury,stable   - likely prerenal due to recent GI loss  - improving  - continue IV hydration  - avoid nephrotoxic meds     3.  Diabetes mellitus type 2,with hyperglycemia   - better controlled  - continue SSNI, accuchecks, monitor   Resume lower dose of Lantus, she is eating 50% of normal intake  Increased Lantus         4.  Accelerated hypertension.  -tight BP control to keep SBP less than 140  - on Cardene gtt off   - monitor   - 5. Afib: BB no anticoagulation,   Code status: full  DVT prophylaxis: SCD     Care Plan discussed with: Patient/Family  Disposition: Home w/Family  Subjective:     Chief Complaint / Reason for Physician Visit  \" swelling in hands \". Discussed with RN events overnight. Review of Systems:  Symptom Y/N Comments  Symptom Y/N Comments   Fever/Chills    Chest Pain     Poor Appetite    Edema     Cough    Abdominal Pain     Sputum    Joint Pain     SOB/JAMES    Pruritis/Rash     Nausea/vomit    Tolerating PT/OT     Diarrhea    Tolerating Diet     Constipation    Other       Could NOT obtain due to:      Objective:     VITALS:   Last 24hrs VS reviewed since prior progress note.  Most recent are:  Patient Vitals for the past 24 hrs:   Temp Pulse Resp BP SpO2   19 1045  68 20 124/42 96 %   19 1030  74 20 (!) 172/139 96 %   19 1015  73 25 (!) 125/93 95 %   19 1000   24 122/49 95 %   19 0830  71 13 140/79 98 %   19 0815  70 13 140/67 98 %   19 0800 98.7 °F (37.1 °C) 79 13 151/74 99 %   07/22/19 0700  67 15 131/61 98 %   07/22/19 0600  68 15 125/64 97 %   07/22/19 0530  97  114/77    07/22/19 0510  (!) 150 19  95 %   07/22/19 0500  (!) 122 20 121/83 95 %   07/22/19 0415 98.2 °F (36.8 °C) (!) 114 17 132/81 94 %   07/22/19 0300  (!) 123 15 135/70 92 %   07/22/19 0200  87 17 157/64 95 %   07/22/19 0100  (!) 106 17 133/77 94 %   07/22/19 0000 98.2 °F (36.8 °C) 63 15 142/89 95 %   07/21/19 2300  73 15 137/57 94 %   07/21/19 2200  83 22 139/87 98 %   07/21/19 2100  80 19 (!) 128/95 99 %   07/21/19 2000 99.2 °F (37.3 °C) 71 16 128/72 94 %   07/21/19 1900  74 17 133/62 95 %   07/21/19 1800  78 21 139/69 96 %   07/21/19 1730  73 14 144/59 96 %   07/21/19 1702  83  149/64    07/21/19 1700  66 15 153/72 95 %   07/21/19 1609  86  134/89    07/21/19 1600 99.1 °F (37.3 °C) 84 16 134/89 97 %   07/21/19 1500  67 19 136/69 99 %   07/21/19 1431  74  151/74    07/21/19 1400  66 15 146/72 95 %       Intake/Output Summary (Last 24 hours) at 7/22/2019 1306  Last data filed at 7/22/2019 0700  Gross per 24 hour   Intake 3090 ml   Output 500 ml   Net 2590 ml        PHYSICAL EXAM:  General: WD, WN. Alert, cooperative, no acute distress    EENT:  EOMI. Anicteric sclerae. MMM  Resp:  CTA bilaterally, no wheezing or rales. No accessory muscle use  CV:  Regular  rhythm,  No edema  GI:  Soft, Non distended, Non tender.  +Bowel sounds  Neurologic:  Alert and oriented X 3, normal speech,   Psych:   Good insight. Not anxious nor agitated  Skin:  No rashes.   No jaundice    Reviewed most current lab test results and cultures  YES  Reviewed most current radiology test results   YES  Review and summation of old records today    NO  Reviewed patient's current orders and MAR    YES  PMH/SH reviewed - no change compared to H&P  ________________________________________________________________________  Care Plan discussed with:    Comments   Patient     Family      RN Care Manager     Consultant                        Multidiciplinary team rounds were held today with , nursing, pharmacist and clinical coordinator. Patient's plan of care was discussed; medications were reviewed and discharge planning was addressed. ________________________________________________________________________  Total NON critical care TIME: 35 Minutes    Total CRITICAL CARE TIME Spent:   Minutes non procedure based      Comments   >50% of visit spent in counseling and coordination of care     ________________________________________________________________________  Bossman Hines MD     Procedures: see electronic medical records for all procedures/Xrays and details which were not copied into this note but were reviewed prior to creation of Plan. LABS:  I reviewed today's most current labs and imaging studies.   Pertinent labs include:  Recent Labs     07/20/19  0359 07/19/19  1601   WBC 10.0 11.7*   HGB 12.7 15.1   HCT 39.8 46.0    276     Recent Labs     07/22/19  0429 07/21/19  0557 07/20/19  0359    142 141   K 3.6 3.8 3.5   * 110* 105   CO2 23 26 28   * 215* 169*   BUN 20 28* 38*   CREA 1.03* 1.17* 1.33*   CA 8.9 8.3* 8.9   ALB 3.0* 2.6*  --    TBILI 0.8 0.6  --    SGOT 13* 13*  --    ALT 20 17  --        Signed: Bossman Hines MD

## 2019-07-22 NOTE — PROGRESS NOTES
Occupational therapy  -   07.22.2019    Orders acknowledged and chart reviewed in prep for OT evaluation, US at bedside. Will defer and f/u later in PM vs. tomorrow as able and appropriate. Thank you. Isabela Garcia MS, OTR/L

## 2019-07-22 NOTE — PROGRESS NOTES
0510: HR noted to be 140's-150's, PRN labetalol given  0515: BP 95/73 (79), Cardene off.  0526: Spoke with Juvenal SIEGEL, about afib/aflutter rate, reviewing the chart.    0530: New PRN orders placed by Dominic Weinstein NP

## 2019-07-22 NOTE — PROGRESS NOTES
[facility] Cardiology Progress Note    Date of service: 7/22/2019    Subjective:  Ms Brittany Medina has no new complaints    Objective:    Visit Vitals  /79   Pulse 71   Temp 98.2 °F (36.8 °C)   Resp 13   Wt 95.7 kg (210 lb 15.7 oz)   SpO2 98%   BMI 36.21 kg/m²       Physical Exam   Constitutional: She is oriented to person, place, and time. She appears well-developed and well-nourished. HENT:   Head: Normocephalic and atraumatic. Eyes: Conjunctivae are normal. No scleral icterus. Neck: No JVD present. Cardiovascular: Normal rate, regular rhythm and normal heart sounds. Exam reveals no gallop. No murmur heard. Pulmonary/Chest: Effort normal and breath sounds normal. No stridor. No respiratory distress. She has no wheezes. She has no rales. Abdominal: Soft. She exhibits no distension. Musculoskeletal: She exhibits no edema or deformity. Neurological: She is alert and oriented to person, place, and time. Skin: Skin is warm and dry. Psychiatric: She has a normal mood and affect. Her behavior is normal.       Data reviewed:  Recent Results (from the past 12 hour(s))   GLUCOSE, POC    Collection Time: 07/22/19 12:47 AM   Result Value Ref Range    Glucose (POC) 187 (H) 65 - 100 mg/dL    Performed by Sarahi Monday    METABOLIC PANEL, COMPREHENSIVE    Collection Time: 07/22/19  4:29 AM   Result Value Ref Range    Sodium 141 136 - 145 mmol/L    Potassium 3.6 3.5 - 5.1 mmol/L    Chloride 109 (H) 97 - 108 mmol/L    CO2 23 21 - 32 mmol/L    Anion gap 9 5 - 15 mmol/L    Glucose 183 (H) 65 - 100 mg/dL    BUN 20 6 - 20 MG/DL    Creatinine 1.03 (H) 0.55 - 1.02 MG/DL    BUN/Creatinine ratio 19 12 - 20      GFR est AA >60 >60 ml/min/1.73m2    GFR est non-AA 53 (L) >60 ml/min/1.73m2    Calcium 8.9 8.5 - 10.1 MG/DL    Bilirubin, total 0.8 0.2 - 1.0 MG/DL    ALT (SGPT) 20 12 - 78 U/L    AST (SGOT) 13 (L) 15 - 37 U/L    Alk.  phosphatase 74 45 - 117 U/L    Protein, total 6.8 6.4 - 8.2 g/dL    Albumin 3.0 (L) 3.5 - 5.0 g/dL    Globulin 3.8 2.0 - 4.0 g/dL    A-G Ratio 0.8 (L) 1.1 - 2.2     GLUCOSE, POC    Collection Time: 07/22/19  6:25 AM   Result Value Ref Range    Glucose (POC) 186 (H) 65 - 100 mg/dL    Performed by Ely De Jesus      Telemetry reviewed: currently NSR. Was in a.fib early hours of the morning, rate mostly around 110 bpm, brief spells up to 140s    Assessment:    1. Acute subarachnoid hemorrhage  2. Bilateral carotid artery disease  3. Paroxysmal atrial fibrillation  / flutter  -Currently in NSR. Asymptomatic when in fibrillation and rate was not particularly fast.  4. Type II diabetes on insulin, poorly controlled    Plan:    Adding back metoprolol 12.5mg bid  No anticoagulation given subarachnoid hemorrhage    Would like to see recommendations from Neurosurgery and Neurology about when / if it would be a candidate for Eliquis. Will sign off for now as a.fib management is fairly straight forward and all we can/need to do right now is treat with beta blocker    Suggest f/u with me about 2 weeks post discharge. Available to see upon request as needed while inpatient. Signed:  Abbey Parker MD  Interventional Cardiology  7/22/2019

## 2019-07-22 NOTE — PROGRESS NOTES
Problem: Mobility Impaired (Adult and Pediatric)  Goal: *Acute Goals and Plan of Care (Insert Text)  Description  Physical Therapy Goals  Initiated 7/21/2019  1. Patient will move from supine to sit and sit to supine  and scoot up and down in bed with independence within 7 day(s). 2.  Patient will transfer from bed to chair and chair to bed with independence using the least restrictive device within 7 day(s). 3.  Patient will perform sit to stand with independence within 7 day(s). 4.  Patient will ambulate with independence for 300 feet with the least restrictive device within 7 day(s). 5.  Patient will ascend/descend 14 stairs with handrail(s) with supervision/set-up within 7 day(s). 6.  Patient will improve Johnson Balance score by 7 points within 7 days. Outcome: Progressing Towards Goal   PHYSICAL THERAPY TREATMENT  Patient: Yinka Carter (9 y.o. female)  Date: 7/22/2019  Diagnosis: ICH (intracerebral hemorrhage) ([facility]) [I61.9] SAH (subarachnoid hemorrhage) ([facility])       Precautions:    Chart, physical therapy assessment, plan of care and goals were reviewed. ASSESSMENT:  Cleared for mobility by RN. Received pt seated in chair - awoke to voice and agreeable to participate in PT tx session. Pt continues to make progress towards established goals however remains limited by anxiety, fear of falling, and slightly impaired balance/gait. Today, emphasis on progressive gait training. Completed all transfers with CGA. Initiated gait training x40ft with CGA/min A (B HHA) - demos slow pace, decreased step length, and mild unsteadiness however no overt LOB. Pt continues to demo significant caution and fear of falling during ambulation - provided verbal cues to maintain upright posture with eyes facing forward to assist in normalizing gait pattern. Quick fatigue. Returned pt to chair at end of session, NAD. At this time, recommend discharge home once medically cleared.  Will continue to follow for higher level gait/balance and stair progression. Progression toward goals:  ?       Improving appropriately and progressing toward goals  ? Improving slowly and progressing toward goals  ? Not making progress toward goals and plan of care will be adjusted     PLAN:  Patient continues to benefit from skilled intervention to address the above impairments. Continue treatment per established plan of care. Discharge Recommendations:  Home   Further Equipment Recommendations for Discharge:  TBD      SUBJECTIVE:   Patient stated I just don't want to fall.     OBJECTIVE DATA SUMMARY:   Critical Behavior:  Neurologic State: Alert, Appropriate for age  Orientation Level: Oriented X4  Cognition: Appropriate decision making, Appropriate for age attention/concentration, Appropriate safety awareness  Safety/Judgement: Awareness of environment, Insight into deficits  Functional Mobility Training:  Transfers:  Sit to Stand: Contact guard assistance  Stand to Sit: Contact guard assistance    Balance:  Sitting: Intact  Standing: Impaired  Standing - Static: Good  Standing - Dynamic : Fair    Ambulation/Gait Training:  Distance (ft): 40 Feet (ft)  Assistive Device: Gait belt  Ambulation - Level of Assistance: Contact guard assistance  Gait Abnormalities: Trunk sway increased  Speed/Maira: Slow  Step Length: Left shortened;Right shortened    Pain:  Pain Scale 1: Numeric (0 - 10)  Pain Intensity 1: 0     Activity Tolerance:   VSS    Please refer to the flowsheet for vital signs taken during this treatment. After treatment:   ? Patient left in no apparent distress sitting up in chair  ? Patient left in no apparent distress in bed  ? Call bell left within reach  ? Nursing notified  ? Caregiver present  ? Bed alarm activated    COMMUNICATION/EDUCATION:   The patients plan of care was discussed with: Registered Nurse.     ?  Fall prevention education was provided and the patient/caregiver indicated understanding. ? Patient/family have participated as able in goal setting and plan of care. ?  Patient/family agree to work toward stated goals and plan of care. ?  Patient understands intent and goals of therapy, but is neutral about his/her participation. ? Patient is unable to participate in goal setting and plan of care. Thank you for this referral.  Markos Benjamin, SPT         Regarding student involvement in patient care:  A student participated in this treatment session. Per CMS Medicare statements and APTA guidelines I certify that the following was true:  1. I was present and directly observed the entire session. 2. I made all skilled judgments and clinical decisions regarding care. 3. I am the practitioner responsible for assessment, treatment, and documentation.

## 2019-07-22 NOTE — PROGRESS NOTES
Neurointerventional Surgery Progress Note  Marlen Maldonado, [facility]  Neurocritical Care NP  [phone]    Admit Date: 2019   LOS: 3 days        Daily Progress Note: 2019    POD: 2 diagnostic cerebral angiogram by Dr. Ellyn Hernandez    HPI: Tay Moser is a 80 y.o. female with a PMH significant for HTN, Type 1 DM, hx of acute renal failure, and atrial fibrillation who presented to [facility] ED on  due to complaints of headache and weakness. Patient reported on 19 she started vomiting and then subsequently developed a headache the next day. She tried taking tylenol for her headache with some relief. She reported a hx of a minor headache, but this headache was worse than usual for her. The patient also reported weakness x 2 days causing a few falls at home. She denied any LOC. She reported her blood glucose has been elevated over the past 5 days, more so than usual. In the ED, a stat head CT was performed which showed SAH in the left sylvian fissure and basal cisterns. 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 She was transferred to [facility] for higher level of care. She is not a smoker and reports her uncle  from a cerebral aneurysm. Subjective:   Patient is sitting up in bed. She currently denies any headache, but will have periods of a mild frontal headache. She is receiving Tylenol for pain management. She does complain of intermittent mild nausea. She denies any vision changes, dizziness, vomiting, abdominal pain, chest pain, SOB, numbness, tingling, or weakness. She does complain that her balance is off when she got up out of the bed.      Current Facility-Administered Medications   Medication Dose Route Frequency Provider Last Rate Last Dose    metoprolol (LOPRESSOR) injection 5 mg  5 mg IntraVENous Q15MIN PRN Shirin Baker NP        labetalol (NORMODYNE;TRANDATE) 20 mg/4 mL (5 mg/mL) injection 20 mg  20 mg IntraVENous Q4H PRN Anjelica Stanley NP        insulin glargine (LANTUS) injection 14 Units  14 Units SubCUTAneous BID Bradford Reyes MD   14 Units at 07/22/19 0837    metoprolol tartrate (LOPRESSOR) tablet 12.5 mg  12.5 mg Oral BID Jaspal Duran MD        lactated Ringers infusion  125 mL/hr IntraVENous CONTINUOUS Jhonatan Goncalves  mL/hr at 07/22/19 0958 125 mL/hr at 07/22/19 0958    insulin lispro (HUMALOG) injection   SubCUTAneous AC&HS Patricia Reyes MD        [START ON 7/23/2019] pantoprazole (PROTONIX) tablet 40 mg  40 mg Oral ACB Patricia Reyes MD        levETIRAcetam (KEPPRA) tablet 500 mg  500 mg Oral Q12H Bradford Reyes MD        polyethylene glycol (MIRALAX) packet 17 g  17 g Oral DAILY PRN Jhonatan Goncalves NP        insulin lispro (HUMALOG) injection 5 Units  5 Units SubCUTAneous Edilberto Estrella MD   5 Units at 07/22/19 2451    hydrALAZINE (APRESOLINE) 20 mg/mL injection 10 mg  10 mg IntraVENous Q6H PRN Jhonatan Goncalves NP   10 mg at 07/22/19 0051    docusate sodium (COLACE) capsule 100 mg  100 mg Oral DAILY Jhonatan Goncalves NP   100 mg at 07/22/19 0837    niCARdipine (CARDENE) 25 mg in 0.9% sodium chloride 250 mL infusion  0-15 mg/hr IntraVENous TITRATE Marin Pineda MD   Stopped at 07/22/19 0516    ondansetron (ZOFRAN) injection 4 mg  4 mg IntraVENous Q6H PRN Robin Kerr MD   4 mg at 07/22/19 0914    naloxone (NARCAN) injection 0.4 mg  0.4 mg IntraVENous PRN Robin Kerr MD        acetaminophen (TYLENOL) tablet 650 mg  650 mg Oral Q4H PRN Robin Kerr MD   650 mg at 07/22/19 4933    Or    acetaminophen (TYLENOL) solution 650 mg  650 mg Per NG tube Q4H PRN Robin Kerr MD        Or   Jesús Smart acetaminophen (TYLENOL) suppository 650 mg  650 mg Rectal Q4H PRN Robin Kerr MD        glucose chewable tablet 16 g  4 Tab Oral PRN Robin Kerr MD        dextrose (D50W) injection syrg 12.5-25 g  25-50 mL IntraVENous PRN Robin Kerr MD        glucagon (GLUCAGEN) injection 1 mg  1 mg IntraMUSCular PRN Kiana Browning MD        niMODipine (NIMOTOP) capsule 60 mg  60 mg Oral Q4H Alexis Arroyo NP   60 mg at 19 0837        No Known Allergies    Review of Systems:  Pertinent items are noted in the History of Present Illness. Objective:     Vital signs  Temp (24hrs), Av.7 °F (37.1 °C), Min:98.2 °F (36.8 °C), Max:99.2 °F (37.3 °C)   No intake/output data recorded.  1901 -  0700  In: 6230 [P.O.:880; I.V.:5350]  Out: 1300 [Urine:1300]    Visit Vitals  /42   Pulse 68   Temp (P) 98.7 °F (37.1 °C)   Resp 20   Wt 210 lb 15.7 oz (95.7 kg)   SpO2 96%   BMI 36.21 kg/m²    O2 Flow Rate (L/min): 2 l/min O2 Device: Nasal cannula     Pain control  Pain Assessment  Pain Scale 1: (P) Numeric (0 - 10)  Pain Intensity 1: 0    PT/OT  Gait     Gait  Base of Support: Widened  Speed/Maira: Slow  Step Length: Right shortened, Left shortened  Gait Abnormalities: Trunk sway increased  Ambulation - Level of Assistance: Contact guard assistance, Minimal assistance  Distance (ft): 10 Feet (ft)  Assistive Device: Gait belt        Vitals:    19 1000 19 1015 19 1030 19 1045   BP: 122/49 (!) 125/93 (!) 172/139 124/42   Pulse:  73 74 68   Resp: 24 25 20 20   Temp:       SpO2: 95% 95% 96% 96%   Weight:            Physical Exam:  GENERAL: alert, cooperative, no distress, appears stated age  LUNG: clear to auscultation bilaterally  HEART: regular rate and rhythm, S1, S2 normal, no murmur, click, rub or gallop  EXTREMITIES:  extremities normal, atraumatic, no cyanosis, mild non-pitting edema in bilateral hands. 2+ distal pulses bilaterally. SKIN: no rash or abnormalities. Skin warm to touch. Right groin site clean, dry, and intact. No hematoma, bruising, or bleeding at site. Neurologic Exam:  Mental Status:  Alert and oriented x 4. Appropriate affect, mood and behavior.        Language:    Normal fluency, repetition, comprehension and naming. Cranial Nerves:        Pupils equal, round and reactive to light. Visual fields full to confrontation. Extraocular movements intact. Facial sensation intact. Full facial strength, no asymmetry. Hearing grossly intact bilaterally. No dysarthria. Tongue protrudes to midline, palate elevates symmetrically. Shoulder shrug 5/5 bilaterally. Motor:    No pronator drift. Bulk and tone normal.      5/5 power in all extremities proximally and distally. No involuntary movements. Sensation:    Sensation intact throughout to light touch. Coordination & Gait: No ataxia with FTN and HTS testing. Gait deferred. 24 hour results:    Recent Results (from the past 24 hour(s))   GLUCOSE, POC    Collection Time: 07/21/19  5:45 PM   Result Value Ref Range    Glucose (POC) 208 (H) 65 - 100 mg/dL    Performed by [address] [place] [address], POC    Collection Time: 07/22/19 12:47 AM   Result Value Ref Range    Glucose (POC) 187 (H) 65 - 100 mg/dL    Performed by Daiana Hoover    METABOLIC PANEL, COMPREHENSIVE    Collection Time: 07/22/19  4:29 AM   Result Value Ref Range    Sodium 141 136 - 145 mmol/L    Potassium 3.6 3.5 - 5.1 mmol/L    Chloride 109 (H) 97 - 108 mmol/L    CO2 23 21 - 32 mmol/L    Anion gap 9 5 - 15 mmol/L    Glucose 183 (H) 65 - 100 mg/dL    BUN 20 6 - 20 MG/DL    Creatinine 1.03 (H) 0.55 - 1.02 MG/DL    BUN/Creatinine ratio 19 12 - 20      GFR est AA >60 >60 ml/min/1.73m2    GFR est non-AA 53 (L) >60 ml/min/1.73m2    Calcium 8.9 8.5 - 10.1 MG/DL    Bilirubin, total 0.8 0.2 - 1.0 MG/DL    ALT (SGPT) 20 12 - 78 U/L    AST (SGOT) 13 (L) 15 - 37 U/L    Alk.  phosphatase 74 45 - 117 U/L    Protein, total 6.8 6.4 - 8.2 g/dL    Albumin 3.0 (L) 3.5 - 5.0 g/dL    Globulin 3.8 2.0 - 4.0 g/dL    A-G Ratio 0.8 (L) 1.1 - 2.2     GLUCOSE, POC    Collection Time: 07/22/19  6:25 AM   Result Value Ref Range    Glucose (POC) 186 (H) 65 - 100 mg/dL    Performed by Reyna So Chiara           Imaging:  CT of Head on 7/19/2019 shows  IMPRESSION:   Acute subarachnoid hemorrhage in the left sylvian fissure and basal cisterns. No  evidence for acute infarct. CTA of the Head and Neck on 7/19/2019 shows  IMPRESSION:  Severe atherosclerotic change in the cervical internal carotid artery. Greater  than 90% stenosis of the proximal left internal carotid artery.     Moderate stenosis right internal carotid artery approximately 50%.     No intracranial aneurysm. Minimal irregularity of M2 segments may be related to  mild vasospasm. Stable degree of subarachnoid hemorrhage when compared to the CT  of the head performed earlier in the day. CT of Head on 7/20/2019 shows  IMPRESSION:  1. Stable small to moderate volume subarachnoid hemorrhage as described above,  largest components in the suprasellar cistern, prepontine cistern, and left sylvian fissure. CT of Head on 7/22/2019 shows  IMPRESSION  IMPRESSION: Decreased subarachnoid hemorrhage. No new intracranial hemorrhage. Assessment:     Principal Problem:    SAH (subarachnoid hemorrhage) ([address] (7/20/2019)        Plan:   1. SAH, Savage Mcdonald 1, Perez Grade 2, PBD day 7   - as seen on CT/CTA, no aneurysm seen on CTA   - neuro exam non-focal   - s/p cerebral angiogram on 7/20 which did not show an obvious aneurysm to explain blood pattern, 1 mm x 1 mm triangular outpouching on distal right NGUYEN trunk; ? Infundibulum, tiny aneurysm seen on angiogram, patient will need a repeat angiogram in one week.     - Repeat Head CT this morning stable, shows a decrease in [facility]   - most likely a perimesencephalic SAH              - Continue Nimotop day 4 of 21 for prevention of delayed cerebral ischemia               - Continue Keppra 500 BID for seizure ppx              - LR at 125 ml/hr to maintain euvolemia, 500 ml of NS bolus ordered to maintain euvolemia   - start TCD's today daily               - Strict I&O's              - TTE shows EF 56-60%, moderate pulmonary HTN, trace mitral valve regurgitation, trace tricuspid valve regurgitation               - every 2 hour neuro checks during the day and every 4 hours at night               - SBP goal less than 140, Cardene PRN              - PT/OT/SLP evals    - NIS following, Neurosurgery following     2. Hx of HTN   - SBP goal <140   - Cardene PRN, hydralazine/Labetalol PRN   - Hospitalist following    3. DM type 1   - sliding scale insulin and point of care glucose checks   - Management per hospitalist     Activity: Up with assistance   DVT ppx: SCDs  Dispo: TBD     Plan d/w Dr. Florina Alcantar, ICU RN, patient, and family. Continue to monitor in ICU.       Sandra Alicea NP

## 2019-07-22 NOTE — PROGRESS NOTES
Reason for Admission:   [facility]                   RRAT Score:  10/ low                   Plan for utilizing home health:  TBD                        Current Advanced Directive/Advance Care Plan: Not on file,  Mireya Camargo is CECILIA [facility] [phone]                         Transition of Care Plan:      Patient presented to the ED with right sided weakness and upper arm drift. Head CT: SAH. Patient taken to IR on 7/20 for Cerebral angio. Care manager met with patient to introduce self and discuss transitions of care. Patient lives independently with her  Mireya Camargo [phone]. Patient lives in a 2 level home with 13 steps inside and 4 to enter. Per patient she goes to [facility], however she never sees the same doctor twice, she is seen at least yearly, the last time was Thursday July 18th, and uses the [facility] on [facility] as her pharmacy. Patient confirmed her insurance to be [employer] ( still works) and she also has Medicare A only. Care manager will follow for transitions of care. Malik Braun RN,CRM    Care Management Interventions  PCP Verified by CM: Yes([facility] )  Palliative Care Criteria Met (RRAT>21 & CHF Dx)?: No  MyChart Signup: Yes  Discharge Durable Medical Equipment: No  Physical Therapy Consult: Yes  Occupational Therapy Consult: Yes  Speech Therapy Consult: Yes  Current Support Network: Lives with Spouse(Tavo Samuels [phone])  Confirm Follow Up Transport: Family  Plan discussed with Pt/Family/Caregiver:  Yes

## 2019-07-22 NOTE — PROGRESS NOTES
[facility] [address] [facility]  INTENSIVIST DAILY PROGRESS NOTE  Name: Cristopher Slater   : 1950   MRN: [id number]   Date: 2019 11:15 AM     D/o multi-D rounds  New paroxysmal Afib> BB; cards following       IMPRESSION:   · SAH  · bilat  carotid disease  · Renal insufficiency: Cr improving with IV fluids  · Paroxysmal Afib  · Type 2 DM  · Htn  · GERD- on PPI at home   PLAN:   · [facility] management per NS.     · Cardene OFF  · BB per cards  · Neuro checks  · Speech  · Resume home PPI rx  · Monitor labs   GLOBAL ISSUES:     · DVT Prophylaxis: SCDS  · On home PPI       Available as needed acute issues    Brynn King MD

## 2019-07-23 ENCOUNTER — APPOINTMENT (OUTPATIENT)
Dept: VASCULAR SURGERY | Age: 69
DRG: 065 | End: 2019-07-23
Attending: NURSE PRACTITIONER
Payer: COMMERCIAL

## 2019-07-23 LAB
GLUCOSE BLD STRIP.AUTO-MCNC: 182 MG/DL (ref 65–100)
GLUCOSE BLD STRIP.AUTO-MCNC: 203 MG/DL (ref 65–100)
GLUCOSE BLD STRIP.AUTO-MCNC: 213 MG/DL (ref 65–100)
GLUCOSE BLD STRIP.AUTO-MCNC: 216 MG/DL (ref 65–100)
SERVICE CMNT-IMP: ABNORMAL

## 2019-07-23 PROCEDURE — 74011636637 HC RX REV CODE- 636/637: Performed by: INTERNAL MEDICINE

## 2019-07-23 PROCEDURE — 74011250637 HC RX REV CODE- 250/637: Performed by: FAMILY MEDICINE

## 2019-07-23 PROCEDURE — 74011250637 HC RX REV CODE- 250/637: Performed by: INTERNAL MEDICINE

## 2019-07-23 PROCEDURE — 93886 INTRACRANIAL COMPLETE STUDY: CPT

## 2019-07-23 PROCEDURE — 74011000250 HC RX REV CODE- 250: Performed by: NURSE PRACTITIONER

## 2019-07-23 PROCEDURE — 74011250636 HC RX REV CODE- 250/636: Performed by: FAMILY MEDICINE

## 2019-07-23 PROCEDURE — 74011250636 HC RX REV CODE- 250/636: Performed by: NURSE PRACTITIONER

## 2019-07-23 PROCEDURE — 97116 GAIT TRAINING THERAPY: CPT

## 2019-07-23 PROCEDURE — 97165 OT EVAL LOW COMPLEX 30 MIN: CPT

## 2019-07-23 PROCEDURE — 65610000006 HC RM INTENSIVE CARE

## 2019-07-23 PROCEDURE — 74011636637 HC RX REV CODE- 636/637: Performed by: FAMILY MEDICINE

## 2019-07-23 PROCEDURE — 97530 THERAPEUTIC ACTIVITIES: CPT

## 2019-07-23 PROCEDURE — 82962 GLUCOSE BLOOD TEST: CPT

## 2019-07-23 PROCEDURE — 74011250637 HC RX REV CODE- 250/637: Performed by: NURSE PRACTITIONER

## 2019-07-23 RX ORDER — METOPROLOL TARTRATE 25 MG/1
25 TABLET, FILM COATED ORAL 2 TIMES DAILY
Status: DISCONTINUED | OUTPATIENT
Start: 2019-07-23 | End: 2019-07-29 | Stop reason: HOSPADM

## 2019-07-23 RX ORDER — HYDRALAZINE HYDROCHLORIDE 20 MG/ML
10 INJECTION INTRAMUSCULAR; INTRAVENOUS
Status: DISCONTINUED | OUTPATIENT
Start: 2019-07-23 | End: 2019-07-28

## 2019-07-23 RX ORDER — INSULIN GLARGINE 100 [IU]/ML
4 INJECTION, SOLUTION SUBCUTANEOUS ONCE
Status: COMPLETED | OUTPATIENT
Start: 2019-07-23 | End: 2019-07-23

## 2019-07-23 RX ORDER — INSULIN GLARGINE 100 [IU]/ML
15 INJECTION, SOLUTION SUBCUTANEOUS 2 TIMES DAILY
Status: DISCONTINUED | OUTPATIENT
Start: 2019-07-23 | End: 2019-07-23

## 2019-07-23 RX ORDER — LABETALOL HCL 20 MG/4 ML
10 SYRINGE (ML) INTRAVENOUS
Status: DISCONTINUED | OUTPATIENT
Start: 2019-07-23 | End: 2019-07-24

## 2019-07-23 RX ORDER — INSULIN GLARGINE 100 [IU]/ML
18 INJECTION, SOLUTION SUBCUTANEOUS 2 TIMES DAILY
Status: DISCONTINUED | OUTPATIENT
Start: 2019-07-23 | End: 2019-07-24

## 2019-07-23 RX ADMIN — NIMODIPINE 60 MG: 30 CAPSULE, LIQUID FILLED ORAL at 12:26

## 2019-07-23 RX ADMIN — INSULIN LISPRO 3 UNITS: 100 INJECTION, SOLUTION INTRAVENOUS; SUBCUTANEOUS at 17:50

## 2019-07-23 RX ADMIN — INSULIN LISPRO 5 UNITS: 100 INJECTION, SOLUTION INTRAVENOUS; SUBCUTANEOUS at 12:26

## 2019-07-23 RX ADMIN — INSULIN LISPRO 5 UNITS: 100 INJECTION, SOLUTION INTRAVENOUS; SUBCUTANEOUS at 07:17

## 2019-07-23 RX ADMIN — INSULIN GLARGINE 14 UNITS: 100 INJECTION, SOLUTION SUBCUTANEOUS at 08:13

## 2019-07-23 RX ADMIN — LEVETIRACETAM 500 MG: 500 TABLET, FILM COATED ORAL at 21:27

## 2019-07-23 RX ADMIN — INSULIN GLARGINE 4 UNITS: 100 INJECTION, SOLUTION SUBCUTANEOUS at 12:25

## 2019-07-23 RX ADMIN — LEVETIRACETAM 500 MG: 500 TABLET, FILM COATED ORAL at 08:14

## 2019-07-23 RX ADMIN — INSULIN LISPRO 3 UNITS: 100 INJECTION, SOLUTION INTRAVENOUS; SUBCUTANEOUS at 07:19

## 2019-07-23 RX ADMIN — NIMODIPINE 60 MG: 30 CAPSULE, LIQUID FILLED ORAL at 17:35

## 2019-07-23 RX ADMIN — ACETAMINOPHEN 650 MG: 325 TABLET ORAL at 02:08

## 2019-07-23 RX ADMIN — HYDRALAZINE HYDROCHLORIDE 10 MG: 20 INJECTION INTRAMUSCULAR; INTRAVENOUS at 01:46

## 2019-07-23 RX ADMIN — PANTOPRAZOLE SODIUM 40 MG: 40 TABLET, DELAYED RELEASE ORAL at 07:16

## 2019-07-23 RX ADMIN — NIMODIPINE 60 MG: 30 CAPSULE, LIQUID FILLED ORAL at 21:27

## 2019-07-23 RX ADMIN — INSULIN LISPRO 2 UNITS: 100 INJECTION, SOLUTION INTRAVENOUS; SUBCUTANEOUS at 22:38

## 2019-07-23 RX ADMIN — INSULIN LISPRO 2 UNITS: 100 INJECTION, SOLUTION INTRAVENOUS; SUBCUTANEOUS at 12:45

## 2019-07-23 RX ADMIN — NIMODIPINE 60 MG: 30 CAPSULE, LIQUID FILLED ORAL at 00:00

## 2019-07-23 RX ADMIN — METOPROLOL TARTRATE 25 MG: 25 TABLET ORAL at 17:35

## 2019-07-23 RX ADMIN — SODIUM CHLORIDE, SODIUM LACTATE, POTASSIUM CHLORIDE, AND CALCIUM CHLORIDE 125 ML/HR: 600; 310; 30; 20 INJECTION, SOLUTION INTRAVENOUS at 18:36

## 2019-07-23 RX ADMIN — DOCUSATE SODIUM 100 MG: 100 CAPSULE, LIQUID FILLED ORAL at 08:13

## 2019-07-23 RX ADMIN — NIMODIPINE 60 MG: 30 CAPSULE, LIQUID FILLED ORAL at 07:15

## 2019-07-23 RX ADMIN — ONDANSETRON 4 MG: 2 INJECTION INTRAMUSCULAR; INTRAVENOUS at 13:01

## 2019-07-23 RX ADMIN — HYDRALAZINE HYDROCHLORIDE 10 MG: 20 INJECTION INTRAMUSCULAR; INTRAVENOUS at 12:51

## 2019-07-23 RX ADMIN — SODIUM CHLORIDE, SODIUM LACTATE, POTASSIUM CHLORIDE, AND CALCIUM CHLORIDE 125 ML/HR: 600; 310; 30; 20 INJECTION, SOLUTION INTRAVENOUS at 07:24

## 2019-07-23 RX ADMIN — INSULIN GLARGINE 18 UNITS: 100 INJECTION, SOLUTION SUBCUTANEOUS at 22:37

## 2019-07-23 RX ADMIN — ACETAMINOPHEN 650 MG: 325 TABLET ORAL at 12:51

## 2019-07-23 RX ADMIN — METOPROLOL TARTRATE 12.5 MG: 25 TABLET ORAL at 08:13

## 2019-07-23 RX ADMIN — INSULIN LISPRO 5 UNITS: 100 INJECTION, SOLUTION INTRAVENOUS; SUBCUTANEOUS at 17:35

## 2019-07-23 RX ADMIN — ONDANSETRON 4 MG: 2 INJECTION INTRAMUSCULAR; INTRAVENOUS at 02:10

## 2019-07-23 RX ADMIN — METOPROLOL TARTRATE 5 MG: 5 INJECTION INTRAVENOUS at 15:01

## 2019-07-23 NOTE — PROGRESS NOTES
Problem: Mobility Impaired (Adult and Pediatric)  Goal: *Acute Goals and Plan of Care (Insert Text)  Description  Physical Therapy Goals  Initiated 7/21/2019  1. Patient will move from supine to sit and sit to supine  and scoot up and down in bed with independence within 7 day(s). 2.  Patient will transfer from bed to chair and chair to bed with independence using the least restrictive device within 7 day(s). 3.  Patient will perform sit to stand with independence within 7 day(s). 4.  Patient will ambulate with independence for 300 feet with the least restrictive device within 7 day(s). 5.  Patient will ascend/descend 14 stairs with handrail(s) with supervision/set-up within 7 day(s). 6.  Patient will improve Johnson Balance score by 7 points within 7 days. Outcome: Progressing Towards Goal     PHYSICAL THERAPY TREATMENT  Patient: Carisa James (62 y.o. female)  Date: 7/23/2019  Diagnosis: ICH (intracerebral hemorrhage) ([facility]) [I61.9] SAH (subarachnoid hemorrhage) ([facility])       Precautions:    Chart, physical therapy assessment, plan of care and goals were reviewed. ASSESSMENT:  Cleared for mobility by RN. Received supine in bed, flat affected and reports generally \"feeling off and not good today\". Unable to further pin point however neuro check in tact and unchanged from PT eval. Note pitting edema x4 extremities, most pronounced in B UEs. Overall demos slow progress towards established goals and was able to complete bed mobility with up to min A for righting trunk to upright. Completed sit<>stands and transfers from bed>BSC>recliner with CGA for safety - no major LOB noted but appears generally slightly unsteady and quick to fatigue. HR ranging low 100s to 150s with minimal activity therefore further gait progression deferred and RN notified. Pt remained up in chair and set up for lunch at end of session, NAD.     Hopefull she will continue to progress well, anticipating discharge home once medically cleared. Progression toward goals:  ?       Improving appropriately and progressing toward goals  ? Improving slowly and progressing toward goals  ? Not making progress toward goals and plan of care will be adjusted     PLAN:  Patient continues to benefit from skilled intervention to address the above impairments. Continue treatment per established plan of care. Discharge Recommendations: To Be Determined and None  Further Equipment Recommendations for Discharge:  TBD      SUBJECTIVE:   Patient stated I just don't feel right today.     OBJECTIVE DATA SUMMARY:   Critical Behavior:  Neurologic State: Alert  Orientation Level: Oriented X4  Cognition: Appropriate for age attention/concentration, Follows commands  Safety/Judgement: Awareness of environment, Insight into deficits  Functional Mobility Training:  Bed Mobility:   Supine to Sit: Minimum assistance        Transfers:  Sit to Stand: Contact guard assistance  Stand to Sit: Contact guard assistance     Balance:  Sitting: Intact  Standing: Impaired  Standing - Static: Good  Standing - Dynamic : Fair  Ambulation/Gait Training:  Distance (ft): 5 Feet (ft)  Assistive Device: Gait belt  Ambulation - Level of Assistance: Contact guard assistance  Gait Abnormalities: Trunk sway increased  Speed/Maira: Slow  Step Length: Right shortened;Left shortened    Pain:  Pain Scale 1: Numeric (0 - 10)  Pain Intensity 1: 0       Activity Tolerance:   JAMES with minimal activity  HR ranging low 100s to 150s with just transfers     Please refer to the flowsheet for vital signs taken during this treatment. After treatment:   ? Patient left in no apparent distress sitting up in chair  ? Patient left in no apparent distress in bed  ? Call bell left within reach  ? Nursing notified  ? Caregiver present  ? Bed alarm activated    COMMUNICATION/EDUCATION:   The patients plan of care was discussed with: Registered Nurse.       ?  Fall prevention education was provided and the patient/caregiver indicated understanding. ? Patient/family have participated as able in goal setting and plan of care. ?  Patient/family agree to work toward stated goals and plan of care. ?  Patient understands intent and goals of therapy, but is neutral about his/her participation. ? Patient is unable to participate in goal setting and plan of care.     Thank you for this referral.  Denise Gallego, PT, DPT   Time Calculation: 23 mins

## 2019-07-23 NOTE — PROGRESS NOTES
0730 Shift Summary--Pt is neuro intact-- She required Tylenol x 1 for headache zofran X 1 for nausea and Hydralazine x 1 for  BP consistently above 432 systolic----- BP came down to acceptable parameters, Pt stated that she rested well with Q 4 hr VS and neuro checks--Up in chair in good spirits-- Will have TCD today--

## 2019-07-23 NOTE — PROGRESS NOTES
Neurointerventional Surgery Progress Note  Kg Johnson AGACNP-BC  [phone]    Admit Date: 2019   LOS: 4 days        Daily Progress Note: 2019    POD: 3 diagnostic cerebral angiogram by Dr. Katy Shields    HPI: Jimbo Martinez is a 80 y.o. female with a PMH significant for HTN, Type 1 DM, hx of acute renal failure, and atrial fibrillation who presented to [address] [place] [address] ED on  due to complaints of headache and weakness. Patient reported on 19 she started vomiting and then subsequently developed a headache the next day. She tried taking tylenol for her headache with some relief. She reported a hx of a minor headache, but this headache was worse than usual for her. The patient also reported weakness x 2 days causing a few falls at home. She denied any LOC. She reported her blood glucose has been elevated over the past 5 days, more so than usual. In the ED, a stat head CT was performed which showed SAH in the left sylvian fissure and basal cisterns. 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 She was transferred to [facility] for higher level of care. She is not a smoker and reports her uncle  from a cerebral aneurysm. Subjective:     Patient is doing well today. She states that she slept very well last night and feels that as a result she is in a \"groggy fog\" this morning. She is having some mild headache which is treated with tylenol. No acute neurological changes overnight. TCDs do show some mildly elevated velocities in right NGUYEN indicating possible vasospasm. Patient however with no clinical changes in neuro exam at this time. Will continue to monitor closely. She denies any vision changes, dizziness, vomiting, abdominal pain, chest pain, SOB, numbness, tingling, or weakness.      Current Facility-Administered Medications   Medication Dose Route Frequency Provider Last Rate Last Dose    hydrALAZINE (APRESOLINE) 20 mg/mL injection 10 mg  10 mg IntraVENous Q1H PRN Billy Began, NP   10 mg at 07/23/19 1251    labetalol (NORMODYNE;TRANDATE) 20 mg/4 mL (5 mg/mL) injection 10 mg  10 mg IntraVENous Q1H PRN Billy Began, NP        insulin glargine (LANTUS) injection 18 Units  18 Units SubCUTAneous BID Sheldon Kothari MD        metoprolol (LOPRESSOR) injection 5 mg  5 mg IntraVENous Q15MIN PRN Billy Began, NP   5 mg at 07/23/19 1501    metoprolol tartrate (LOPRESSOR) tablet 12.5 mg  12.5 mg Oral BID Valentina Gaitan MD   12.5 mg at 07/23/19 0813    lactated Ringers infusion  125 mL/hr IntraVENous CONTINUOUS Buddy Goncalves  mL/hr at 07/23/19 0724 125 mL/hr at 07/23/19 0724    insulin lispro (HUMALOG) injection   SubCUTAneous AC&HS Darron Newsome MD   2 Units at 07/23/19 1245    pantoprazole (PROTONIX) tablet 40 mg  40 mg Oral ACB Darron Newsome MD   40 mg at 07/23/19 0716    levETIRAcetam (KEPPRA) tablet 500 mg  500 mg Oral Q12H Sheldon Kothari MD   500 mg at 07/23/19 0814    polyethylene glycol (MIRALAX) packet 17 g  17 g Oral DAILY PRN Buddy Goncalves, NP        insulin lispro (HUMALOG) injection 5 Units  5 Units SubCUTAneous Jeanette Ballesteros MD   5 Units at 07/23/19 1226    docusate sodium (COLACE) capsule 100 mg  100 mg Oral DAILY Buddy Goncalves NP   100 mg at 07/23/19 0813    niCARdipine (CARDENE) 25 mg in 0.9% sodium chloride 250 mL infusion  0-15 mg/hr IntraVENous TITRATE Tadeo Grove MD   Stopped at 07/22/19 0516    ondansetron (ZOFRAN) injection 4 mg  4 mg IntraVENous Q6H PRN Rancho Mojica MD   4 mg at 07/23/19 1301    naloxone (NARCAN) injection 0.4 mg  0.4 mg IntraVENous PRN Rancho Mojica MD        acetaminophen (TYLENOL) tablet 650 mg  650 mg Oral Q4H PRN Rancho Mojica MD   650 mg at 07/23/19 1251    Or    acetaminophen (TYLENOL) solution 650 mg  650 mg Per NG tube Q4H PRN Rancho Mojica MD        Or    acetaminophen (TYLENOL) suppository 650 mg  650 mg Rectal Q4H PRN Rodney Phoenix MD        glucose chewable tablet 16 g  4 Tab Oral PRN Rodney Phoenix MD        dextrose (D50W) injection syrg 12.5-25 g  25-50 mL IntraVENous PRN Rodney Phoenix MD        glucagon (GLUCAGEN) injection 1 mg  1 mg IntraMUSCular PRN Rodney Phoenix MD        niMODipine (NIMOTOP) capsule 60 mg  60 mg Oral Q4H Aiyana Arroyo NP   60 mg at 19 1226        No Known Allergies    Review of Systems:  Pertinent items are noted in the History of Present Illness.     Objective:     Vital signs  Temp (24hrs), Av.6 °F (37 °C), Min:97.6 °F (36.4 °C), Max:99.2 °F (37.3 °C)   701 - 1900  In: 625 [I.V.:625]  Out: 1700 [Urine:1700]  1901 -  0700  In: 5194.2 [P.O.:680; I.V.:4514.2]  Out: 2500 [Urine:2500]    Visit Vitals  /59   Pulse 65   Temp 98.9 °F (37.2 °C)   Resp 16   Ht 5' 4\" (1.626 m) Comment: From documentation on 19   Wt 218 lb 14.7 oz (99.3 kg)   SpO2 95%   BMI 37.58 kg/m²    O2 Flow Rate (L/min): 2 l/min O2 Device: Room air     Pain control  Pain Assessment  Pain Scale 1: Numeric (0 - 10)  Pain Intensity 1: 0  Pain Intervention(s) 1: Medication (see MAR), Distraction, Emotional support, Relaxation technique, Repositioned, Therapeutic presence, Therapeutic touch    PT/OT  Gait     Gait  Base of Support: Widened  Speed/Maira: Slow  Step Length: Right shortened, Left shortened  Gait Abnormalities: Trunk sway increased  Ambulation - Level of Assistance: Contact guard assistance  Distance (ft): 5 Feet (ft)  Assistive Device: Gait belt        Vitals:    19 1200 19 1230 19 1300 19 1330   BP: 149/76 165/79 (!) 166/133 128/59   Pulse: 70 89 (!) 105 65   Resp: 18 21 24 16   Temp: 98.9 °F (37.2 °C)      SpO2: 95% 95% 95% 95%   Weight:       Height:            Physical Exam:  GENERAL: alert, cooperative, no distress, appears stated age  LUNG: clear to auscultation bilaterally  HEART: regular rate and rhythm, S1, S2 normal, no murmur, click, rub or gallop  EXTREMITIES:  extremities normal, atraumatic, no cyanosis, mild non-pitting edema in bilateral hands. 2+ distal pulses bilaterally. SKIN: no rash or abnormalities. Skin warm to touch. Right groin site clean, dry, and intact. No hematoma, bruising, or bleeding at site. Neurologic Exam:  Mental Status:  Alert and oriented x 4. Appropriate affect, mood and behavior. Language:    Normal fluency, repetition, comprehension and naming. Cranial Nerves:        Pupils equal, round and reactive to light. Visual fields full to confrontation. Extraocular movements intact. Facial sensation intact. Full facial strength, no asymmetry. Hearing grossly intact bilaterally. No dysarthria. Tongue protrudes to midline, palate elevates symmetrically. Shoulder shrug 5/5 bilaterally. Motor:    No pronator drift. Bulk and tone normal.      5/5 power in all extremities proximally and distally. No involuntary movements. Sensation:    Sensation intact throughout to light touch. Coordination & Gait: No ataxia with FTN and HTS testing. Gait deferred.      24 hour results:    Recent Results (from the past 24 hour(s))   GLUCOSE, POC    Collection Time: 07/22/19  4:32 PM   Result Value Ref Range    Glucose (POC) 221 (H) 65 - 100 mg/dL    Performed by Zoltan Ennis, POC    Collection Time: 07/22/19 10:51 PM   Result Value Ref Range    Glucose (POC) 215 (H) 65 - 100 mg/dL    Performed by JAMES ROLLE    GLUCOSE, POC    Collection Time: 07/23/19  7:08 AM   Result Value Ref Range    Glucose (POC) 216 (H) 65 - 100 mg/dL    Performed by JAMES ROLLE    TCD INTRACRANIAL ARTERIES COMPLETE    Collection Time: 07/23/19 11:13 AM   Result Value Ref Range    Right MCA 1  cm/s    Right MCA 1 EDV 33 cm/s    Right MCA 1 Mean Velocity 58 cm/s    Right NGUYEN  cm/s    Right NGUYEN  cm/s    Right NGUYEN Mean Velocity 172 cm/s    Right ICA  cm/s Right ICA EDV 42 cm/s    Right ICA Mean Velocity 71 cm/s    Right PCA 1  cm/s    Right PCA 1 EDV 38 cm/s    Right PCA 1 Mean Velocity 66 cm/s    Right External ICA PSV 61 cm/s    Right External ICA EDV 23 cm/s    Right External ICA Mean Velocity 36 cm/s    Left MCA 1 PSV 90 cm/s    Left MCA 1 EDV 40 cm/s    Left MCA 1 Mean Velocity 57 cm/s    Left PCA 1  cm/s    Left PCA 1 EDV 50 cm/s    Left PCA 1 Mean Velocity 83 cm/s    Basilar Artery  cm/s    Basilar Artery EDV 42 cm/s    Basilar Artery Mean Mesfin 71 cm/s    Left Vertebral Mean Velocity 80 cm/s    Left Vertebral  cm/s    Left Vertebral EDV 53 cm/s    Right Lindegaard Ratio 1.6     Right NGUYEN Lindegaard Ratio 4.7    GLUCOSE, POC    Collection Time: 07/23/19 12:34 PM   Result Value Ref Range    Glucose (POC) 182 (H) 65 - 100 mg/dL    Performed by [facility]           Imaging:  CT of Head on 7/19/2019 shows  IMPRESSION:   Acute subarachnoid hemorrhage in the left sylvian fissure and basal cisterns. No  evidence for acute infarct. CTA of the Head and Neck on 7/19/2019 shows  IMPRESSION:  Severe atherosclerotic change in the cervical internal carotid artery. Greater  than 90% stenosis of the proximal left internal carotid artery.     Moderate stenosis right internal carotid artery approximately 50%.     No intracranial aneurysm. Minimal irregularity of M2 segments may be related to  mild vasospasm. Stable degree of subarachnoid hemorrhage when compared to the CT  of the head performed earlier in the day. CT of Head on 7/20/2019 shows  IMPRESSION:  1. Stable small to moderate volume subarachnoid hemorrhage as described above,  largest components in the suprasellar cistern, prepontine cistern, and left sylvian fissure. CT of Head on 7/22/2019 shows  IMPRESSION  IMPRESSION: Decreased subarachnoid hemorrhage. No new intracranial hemorrhage.       Assessment:     Principal Problem:    SAH (subarachnoid hemorrhage) ([address] (7/20/2019)        Plan:   SAH, Savage Mcdonald 1, Perez Grade 2, PBD day 8   - as seen on CT/CTA, no aneurysm seen on CTA   - neuro exam non-focal   - s/p cerebral angiogram on 7/20 which did not show an obvious aneurysm to explain blood pattern, 1 mm x 1 mm triangular outpouching on distal right NGUYEN trunk; ? Infundibulum, tiny aneurysm seen on angiogram, patient will need a repeat angiogram in one week. - Repeat Head CT 7/22, shows a decrease in [facility]   - most likely a perimesencephalic SAH              - Continue Nimotop day 5 of 21 for prevention of delayed cerebral ischemia               - Continue Keppra 500 BID for seizure ppx              - LR at 125 ml/hr to maintain euvolemia   - continue TCD's daily               - Strict I&O's              - TTE shows EF 56-60%, moderate pulmonary HTN, trace mitral valve regurgitation, trace tricuspid valve regurgitation               - every 2 hour neuro checks during the day and every 4 hours at night               - SBP goal less than 140, Cardene PRN    Hx of HTN   - SBP goal <140   - Cardene PRN, hydralazine/Labetalol PRN   - Hospitalist following    DM type 1   - sliding scale insulin and point of care glucose checks   - Management per hospitalist       Plan d/w Dr. Neha Stanford, ICU RN, patient. Continue to monitor in ICU.       Rhett Palmer NP

## 2019-07-23 NOTE — PROGRESS NOTES
Problem: Self Care Deficits Care Plan (Adult)  Goal: *Acute Goals and Plan of Care (Insert Text)  Description  Occupational Therapy Goals  Initiated 7/23/2019  1. Patient will perform grooming standing at sink for 10 minutes with no LOB with supervision/set-up within 7 day(s). 2.  Patient will perform upper body dressing with independence within 7 day(s). 3.  Patient will perform upper body dressing with supervision/set-up within 7 day(s). 4.  Patient will perform toilet transfers with supervision/set-up within 7 day(s). 5.  Patient will perform all aspects of toileting with supervision/set-up within 7 day(s). 6.  Patient will participate in upper extremity therapeutic exercise/activities with independence for 5 minutes within 7 day(s). 7.  Patient will utilize energy conservation techniques during functional activities with verbal cues within 7 day(s). Outcome: Not Met    OCCUPATIONAL THERAPY EVALUATION  Patient: Joe Louis (28 y.o. female)  Date: 7/23/2019  Primary Diagnosis: ICH (intracerebral hemorrhage) ([facility] [I61.9]        Precautions: falls       ASSESSMENT :  Based on the objective data described below, the patient presents with overall min A/CGA for functional mobility, setup-IND for upper body ADLs, and mod-total A for lower body ADLs s/p admission for [facility]. Patient reports being IND with ADLs and functional mobility PTA, living with . Today, patient ADLs limited by impaired balance, decreased functional activity tolerance, generalized debility, anxiety/fear of falling and impaired reach to feet. Patient scored a 66/66 on the [facility] assessment, indicating no deficits. Anticipate patient will have no OT needs once medically stable. Will continue to follow.      Recommend with nursing patient to complete as able in order to maintain strength, endurance and independence: ADLs with supervision/setup, OOB to chair 3x/day and mobilizing to the bathroom for toileting with 1-2 assist. Thank you for your assistance. Patient will benefit from skilled intervention to address the above impairments. Patients rehabilitation potential is considered to be Good  Factors which may influence rehabilitation potential include:   ? None noted  ? Mental ability/status  ? Medical condition  ? Home/family situation and support systems  ? Safety awareness  ? Pain tolerance/management  ? Other:      PLAN :  Recommendations and Planned Interventions:  ?               Self Care Training                  ? Therapeutic Activities  ? Functional Mobility Training    ? Cognitive Retraining  ? Therapeutic Exercises           ? Endurance Activities  ? Balance Training                   ? Neuromuscular Re-Education  ? Visual/Perceptual Training     ? Home Safety Training  ? Patient Education                 ? Family Training/Education  ? Other (comment):    Frequency/Duration: Patient will be followed by occupational therapy 5 times a week to address goals. Discharge Recommendations: None  Further Equipment Recommendations for Discharge: none      SUBJECTIVE:   Patient stated I have been trying to force myself to eat, I just haven't been hungry.     OBJECTIVE DATA SUMMARY:   HISTORY:   Past Medical History:   Diagnosis Date    ARF (acute renal failure) ([facility]     2/11/13 - had a viral gastroenteritis     Diabetes ([facility]     Duodenal ulcer disease     EGD 2/13 - aspirin stopped afterwards    Hypertension     Leg edema     PAF (paroxysmal atrial fibrillation) ([facility])     1 hour epsiode 2/11/13     Past Surgical History:   Procedure Laterality Date    HX OTHER SURGICAL      Echo 2/11/13 - mild LVH, EF 60%       Prior Level of Function/Environment/Context: Patient lives in 2 level home with  and was IND with ADLs and functional mobility PTA. No use of DME at baseline. Home Situation  Home Environment: Private residence  # Steps to Enter: 4  Rails to Enter: Yes  One/Two Story Residence: Two story  # of Interior Steps: 14  Living Alone: No  Support Systems: Spouse/Significant Other/Partner  Patient Expects to be Discharged to[de-identified] Private residence  Current DME Used/Available at Home: None  Tub or Shower Type: Tub/Shower combination    Hand dominance: Right    EXAMINATION OF PERFORMANCE DEFICITS:  Cognitive/Behavioral Status:  Neurologic State: Alert  Orientation Level: Oriented X4  Cognition: Appropriate for age attention/concentration; Follows commands  Perception: Appears intact  Perseveration: No perseveration noted  Safety/Judgement: Awareness of environment; Insight into deficits    Skin: Appears grossly intact    Edema: none noted in BUEs    Hearing:       Vision/Perceptual:    Tracking: Able to track stimulus in all quadrants w/o difficulty    Diplopia: No    Acuity: Within Defined Limits    Corrective Lenses: Glasses    Range of Motion:  In BUEs  AROM: Within functional limits  PROM: Within functional limits    Strength: In BUEs  Strength: Within functional limits                Coordination:  Coordination: Within functional limits  Fine Motor Skills-Upper: Left Intact; Right Intact    Gross Motor Skills-Upper: Left Intact; Right Intact    Tone & Sensation:  In BUEs  Tone: Normal  Sensation: Intact       Balance:  Sitting: Intact  Standing: Impaired(per PT report)  Standing - Static: Good  Standing - Dynamic : Fair    Functional Mobility and Transfers for ADLs:  Bed Mobility:  Session completed in chair    Transfers:  Session completed in chair    ADL Assessment:  Feeding: Independent    Oral Facial Hygiene/Grooming: Independent(Infer per obs of func reach, BUE ROM, coordination)    Bathing: Minimum assistance(Infer setup upper and min A lower 2* balance)    Upper Body Dressing: Setup(Infer per obs of func reach, BUE ROM, coordination)    Lower Body Dressing: Total assistance(unable to reach to feet)    Toileting: Minimum assistance(Infer per obs of func reach, BUE ROM, strength, balance)    ADL Intervention and task modifications:  Feeding  Feeding Assistance: Independent  Container Management: Independent  Cutting Food: Independent  Utensil Management: Independent  Food to Mouth: Independent  Drink to Mouth: Independent    Lower Body Dressing Assistance  Socks: Total assistance (dependent)  Leg Crossed Method Used: No  Position Performed: Seated in chair  Cues: Doff;Physical assistance;Verbal cues provided    Cognitive Retraining  Safety/Judgement: Awareness of environment; Insight into deficits      Functional Measure:  Fugl-Claros Assessment of Motor Recovery after Stroke:     Reflex Activity  Flexors/Biceps/Fingers: Can be elicited  Extensors/Triceps: Can be elicited  Reflex Subtotal: 4    Volitional Movement Within Synergies  Shoulder Retraction: Full  Shoulder Elevation: Full  Shoulder Abduction (90 degrees): Full  Shoulder External Rotation: Full  Elbow Flexion: Full  Forearm Supination: Full  Shoulder Adduction/Internal Rotation: Full  Elbow Extension: Full  Forearm Pronation: Full  Subtotal: 18    Volitional Movement Mixing Synergies  Hand to Lumbar Spine: Full  Shoulder Flexion (0-90 degrees): Full  Pronation-Supination: Full  Subtotal: 6    Volitional Movement With Little or No Synergy  Shoulder Abduction (0-90 degrees): Full  Shoulder Flexion ( degrees): Full  Pronation/Supination: Full  Subtotal : 6    Normal Reflex Activity  Biceps, Triceps, Finger Flexors:  Full  Subtotal : 2    Upper Extremity Total   Upper Extremity Total: 36    Wrist  Stability at 15 Degree Dorsiflexion: Full  Repeated Dorsiflexion/ Volar Flexion: Full  Stability at 15 Degree Dorsiflexion: Full  Repeated Dorsiflexion/ Volar Flexion: Full  Circumduction: Full  Wrist Total: 10    Hand  Mass Flexion: Full  Mass Extension: Full  Grasp A: Full  Grasp B: Full  Grasp C: Full  Grasp D: Full  Grasp E: Full  Hand Total: 14    Coordination/Speed  Tremor: None  Dysmetria: None  Time: <1s  Coordination/Speed Total : 6    Total A-D  Total A-D (Motor Function): 66/66         This is a reliable/valid measure of arm function after a neurological event. It has established value to characterize functional status and for measuring spontaneous and therapy-induced recovery; tests proximal and distal motor functions. Fugl-Claros Assessment - UE scores recorded between five and 30 days post neurologic event can be used to predict UE recovery at six months post neurologic event. Severe = 0-21 points   Moderately Severe = 22-33 points   Moderate = 34-47 points   Mild = 48-66 points  SUDHIR Castaneda, LYNSEY Villarreal, & GENA Brenner (1992). Measurement of motor recovery after stroke: Outcome assessment and sample size requirements.  Stroke, 23, pp. 2958-5593.   --------------------------------------------------------------------------------------------------------------------------------------------------------------------  MCID:  Stroke:   Konstantin Umana et al, 2001; n = 171; mean age 79 (6) years; assessed within 16 (12) days of stroke, Acute Stroke)  FMA Motor Scores from Admission to Discharge   10 point increase in FMA Upper Extremity = 1.5 change in discharge FIM   10 point increase in FMA Lower Extremity = 1.9 change in discharge FIM  MDC:   Stroke:   Kareem Casiano et al, 2008, n = 14, mean age = 59.9 (14.6) years, assessed on average 14 (6.5) months post stroke, Chronic Stroke)   FMA = 5.2 points for the Upper Extremity portion of the assessment         Occupational Therapy Evaluation Charge Determination   History Examination Decision-Making   LOW Complexity : Brief history review  LOW Complexity : 1-3 performance deficits relating to physical, cognitive , or psychosocial skils that result in activity limitations and / or participation restrictions  MEDIUM Complexity : Patient may present with comorbidities that affect occupational performnce. Miniml to moderate modification of tasks or assistance (eg, physical or verbal ) with assesment(s) is necessary to enable patient to complete evaluation       Based on the above components, the patient evaluation is determined to be of the following complexity level: LOW   Pain:  Pain Scale 1: Numeric (0 - 10)  Pain Intensity 1: 0  Pain Intervention(s) 1: Medication (see MAR); Distraction; Emotional support;Relaxation technique;Repositioned; Therapeutic presence; Therapeutic touch    Activity Tolerance:   Good, VSS  Please refer to the flowsheet for vital signs taken during this treatment. After treatment:   ? Patient left in no apparent distress sitting up in chair  ? Patient left in no apparent distress in bed  ? Call bell left within reach  ? Nursing notified  ? Caregiver present  ? Bed alarm activated    COMMUNICATION/EDUCATION:   The patients plan of care was discussed with: Physical Therapist and Registered Nurse.  ? Home safety education was provided and the patient/caregiver indicated understanding. ? Patient/family have participated as able in goal setting and plan of care. ? Patient/family agree to work toward stated goals and plan of care. ? Patient understands intent and goals of therapy, but is neutral about his/her participation. ? Patient is unable to participate in goal setting and plan of care. This patients plan of care is appropriate for delegation to [facility].     Thank you for this referral.  Coco Ventura OT  Time Calculation: 10 mins

## 2019-07-23 NOTE — PROGRESS NOTES
Hospitalist Progress Note    NAME: Dana Lucas   :  1950   MRN:  [id number]       Assessment / Plan:  1.  Acute subarachnoid hemorrhage  -S/P cerebral angiogram, no obvious aneurysm   - Optimize BP control, currently on cardene gtt, keep SBP less than 140  - Continue Nimodipine  - continue keppra   - PT eval pending  - CT head with \" Stable small to moderate volume subarachnoid hemorrhage \"  - neurochecks, seizure precaution  - appreciate NS and BERRY input  CT shows stable SAH   2.  Acute kidney injury,stable   - likely prerenal due to recent GI loss  - improving  - continue IV hydration  - avoid nephrotoxic meds     3.  Diabetes mellitus type 2,with hyperglycemia   - better controlled  - continue SSNI, accuchecks, monitor   Resume lower dose of Lantus, she is eating better, required SSI,  Increased Lantus 18 BID         4.  Accelerated hypertension.  -tight BP control to keep SBP less than 140  -  Cardene gtt off   - monitor   - 5. Afib: BB no anticoagulation,   Code status: full  DVT prophylaxis: SCD     Care Plan discussed with: Patient/Family  Disposition: Home w/Family  Subjective:     Chief Complaint / Reason for Physician Visit  \" feeling ok less swelling \". Discussed with RN events overnight. Review of Systems:  Symptom Y/N Comments  Symptom Y/N Comments   Fever/Chills    Chest Pain     Poor Appetite    Edema     Cough    Abdominal Pain     Sputum    Joint Pain     SOB/JAMES    Pruritis/Rash     Nausea/vomit    Tolerating PT/OT     Diarrhea    Tolerating Diet     Constipation    Other       Could NOT obtain due to:      Objective:     VITALS:   Last 24hrs VS reviewed since prior progress note.  Most recent are:  Patient Vitals for the past 24 hrs:   Temp Pulse Resp BP SpO2   19 0900  68 17 128/60 96 %   19 0830  68 18 126/56 95 %   19 0800 97.6 °F (36.4 °C) 67 17 117/49 94 %   19 0630  60 14 134/58 96 %   19 0600  66 14 121/61 95 %   19 0530  67 14 112/58 95 %   07/23/19 0500  65 15 125/53 95 %   07/23/19 0430  68 14 118/55 95 %   07/23/19 0400 98 °F (36.7 °C) 67 15 110/55 95 %   07/23/19 0330  66 15 114/53 95 %   07/23/19 0300  75 16 113/53 95 %   07/23/19 0230  74 18 114/54 95 %   07/23/19 0200  90 19 (!) 113/98 95 %   07/23/19 0130  63 16  95 %   07/23/19 0100  98 15 158/75 95 %   07/23/19 0030  97 16 149/78 95 %   07/23/19 0000 99.2 °F (37.3 °C) 88 16  95 %   07/22/19 2330  (!) 103 19 146/73 95 %   07/22/19 2300  (!) 120 22 (!) 151/95 96 %   07/22/19 2230  (!) 101 15 (!) 121/91 94 %   07/22/19 2200  (!) 105 18 115/68 95 %   07/22/19 2130  (!) 123 18 116/84 93 %   07/22/19 2100  (!) 122 21 (!) 128/92 94 %   07/22/19 2030  (!) 115 22 147/86 97 %   07/22/19 2000 99.2 °F (37.3 °C) (!) 113 21 157/79 97 %   07/22/19 1900  (!) 108 22 (!) 151/100 96 %   07/22/19 1830   22 137/89 96 %   07/22/19 1730  95 15 135/76 95 %   07/22/19 1700  84 17 128/74 94 %   07/22/19 1630  (!) 113 18 (!) 130/92 96 %   07/22/19 1600 98.9 °F (37.2 °C)       07/22/19 1530  (!) 141 16 135/61 96 %   07/22/19 1500  (!) 128 16 142/89 95 %       Intake/Output Summary (Last 24 hours) at 7/23/2019 1227  Last data filed at 7/23/2019 0900  Gross per 24 hour   Intake 2725 ml   Output 1350 ml   Net 1375 ml        PHYSICAL EXAM:  General: WD, WN. Alert, cooperative, no acute distress    EENT:  EOMI. Anicteric sclerae. MMM  Resp:  CTA bilaterally, no wheezing or rales. No accessory muscle use  CV:  Regular  rhythm,  No edema  GI:  Soft, Non distended, Non tender.  +Bowel sounds  Neurologic:  Alert and oriented X 3, normal speech,   Psych:   Good insight. Not anxious nor agitated  Skin:  No rashes.   No jaundice    Reviewed most current lab test results and cultures  YES  Reviewed most current radiology test results   YES  Review and summation of old records today    NO  Reviewed patient's current orders and MAR    YES  PMH/SH reviewed - no change compared to H&P  ________________________________________________________________________  Care Plan discussed with:    Comments   Patient     Family      RN     Care Manager     Consultant                        Multidiciplinary team rounds were held today with , nursing, pharmacist and clinical coordinator. Patient's plan of care was discussed; medications were reviewed and discharge planning was addressed. ________________________________________________________________________  Total NON critical care TIME: 35 Minutes    Total CRITICAL CARE TIME Spent:   Minutes non procedure based      Comments   >50% of visit spent in counseling and coordination of care     ________________________________________________________________________  Leon Valadez MD     Procedures: see electronic medical records for all procedures/Xrays and details which were not copied into this note but were reviewed prior to creation of Plan. LABS:  I reviewed today's most current labs and imaging studies. Pertinent labs include:  No results for input(s): WBC, HGB, HCT, PLT, HGBEXT, HCTEXT, PLTEXT, HGBEXT, HCTEXT, PLTEXT in the last 72 hours.   Recent Labs     07/22/19  0429 07/21/19  0557    142   K 3.6 3.8   * 110*   CO2 23 26   * 215*   BUN 20 28*   CREA 1.03* 1.17*   CA 8.9 8.3*   ALB 3.0* 2.6*   TBILI 0.8 0.6   SGOT 13* 13*   ALT 20 17       Signed: Leon Valadez MD

## 2019-07-23 NOTE — PROGRESS NOTES
Called by nurse re a.fib  Still in and out of a.fib  BP mostly very well controlled  Rate when in a.fib mostly 100-110 which is adequate control. Some spikes up to 150s but these are brief    Since she cannot be anticoagulated, there are no options for rhythm control - ie we cannot cardiovert her   We will increase her metoprolol to 25mg bid.

## 2019-07-23 NOTE — PROGRESS NOTES
Orders received, chart reviewed and patient evaluated by occupational therapy. Recommend patient to discharge home with increased assist from family pending progression with skilled acute occupational therapy. Recommend with nursing patient to complete as able in order to maintain strength, endurance and independence: OOB to chair 3x/day, ADLs with supervision/setup and mobilizing to the [facility] for toileting with 2 assist. Thank you for your assistance. Full evaluation to follow.

## 2019-07-23 NOTE — DIABETES MGMT
[facility]    [facility] Progress Note    Recommendations/ Comments: Chart reviewed due to hyperglycemia. BG consistently above 200 mg/dl. Noted Lantus was increased to 18 units. If appropriate, please consider:   - increasing pre-meal Lispro to 7 units tid ac (currently 5 units tid ac)    Current hospital DM medication: Lantus, 18 units daily  AC lispro, 5 units TID  Lispro, normal sensitivity correction scale     Chart reviewed on [facility]. Patient is a 80 y.o. female with known DM on NPH and Lispro sliding scale at home. Dose unknown. A1c:   Lab Results   Component Value Date/Time    Hemoglobin A1c 8.9 (H) 07/21/2019 08:00 AM    Hemoglobin A1c 8.2 (H) 02/10/2013 03:19 AM       Recent Glucose Results:   Lab Results   Component Value Date/Time    GLUCPOC 216 (H) 07/23/2019 07:08 AM    GLUCPOC 215 (H) 07/22/2019 10:51 PM    GLUCPOC 221 (H) 07/22/2019 04:32 PM        Lab Results   Component Value Date/Time    Creatinine 1.03 (H) 07/22/2019 04:29 AM     Estimated Creatinine Clearance: 60.7 mL/min (A) (based on SCr of 1.03 mg/dL (H)). Active Orders   Diet    DIET DIABETIC CONSISTENT CARB Regular        PO intake:   Patient Vitals for the past 72 hrs:   % Diet Eaten   07/21/19 1000 75 %   07/20/19 1900 100 %       Will continue to follow as needed.     Thank you  Nas Jennings, MS, RN, CDE    Time spent: 5 minutes

## 2019-07-23 NOTE — ROUTINE PROCESS
1930 Report received from [address] [place] [address] Tommy using SBAR format  0730 Report given to Colleen Dominguez RN using SBAR format

## 2019-07-23 NOTE — PROGRESS NOTES
PCCM    D/o multi-D rounds  In ICU close monitoring  On LR hydration; [facility] for [facility] 5/21  Monitoring TCDs  Goal SBP <140-- savita

## 2019-07-24 ENCOUNTER — APPOINTMENT (OUTPATIENT)
Dept: VASCULAR SURGERY | Age: 69
DRG: 065 | End: 2019-07-24
Attending: NURSE PRACTITIONER
Payer: COMMERCIAL

## 2019-07-24 LAB
ANION GAP SERPL CALC-SCNC: 8 MMOL/L (ref 5–15)
BASILAR ARTERY EDV: 42 CM/S
BASILAR ARTERY MEAN VEL: 71 CM/S
BASILAR ARTERY PSV: 129 CM/S
BASOPHILS # BLD: 0 K/UL (ref 0–0.1)
BASOPHILS NFR BLD: 0 % (ref 0–1)
BUN SERPL-MCNC: 23 MG/DL (ref 6–20)
BUN/CREAT SERPL: 19 (ref 12–20)
CALCIUM SERPL-MCNC: 9 MG/DL (ref 8.5–10.1)
CHLORIDE SERPL-SCNC: 107 MMOL/L (ref 97–108)
CO2 SERPL-SCNC: 26 MMOL/L (ref 21–32)
CREAT SERPL-MCNC: 1.24 MG/DL (ref 0.55–1.02)
DIFFERENTIAL METHOD BLD: ABNORMAL
EOSINOPHIL # BLD: 0 K/UL (ref 0–0.4)
EOSINOPHIL NFR BLD: 1 % (ref 0–7)
ERYTHROCYTE [DISTWIDTH] IN BLOOD BY AUTOMATED COUNT: 14 % (ref 11.5–14.5)
GLUCOSE BLD STRIP.AUTO-MCNC: 185 MG/DL (ref 65–100)
GLUCOSE BLD STRIP.AUTO-MCNC: 205 MG/DL (ref 65–100)
GLUCOSE BLD STRIP.AUTO-MCNC: 225 MG/DL (ref 65–100)
GLUCOSE BLD STRIP.AUTO-MCNC: 243 MG/DL (ref 65–100)
GLUCOSE SERPL-MCNC: 238 MG/DL (ref 65–100)
HCT VFR BLD AUTO: 36.3 % (ref 35–47)
HGB BLD-MCNC: 11.5 G/DL (ref 11.5–16)
IMM GRANULOCYTES # BLD AUTO: 0.1 K/UL (ref 0–0.04)
IMM GRANULOCYTES NFR BLD AUTO: 1 % (ref 0–0.5)
LEFT MCA 1 EDV: 40 CM/S
LEFT MCA 1 MEAN VEL: 57 CM/S
LEFT MCA 1 PSV: 90 CM/S
LEFT PCA 1 EDV: 50 CM/S
LEFT PCA 1 MEAN VEL: 83 CM/S
LEFT PCA 1 PSV: 148 CM/S
LEFT VERTEBRAL EDV TCD: 53 CM/S
LEFT VERTEBRAL MEAN VEL: 80 CM/S
LEFT VERTEBRAL PSV TCD: 135 CM/S
LYMPHOCYTES # BLD: 1.5 K/UL (ref 0.8–3.5)
LYMPHOCYTES NFR BLD: 17 % (ref 12–49)
MCH RBC QN AUTO: 29.9 PG (ref 26–34)
MCHC RBC AUTO-ENTMCNC: 31.7 G/DL (ref 30–36.5)
MCV RBC AUTO: 94.5 FL (ref 80–99)
MONOCYTES # BLD: 0.7 K/UL (ref 0–1)
MONOCYTES NFR BLD: 8 % (ref 5–13)
NEUTS SEG # BLD: 6.5 K/UL (ref 1.8–8)
NEUTS SEG NFR BLD: 73 % (ref 32–75)
NRBC # BLD: 0 K/UL (ref 0–0.01)
NRBC BLD-RTO: 0 PER 100 WBC
PLATELET # BLD AUTO: 216 K/UL (ref 150–400)
PMV BLD AUTO: 10.4 FL (ref 8.9–12.9)
POTASSIUM SERPL-SCNC: 3.8 MMOL/L (ref 3.5–5.1)
RBC # BLD AUTO: 3.84 M/UL (ref 3.8–5.2)
RIGHT ACA EDV: 124 CM/S
RIGHT ACA LINDEGAARD RATIO: 4.7
RIGHT ACA MEAN VEL: 172 CM/S
RIGHT ACA PSV: 269 CM/S
RIGHT EX ICA EDV: 23 CM/S
RIGHT EX ICA MEAN VEL: 36 CM/S
RIGHT EX ICA PSV: 61 CM/S
RIGHT ICA EDV: 42 CM/S
RIGHT ICA MEAN VEL: 71 CM/S
RIGHT ICA PSV: 129 CM/S
RIGHT LINDEGAARD RATIO: 1.6
RIGHT MCA 1 EDV: 33 CM/S
RIGHT MCA 1 MEAN VEL: 58 CM/S
RIGHT MCA 1 PSV: 109 CM/S
RIGHT PCA 1 EDV: 38 CM/S
RIGHT PCA 1 MEAN VEL: 66 CM/S
RIGHT PCA 1 PSV: 121 CM/S
SERVICE CMNT-IMP: ABNORMAL
SODIUM SERPL-SCNC: 141 MMOL/L (ref 136–145)
WBC # BLD AUTO: 8.7 K/UL (ref 3.6–11)

## 2019-07-24 PROCEDURE — 74011250637 HC RX REV CODE- 250/637: Performed by: FAMILY MEDICINE

## 2019-07-24 PROCEDURE — 74011250637 HC RX REV CODE- 250/637: Performed by: NURSE PRACTITIONER

## 2019-07-24 PROCEDURE — 74011636637 HC RX REV CODE- 636/637: Performed by: NURSE PRACTITIONER

## 2019-07-24 PROCEDURE — 74011250637 HC RX REV CODE- 250/637: Performed by: INTERNAL MEDICINE

## 2019-07-24 PROCEDURE — 80048 BASIC METABOLIC PNL TOTAL CA: CPT

## 2019-07-24 PROCEDURE — 74011636637 HC RX REV CODE- 636/637: Performed by: INTERNAL MEDICINE

## 2019-07-24 PROCEDURE — 97116 GAIT TRAINING THERAPY: CPT

## 2019-07-24 PROCEDURE — 85025 COMPLETE CBC W/AUTO DIFF WBC: CPT

## 2019-07-24 PROCEDURE — 74011250636 HC RX REV CODE- 250/636: Performed by: RADIOLOGY

## 2019-07-24 PROCEDURE — 74011000250 HC RX REV CODE- 250: Performed by: RADIOLOGY

## 2019-07-24 PROCEDURE — 36415 COLL VENOUS BLD VENIPUNCTURE: CPT

## 2019-07-24 PROCEDURE — 65610000006 HC RM INTENSIVE CARE

## 2019-07-24 PROCEDURE — 97535 SELF CARE MNGMENT TRAINING: CPT

## 2019-07-24 PROCEDURE — 74011250636 HC RX REV CODE- 250/636: Performed by: NURSE PRACTITIONER

## 2019-07-24 PROCEDURE — 93886 INTRACRANIAL COMPLETE STUDY: CPT

## 2019-07-24 PROCEDURE — 74011636637 HC RX REV CODE- 636/637: Performed by: FAMILY MEDICINE

## 2019-07-24 PROCEDURE — 74011000250 HC RX REV CODE- 250: Performed by: NURSE PRACTITIONER

## 2019-07-24 PROCEDURE — 82962 GLUCOSE BLOOD TEST: CPT

## 2019-07-24 RX ORDER — SODIUM CHLORIDE 0.9 % (FLUSH) 0.9 %
SYRINGE (ML) INJECTION
Status: COMPLETED
Start: 2019-07-24 | End: 2019-07-24

## 2019-07-24 RX ORDER — INSULIN LISPRO 100 [IU]/ML
7 INJECTION, SOLUTION INTRAVENOUS; SUBCUTANEOUS
Status: DISCONTINUED | OUTPATIENT
Start: 2019-07-24 | End: 2019-07-25

## 2019-07-24 RX ORDER — LABETALOL HCL 20 MG/4 ML
20 SYRINGE (ML) INTRAVENOUS
Status: DISCONTINUED | OUTPATIENT
Start: 2019-07-24 | End: 2019-07-28

## 2019-07-24 RX ORDER — INSULIN GLARGINE 100 [IU]/ML
20 INJECTION, SOLUTION SUBCUTANEOUS 2 TIMES DAILY
Status: DISCONTINUED | OUTPATIENT
Start: 2019-07-24 | End: 2019-07-24

## 2019-07-24 RX ORDER — INSULIN GLARGINE 100 [IU]/ML
18 INJECTION, SOLUTION SUBCUTANEOUS 2 TIMES DAILY
Status: DISCONTINUED | OUTPATIENT
Start: 2019-07-24 | End: 2019-07-25

## 2019-07-24 RX ADMIN — SODIUM CHLORIDE, SODIUM LACTATE, POTASSIUM CHLORIDE, AND CALCIUM CHLORIDE 125 ML/HR: 600; 310; 30; 20 INJECTION, SOLUTION INTRAVENOUS at 21:01

## 2019-07-24 RX ADMIN — INSULIN LISPRO 5 UNITS: 100 INJECTION, SOLUTION INTRAVENOUS; SUBCUTANEOUS at 11:54

## 2019-07-24 RX ADMIN — METOPROLOL TARTRATE 5 MG: 5 INJECTION INTRAVENOUS at 16:31

## 2019-07-24 RX ADMIN — INSULIN LISPRO 2 UNITS: 100 INJECTION, SOLUTION INTRAVENOUS; SUBCUTANEOUS at 07:18

## 2019-07-24 RX ADMIN — INSULIN LISPRO 2 UNITS: 100 INJECTION, SOLUTION INTRAVENOUS; SUBCUTANEOUS at 21:25

## 2019-07-24 RX ADMIN — SODIUM CHLORIDE 5 MG/HR: 900 INJECTION, SOLUTION INTRAVENOUS at 03:32

## 2019-07-24 RX ADMIN — NIMODIPINE 60 MG: 30 CAPSULE, LIQUID FILLED ORAL at 16:28

## 2019-07-24 RX ADMIN — INSULIN LISPRO 5 UNITS: 100 INJECTION, SOLUTION INTRAVENOUS; SUBCUTANEOUS at 07:18

## 2019-07-24 RX ADMIN — NIMODIPINE 60 MG: 30 CAPSULE, LIQUID FILLED ORAL at 21:01

## 2019-07-24 RX ADMIN — Medication 10 ML: at 07:25

## 2019-07-24 RX ADMIN — ACETAMINOPHEN 650 MG: 325 TABLET ORAL at 08:17

## 2019-07-24 RX ADMIN — LEVETIRACETAM 500 MG: 500 TABLET, FILM COATED ORAL at 08:17

## 2019-07-24 RX ADMIN — INSULIN GLARGINE 18 UNITS: 100 INJECTION, SOLUTION SUBCUTANEOUS at 21:25

## 2019-07-24 RX ADMIN — NIMODIPINE 60 MG: 30 CAPSULE, LIQUID FILLED ORAL at 04:51

## 2019-07-24 RX ADMIN — NIMODIPINE 60 MG: 30 CAPSULE, LIQUID FILLED ORAL at 00:43

## 2019-07-24 RX ADMIN — INSULIN GLARGINE 18 UNITS: 100 INJECTION, SOLUTION SUBCUTANEOUS at 08:20

## 2019-07-24 RX ADMIN — LABETALOL 20 MG/4 ML (5 MG/ML) INTRAVENOUS SYRINGE 10 MG: at 00:43

## 2019-07-24 RX ADMIN — LEVETIRACETAM 500 MG: 500 TABLET, FILM COATED ORAL at 21:01

## 2019-07-24 RX ADMIN — METOPROLOL TARTRATE 25 MG: 25 TABLET ORAL at 08:18

## 2019-07-24 RX ADMIN — INSULIN LISPRO 3 UNITS: 100 INJECTION, SOLUTION INTRAVENOUS; SUBCUTANEOUS at 11:55

## 2019-07-24 RX ADMIN — LABETALOL 20 MG/4 ML (5 MG/ML) INTRAVENOUS SYRINGE 10 MG: at 02:03

## 2019-07-24 RX ADMIN — INSULIN LISPRO 7 UNITS: 100 INJECTION, SOLUTION INTRAVENOUS; SUBCUTANEOUS at 16:30

## 2019-07-24 RX ADMIN — ACETAMINOPHEN 650 MG: 325 TABLET ORAL at 16:29

## 2019-07-24 RX ADMIN — NIMODIPINE 60 MG: 30 CAPSULE, LIQUID FILLED ORAL at 08:24

## 2019-07-24 RX ADMIN — NIMODIPINE 60 MG: 30 CAPSULE, LIQUID FILLED ORAL at 11:59

## 2019-07-24 RX ADMIN — PANTOPRAZOLE SODIUM 40 MG: 40 TABLET, DELAYED RELEASE ORAL at 07:24

## 2019-07-24 RX ADMIN — DOCUSATE SODIUM 100 MG: 100 CAPSULE, LIQUID FILLED ORAL at 08:18

## 2019-07-24 RX ADMIN — INSULIN LISPRO 3 UNITS: 100 INJECTION, SOLUTION INTRAVENOUS; SUBCUTANEOUS at 17:05

## 2019-07-24 RX ADMIN — SODIUM CHLORIDE, SODIUM LACTATE, POTASSIUM CHLORIDE, AND CALCIUM CHLORIDE 125 ML/HR: 600; 310; 30; 20 INJECTION, SOLUTION INTRAVENOUS at 02:48

## 2019-07-24 RX ADMIN — METOPROLOL TARTRATE 25 MG: 25 TABLET ORAL at 17:09

## 2019-07-24 NOTE — PROGRESS NOTES
Problem: Self Care Deficits Care Plan (Adult)  Goal: *Acute Goals and Plan of Care (Insert Text)  Description  Occupational Therapy Goals  Initiated 7/23/2019  1. Patient will perform grooming standing at sink for 10 minutes with no LOB with supervision/set-up within 7 day(s). 2.  Patient will perform upper body dressing with independence within 7 day(s). 3.  Patient will perform upper body dressing with supervision/set-up within 7 day(s). 4.  Patient will perform toilet transfers with supervision/set-up within 7 day(s). 5.  Patient will perform all aspects of toileting with supervision/set-up within 7 day(s). 6.  Patient will participate in upper extremity therapeutic exercise/activities with independence for 5 minutes within 7 day(s). 7.  Patient will utilize energy conservation techniques during functional activities with verbal cues within 7 day(s). Outcome: Progressing Towards Goal    OCCUPATIONAL THERAPY TREATMENT  Patient: Raven Riding (71 y.o. female)  Date: 7/24/2019  Diagnosis: ICH (intracerebral hemorrhage) ([facility]) [I61.9] SAH (subarachnoid hemorrhage) ([facility]       Precautions:    Chart, occupational therapy assessment, plan of care, and goals were reviewed. ASSESSMENT:  Patient cleared by RN to be seen, received in chair, finishing breakfast, agreeable to treatment. Patient completed functional mobility with HHA x1/CGA to stand from chair, stand at sink for grooming and return to chair. Patient with CGA standing at sink for grooming/oral care. Patient returned to chair and was able to brush hair with set-up, ultimately needed mod A to place hair up 2* fatigue. Patient left sitting in chair with call bell in reach, RN aware, lines intact, chair alarm active. Noted edema in B hands, educated patient on elevation and AROM to reduce edema in hands, patient verbalized understanding. Recommend HHOT or no follow-up pending progress, will continue to monitor.      Recommend with nursing patient to complete as able in order to maintain strength, endurance and independence: ADLs with supervision/setup, OOB to chair 3x/day and mobilizing to the bathroom for toileting with 1 assist. Thank you for your assistance. Progression toward goals:  ?       Improving appropriately and progressing toward goals  ? Improving slowly and progressing toward goals  ? Not making progress toward goals and plan of care will be adjusted     PLAN:  Patient continues to benefit from skilled intervention to address the above impairments. Continue treatment per established plan of care. Discharge Recommendations:  Home Health or None  Further Equipment Recommendations for Discharge:  TBD - likely none      SUBJECTIVE:   Patient stated I am tired before I even start.     OBJECTIVE DATA SUMMARY:   Cognitive/Behavioral Status:  Neurologic State: Alert  Orientation Level: Oriented X4  Cognition: Appropriate for age attention/concentration; Follows commands  Perception: Appears intact  Perseveration: No perseveration noted  Safety/Judgement: Awareness of environment; Fall prevention    Functional Mobility and Transfers for ADLs:  Bed Mobility:  Supine to Sit: (NT - received in chair and returned to chair)  Scooting: Stand-by assistance    Transfers:  Sit to Stand: Contact guard assistance    Balance:  Sitting: Intact  Standing: Impaired; With support  Standing - Static: Good  Standing - Dynamic : Fair    ADL Intervention:    Grooming  Washing Face: Contact guard assistance  Brushing Teeth: Contact guard assistance  Brushing/Combing Hair: Moderate assistance(2* fatigue, patient able to brush hair, A to pull back)  Cues: Verbal cues provided;Physical assistance    Cognitive Retraining  Safety/Judgement: Awareness of environment; Fall prevention    Pain:  Pain Scale 1: Numeric (0 - 10)  Pain Intensity 1: 0              Activity Tolerance:   Good, VSS (HR 79-89, BP 100s/40-50s - see flowsheet)  Please refer to the flowsheet for vital signs taken during this treatment. After treatment:   ? Patient left in no apparent distress sitting up in chair  ? Patient left in no apparent distress in bed  ? Call bell left within reach  ? Nursing notified  ? Caregiver present  ?  Chair alarm activated    COMMUNICATION/COLLABORATION:   The patients plan of care was discussed with: Physical Therapist and Registered Nurse    Aquilino Reyna OT  Time Calculation: 28 mins

## 2019-07-24 NOTE — PROGRESS NOTES
Problem: Mobility Impaired (Adult and Pediatric)  Goal: *Acute Goals and Plan of Care (Insert Text)  Description  Physical Therapy Goals  Initiated 7/21/2019  1. Patient will move from supine to sit and sit to supine  and scoot up and down in bed with independence within 7 day(s). 2.  Patient will transfer from bed to chair and chair to bed with independence using the least restrictive device within 7 day(s). 3.  Patient will perform sit to stand with independence within 7 day(s). 4.  Patient will ambulate with independence for 300 feet with the least restrictive device within 7 day(s). 5.  Patient will ascend/descend 14 stairs with handrail(s) with supervision/set-up within 7 day(s). 6.  Patient will improve Johnson Balance score by 7 points within 7 days. Outcome: Progressing Towards Goal   PHYSICAL THERAPY TREATMENT  Patient: Saadia Quiroz (89 y.o. female)  Date: 7/24/2019  Diagnosis: ICH (intracerebral hemorrhage) ([facility]) [I61.9] SAH (subarachnoid hemorrhage) ([facility])       Precautions:    Chart, physical therapy assessment, plan of care and goals were reviewed. ASSESSMENT:  Cleared for mobility by RN. Received pt asleep in chair - awoke to vocalization and agreeable to participate in PT tx session. Today, pt reports fatigue and weakness, however HR remained stable throughout session/after activity with no significant fluctuations. Pt continues to remained most limited by impaired gait/balance 2* anxiety and fear of falling. Today, emphasis on progressive gait training. Completed gait training 30ft x2 reps with CGA with and without RW - continues to demo slow, cautious gait with significant fear of falling. With RW - demos steadier gait with increased pace, increased step length, and improved ability to maintain fwd gaze. Pt reports she feels \"more confident and a lot steadier\" with the RW and able to progress gait distance further.  Returned pt to chair after each gait session with HR steady between 70-90s. Pt remained in chair at end of session, NAD. Continue to anticipate discharge home pending medical course and stability. Will continue to follow for higher level gait/balance training. Progression toward goals:  ?       Improving appropriately and progressing toward goals  ? Improving slowly and progressing toward goals  ? Not making progress toward goals and plan of care will be adjusted     PLAN:  Patient continues to benefit from skilled intervention to address the above impairments. Continue treatment per established plan of care. Discharge Recommendations:  Home   Further Equipment Recommendations for Discharge:  TBD      SUBJECTIVE:   Patient stated I am just so tired.     OBJECTIVE DATA SUMMARY:   Critical Behavior:  Neurologic State: Alert  Orientation Level: Oriented X4  Cognition: Appropriate for age attention/concentration, Follows commands  Safety/Judgement: Awareness of environment, Fall prevention  Functional Mobility Training:  Bed Mobility:    Transfers:  Sit to Stand: Contact guard assistance  Stand to Sit: Contact guard assistance     Balance:  Sitting: Intact  Standing: Impaired  Standing - Static: Good  Standing - Dynamic : Fair    Ambulation/Gait Training:  Distance (ft): 60 Feet (ft)  Assistive Device: Walker, rolling  Ambulation - Level of Assistance: Contact guard assistance  Gait Abnormalities: Trunk sway increased  Speed/Maira: Slow  Step Length: Right shortened;Left shortened    Pain:  Pain Scale 1: Numeric (0 - 10)  Pain Intensity 1: 0    Activity Tolerance:   Quick fatigue, VSS    Please refer to the flowsheet for vital signs taken during this treatment. After treatment:   ? Patient left in no apparent distress sitting up in chair  ? Patient left in no apparent distress in bed  ? Call bell left within reach  ? Nursing notified  ? Caregiver present  ?  Bed alarm activated    COMMUNICATION/EDUCATION:   The patients plan of care was discussed with: Registered Nurse. ?  Fall prevention education was provided and the patient/caregiver indicated understanding. ? Patient/family have participated as able in goal setting and plan of care. ?  Patient/family agree to work toward stated goals and plan of care. ?  Patient understands intent and goals of therapy, but is neutral about his/her participation. ? Patient is unable to participate in goal setting and plan of care.     Thank you for this referral.  Reece Key, SPT

## 2019-07-24 NOTE — DIABETES MGMT
[facility]    DTC Progress Note    Recommendations/ Comments: Chart reviewed due to hyperglycemia. BG mostly above 200 mg/dl and patient has required 10 units of correction in the last 24 hours. If appropriate, please consider:   - increasing pre-meal Lispro to 7 units tid ac (currently 5 units tid ac)   - increasing Lantus to 20 units  Message sent to Dr. Denise Rivera regarding above    Current hospital DM medication: Lantus, 18 units daily  AC lispro, 5 units TID  Lispro, normal sensitivity correction scale     Chart reviewed on [facility]. Patient is a 80 y.o. female with known DM on NPH and Lispro sliding scale at home. Dose unknown. A1c:   Lab Results   Component Value Date/Time    Hemoglobin A1c 8.9 (H) 07/21/2019 08:00 AM    Hemoglobin A1c 8.2 (H) 02/10/2013 03:19 AM       Recent Glucose Results:   Lab Results   Component Value Date/Time     (H) 07/24/2019 09:12 AM    GLUCPOC 243 (H) 07/24/2019 11:46 AM    GLUCPOC 185 (H) 07/24/2019 07:09 AM    GLUCPOC 213 (H) 07/23/2019 09:31 PM        Lab Results   Component Value Date/Time    Creatinine 1.24 (H) 07/24/2019 09:12 AM     Estimated Creatinine Clearance: 50.4 mL/min (A) (based on SCr of 1.24 mg/dL (H)). Active Orders   Diet    DIET DIABETIC CONSISTENT CARB Regular        PO intake:   No data found. Will continue to follow as needed.     Thank you  Dominic Alston, MS, RN, CDE    Time spent: 5 minutes

## 2019-07-24 NOTE — PROGRESS NOTES
1930:  Bedside and Verbal shift change report given to Julio oHod RN (oncoming nurse) by Anna Cooper RN (offgoing nurse). Report included the following information SBAR, Kardex, Procedure Summary, Intake/Output, MAR, Accordion, Recent Results, Med Rec Status and Cardiac Rhythm sinus rhythm. Bedside neuro exam completed with offgoing and oncoming RNs.      2045:  Noted patient is due for nimotop. Per administration instruction, \"give two hours after eating\". Patient finished eating late dinner at 2030. Spoke with pharmacist.  She recommended waiting until 2130 and then give midnight dose at 0030 to get patient back on schedule. 0000:  Assessment unchanged. 0400:  Assessment unchanged. 0630:  Patient in rapid A-Fib w/'s. Converted to sinus rhythm on her own.     0725:  Patient in rapid A-Fib w/'s. Converted to sinus rhythm on her own. Shift summary:  Uneventful shift. A&O x4. No complaints of pain. Voiding in bedside commode. Labetalol x2 given for SBP >140. Cardene restarted to maintain SBP <140.    0730: Bedside and Verbal shift change report given to Anna Cooper RN (oncoming nurse) by Julio Hood RN (offgoing nurse). Report included the following information SBAR, Kardex, Procedure Summary, Intake/Output, MAR, Accordion, Recent Results, Med Rec Status and Cardiac Rhythm sinus rhythm.

## 2019-07-24 NOTE — PROGRESS NOTES
Hospitalist Progress Note    NAME: Saadia Quiroz   :  1950   MRN:  [id number]       Assessment / Plan:  1.  Acute subarachnoid hemorrhage  -S/P cerebral angiogram, no obvious aneurysm   - Optimize BP control, currently on cardene gtt, keep SBP less than 140  - Continue Nimodipine  - continue keppra   - PT eval pending  - CT head with \" Stable small to moderate volume subarachnoid hemorrhage \"  - neurochecks, seizure precaution  - appreciate NS and BERRY input  CT shows stable SAH   Scheduled for cerebral iron tomorrow am   2.  Acute kidney injury,stable   - likely prerenal due to recent GI loss  - improving  - continue IV hydration  - avoid nephrotoxic meds     3.  Diabetes mellitus type 2,with hyperglycemia   - better controlled  - continue SSNI, accuchecks, monitor   Resume lower dose of Lantus, she is eating better, required SSI,  Increased Lantus 18 BID         4.  Accelerated hypertension.  -tight BP control to keep SBP less than 140  -  Cardene gtt off   - monitor   - 5. Afib: BB no anticoagulation,   Code status: full  DVT prophylaxis: SCD     Care Plan discussed with: Patient/Family  Disposition: Home w/Family  Subjective:     Chief Complaint / Reason for Physician Visit  \" feeling  Ok tired \". Discussed with RN events overnight. Review of Systems:  Symptom Y/N Comments  Symptom Y/N Comments   Fever/Chills    Chest Pain     Poor Appetite    Edema     Cough    Abdominal Pain     Sputum    Joint Pain     SOB/JAMES    Pruritis/Rash     Nausea/vomit    Tolerating PT/OT     Diarrhea    Tolerating Diet     Constipation    Other       Could NOT obtain due to:      Objective:     VITALS:   Last 24hrs VS reviewed since prior progress note.  Most recent are:  Patient Vitals for the past 24 hrs:   Temp Pulse Resp BP SpO2   19 0902  80  122/56    19 0901  89  105/43    19 0849  79  110/53    19 0800 98.1 °F (36.7 °C) 67 16 111/67 94 %   19 0700  (!) 117 13 119/61 93 %   07/24/19 0600  (!) 112 13 111/73 92 %   07/24/19 0500  73 14 136/56 94 %   07/24/19 0400 98 °F (36.7 °C) 71 15 135/62 93 %   07/24/19 0300  73 14 143/67 92 %   07/24/19 0200  68 14 151/64 94 %   07/24/19 0100  71 14 147/62 93 %   07/24/19 0000 98 °F (36.7 °C) 62 13 154/70 94 %   07/23/19 2300  67 16 148/64 94 %   07/23/19 2200  70 18 153/62 94 %   07/23/19 2100  63 15 134/66 95 %   07/23/19 2000 98.6 °F (37 °C) 74 20 154/70 96 %   07/23/19 1930  62 18 122/68 95 %   07/23/19 1900  64 15 106/57 94 %   07/23/19 1830  (!) 117 15 105/55 93 %   07/23/19 1800  94 17 110/70 94 %   07/23/19 1730  (!) 118 26 129/89 96 %   07/23/19 1700  (!) 149 20 120/87 96 %   07/23/19 1630  97 19 118/68 95 %   07/23/19 1441  (!) 117 16  95 %   07/23/19 1440  (!) 115 17 127/70 95 %   07/23/19 1430  (!) 114 15 139/70 94 %   07/23/19 1415  64 16  94 %   07/23/19 1400  67 15 131/58 94 %   07/23/19 1330  65 16 128/59 95 %   07/23/19 1300  (!) 105 24 (!) 166/133 95 %       Intake/Output Summary (Last 24 hours) at 7/24/2019 1249  Last data filed at 7/24/2019 1100  Gross per 24 hour   Intake 3048.33 ml   Output 2100 ml   Net 948.33 ml        PHYSICAL EXAM:  General: WD, WN. Alert, cooperative, no acute distress    EENT:  EOMI. Anicteric sclerae. MMM  Resp:  CTA bilaterally, no wheezing or rales. No accessory muscle use  CV:  Regular  rhythm,  No edema  GI:  Soft, Non distended, Non tender.  +Bowel sounds  Neurologic:  Alert and oriented X 3, normal speech,   Psych:   Good insight. Not anxious nor agitated  Skin:  No rashes.   No jaundice    Reviewed most current lab test results and cultures  YES  Reviewed most current radiology test results   YES  Review and summation of old records today    NO  Reviewed patient's current orders and MAR    YES  PMH/SH reviewed - no change compared to H&P  ________________________________________________________________________  Care Plan discussed with:    Comments   Patient     Family RN     Care Manager     Consultant                        Multidiciplinary team rounds were held today with , nursing, pharmacist and clinical coordinator. Patient's plan of care was discussed; medications were reviewed and discharge planning was addressed. ________________________________________________________________________  Total NON critical care TIME: 35 Minutes    Total CRITICAL CARE TIME Spent:   Minutes non procedure based      Comments   >50% of visit spent in counseling and coordination of care     ________________________________________________________________________  Marco A Gooden MD     Procedures: see electronic medical records for all procedures/Xrays and details which were not copied into this note but were reviewed prior to creation of Plan. LABS:  I reviewed today's most current labs and imaging studies.   Pertinent labs include:  Recent Labs     07/24/19  0912   WBC 8.7   HGB 11.5   HCT 36.3        Recent Labs     07/24/19  0912 07/22/19  0429    141   K 3.8 3.6    109*   CO2 26 23   * 183*   BUN 23* 20   CREA 1.24* 1.03*   CA 9.0 8.9   ALB  --  3.0*   TBILI  --  0.8   SGOT  --  13*   ALT  --  20       Signed: Marco A Gooden MD

## 2019-07-24 NOTE — PROGRESS NOTES
Neurointerventional Surgery Progress Note  Brian Contreras AGACNP-BC  [phone]    Admit Date: 2019   LOS: 5 days        Daily Progress Note: 2019    POD: 4 diagnostic cerebral angiogram by Dr. Betty Martinez    HPI: Mandy Pearson is a 80 y.o. female with a PMH significant for HTN, Type 1 DM, hx of acute renal failure, and atrial fibrillation who presented to [address] [place] [address] ED on  due to complaints of headache and weakness. Patient reported on 19 she started vomiting and then subsequently developed a headache the next day. She tried taking tylenol for her headache with some relief. She reported a hx of a minor headache, but this headache was worse than usual for her. The patient also reported weakness x 2 days causing a few falls at home. She denied any LOC. She reported her blood glucose has been elevated over the past 5 days, more so than usual. In the ED, a stat head CT was performed which showed SAH in the left sylvian fissure and basal cisterns. 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 She was transferred to [facility] for higher level of care. She is not a smoker and reports her uncle  from a cerebral aneurysm. Subjective:     Patient is feeling a little bit better this AM. No acute neurological changes overnight. Patient is working with occupational therapy at this time. Standing up brushing her teeth without difficulty. TCDs do show some mildly elevated velocities in right NGUYEN indicating possible vasospasm. Patient however with no clinical changes in neuro exam at this time. Will continue to monitor closely. Patient with history of acute renal failure related to gastritis and GI Bleeding in . Some mild residual renal dysfunction (1.9 on admission, 1.23 today). Will hold off on CTA head today and plan for cerebral angiogram tomorrow to evaluate vessels and vasospasm.      She denies any vision changes, dizziness, vomiting, abdominal pain, chest pain, SOB, numbness, tingling, or weakness.      Current Facility-Administered Medications   Medication Dose Route Frequency Provider Last Rate Last Dose    labetalol (NORMODYNE;TRANDATE) 20 mg/4 mL (5 mg/mL) injection 20 mg  20 mg IntraVENous Q1H PRN Anatoliy Arroyo NP        hydrALAZINE (APRESOLINE) 20 mg/mL injection 10 mg  10 mg IntraVENous Q1H PRN Javier Alatorre NP   10 mg at 07/23/19 1251    insulin glargine (LANTUS) injection 18 Units  18 Units SubCUTAneous BID Marilee Pillai MD   18 Units at 07/24/19 0820    metoprolol tartrate (LOPRESSOR) tablet 25 mg  25 mg Oral BID Osei Millan MD   25 mg at 07/24/19 0818    metoprolol (LOPRESSOR) injection 5 mg  5 mg IntraVENous Q15MIN PRN Javier Alatorre NP   5 mg at 07/23/19 1501    lactated Ringers infusion  125 mL/hr IntraVENous CONTINUOUS Betsy Goncalves  mL/hr at 07/24/19 0248 125 mL/hr at 07/24/19 0248    insulin lispro (HUMALOG) injection   SubCUTAneous AC&HS Ninette Kawasaki, MD   2 Units at 07/24/19 0718    pantoprazole (PROTONIX) tablet 40 mg  40 mg Oral ACB Ninette Kawasaki, MD   40 mg at 07/24/19 0724    levETIRAcetam (KEPPRA) tablet 500 mg  500 mg Oral Q12H Marilee Pillai MD   500 mg at 07/24/19 0817    polyethylene glycol (MIRALAX) packet 17 g  17 g Oral DAILY PRN Betsy Goncalves NP        insulin lispro (HUMALOG) injection 5 Units  5 Units SubCUTAneous Natasha Szymanski MD   5 Units at 07/24/19 0718    docusate sodium (COLACE) capsule 100 mg  100 mg Oral DAILY Betsy Goncalves NP   100 mg at 07/24/19 0818    niCARdipine (CARDENE) 25 mg in 0.9% sodium chloride 250 mL infusion  0-15 mg/hr IntraVENous TITRATE Jose Castro MD 25 mL/hr at 07/24/19 0725 2.5 mg/hr at 07/24/19 0725    ondansetron (ZOFRAN) injection 4 mg  4 mg IntraVENous Q6H PRN Brenda Damian MD   4 mg at 07/23/19 1301    naloxone (NARCAN) injection 0.4 mg  0.4 mg IntraVENous PRN Brenda Damian MD        acetaminophen (TYLENOL) tablet 650 mg  650 mg Oral Q4H PRN Suad Lai MD   650 mg at 07/24/19 0399    Or    acetaminophen (TYLENOL) solution 650 mg  650 mg Per NG tube Q4H PRN Suad Lai MD        Or   [place] acetaminophen (TYLENOL) suppository 650 mg  650 mg Rectal Q4H PRN Suad Lai MD        glucose chewable tablet 16 g  4 Tab Oral PRN Suad Lai MD        dextrose (D50W) injection syrg 12.5-25 g  25-50 mL IntraVENous PRN Suad Lai MD        glucagon (GLUCAGEN) injection 1 mg  1 mg IntraMUSCular PRN Suad Lai MD        niMODipine (NIMOTOP) capsule 60 mg  60 mg Oral Q4H Lack, Leotis Eisenmenger, NP   60 mg at 07/24/19 0184        No Known Allergies    Review of Systems:  Pertinent items are noted in the History of Present Illness. Objective:     Visit Vitals  /56 (BP 1 Location: Right arm, BP Patient Position: At rest;Sitting;Post activity)   Pulse 80   Temp 98 °F (36.7 °C)   Resp 16   Ht 5' 4\" (1.626 m) Comment: From documentation on 7/19/19   Wt 218 lb 14.7 oz (99.3 kg)   SpO2 94%   BMI 37.58 kg/m²    O2 Flow Rate (L/min): 2 l/min O2 Device: Room air         Vitals:    07/24/19 0800 07/24/19 0849 07/24/19 0901 07/24/19 0902   BP: 111/67 110/53 105/43 122/56   Pulse: 67 79 89 80   Resp: 16      Temp:       SpO2: 94%      Weight:       Height:            Physical Exam:  GENERAL: alert, cooperative, no distress, appears stated age  LUNG: clear to auscultation bilaterally  HEART: regular rate and rhythm, S1, S2 normal, no murmur, click, rub or gallop  EXTREMITIES:  extremities normal, atraumatic, no cyanosis, mild non-pitting edema in bilateral hands. 2+ distal pulses bilaterally. SKIN: no rash or abnormalities. Skin warm to touch. Right groin site clean, dry, and intact. No hematoma, bruising, or bleeding at site. Neurologic Exam:  Mental Status:  Alert and oriented x 4. Appropriate affect, mood and behavior.        Language:    Normal fluency, repetition, comprehension and naming. Cranial Nerves:        Pupils equal, round and reactive to light. Visual fields full to confrontation. Extraocular movements intact. Facial sensation intact. Full facial strength, no asymmetry. Hearing grossly intact bilaterally. No dysarthria. Tongue protrudes to midline, palate elevates symmetrically. Shoulder shrug 5/5 bilaterally. Motor:    No pronator drift. Bulk and tone normal.      5/5 power in all extremities proximally and distally. No involuntary movements. Sensation:    Sensation intact throughout to light touch. Coordination & Gait: No ataxia with FTN and HTS testing. Gait deferred.      24 hour results:    Recent Results (from the past 24 hour(s))   TCD INTRACRANIAL ARTERIES COMPLETE    Collection Time: 07/23/19 11:13 AM   Result Value Ref Range    Right MCA 1  cm/s    Right MCA 1 EDV 33 cm/s    Right MCA 1 Mean Velocity 58 cm/s    Right NGUYEN  cm/s    Right NGUYEN  cm/s    Right NGUYEN Mean Velocity 172 cm/s    Right ICA  cm/s    Right ICA EDV 42 cm/s    Right ICA Mean Velocity 71 cm/s    Right PCA 1  cm/s    Right PCA 1 EDV 38 cm/s    Right PCA 1 Mean Velocity 66 cm/s    Right External ICA PSV 61 cm/s    Right External ICA EDV 23 cm/s    Right External ICA Mean Velocity 36 cm/s    Left MCA 1 PSV 90 cm/s    Left MCA 1 EDV 40 cm/s    Left MCA 1 Mean Velocity 57 cm/s    Left PCA 1  cm/s    Left PCA 1 EDV 50 cm/s    Left PCA 1 Mean Velocity 83 cm/s    Basilar Artery  cm/s    Basilar Artery EDV 42 cm/s    Basilar Artery Mean Mesfin 71 cm/s    Left Vertebral Mean Velocity 80 cm/s    Left Vertebral  cm/s    Left Vertebral EDV 53 cm/s    Right Lindegaard Ratio 1.6     Right NGUYEN Lindegaard Ratio 4.7    GLUCOSE, POC    Collection Time: 07/23/19 12:34 PM   Result Value Ref Range    Glucose (POC) 182 (H) 65 - 100 mg/dL    Performed by Valeria Minor    GLUCOSE, POC    Collection Time: 07/23/19  5:41 PM   Result Value Ref Range    Glucose (POC) 203 (H) 65 - 100 mg/dL    Performed by Laz Colindres    GLUCOSE, POC    Collection Time: 07/23/19  9:31 PM   Result Value Ref Range    Glucose (POC) 213 (H) 65 - 100 mg/dL    Performed by [address], POC    Collection Time: 07/24/19  7:09 AM   Result Value Ref Range    Glucose (POC) 185 (H) 65 - 100 mg/dL    Performed by Curtis Nolasco, BASIC    Collection Time: 07/24/19  9:12 AM   Result Value Ref Range    Sodium 141 136 - 145 mmol/L    Potassium 3.8 3.5 - 5.1 mmol/L    Chloride 107 97 - 108 mmol/L    CO2 26 21 - 32 mmol/L    Anion gap 8 5 - 15 mmol/L    Glucose 238 (H) 65 - 100 mg/dL    BUN 23 (H) 6 - 20 MG/DL    Creatinine 1.24 (H) 0.55 - 1.02 MG/DL    BUN/Creatinine ratio 19 12 - 20      GFR est AA 52 (L) >60 ml/min/1.73m2    GFR est non-AA 43 (L) >60 ml/min/1.73m2    Calcium 9.0 8.5 - 10.1 MG/DL   CBC WITH AUTOMATED DIFF    Collection Time: 07/24/19  9:12 AM   Result Value Ref Range    WBC 8.7 3.6 - 11.0 K/uL    RBC 3.84 3.80 - 5.20 M/uL    HGB 11.5 11.5 - 16.0 g/dL    HCT 36.3 35.0 - 47.0 %    MCV 94.5 80.0 - 99.0 FL    MCH 29.9 26.0 - 34.0 PG    MCHC 31.7 30.0 - 36.5 g/dL    RDW 14.0 11.5 - 14.5 %    PLATELET 009 339 - 694 K/uL    MPV 10.4 8.9 - 12.9 FL    NRBC 0.0 0  WBC    ABSOLUTE NRBC 0.00 0.00 - 0.01 K/uL    NEUTROPHILS 73 32 - 75 %    LYMPHOCYTES 17 12 - 49 %    MONOCYTES 8 5 - 13 %    EOSINOPHILS 1 0 - 7 %    BASOPHILS 0 0 - 1 %    IMMATURE GRANULOCYTES 1 (H) 0.0 - 0.5 %    ABS. NEUTROPHILS 6.5 1.8 - 8.0 K/UL    ABS. LYMPHOCYTES 1.5 0.8 - 3.5 K/UL    ABS. MONOCYTES 0.7 0.0 - 1.0 K/UL    ABS. EOSINOPHILS 0.0 0.0 - 0.4 K/UL    ABS. BASOPHILS 0.0 0.0 - 0.1 K/UL    ABS. IMM. GRANS. 0.1 (H) 0.00 - 0.04 K/UL    DF AUTOMATED            Imaging:  CT of Head on 7/19/2019 shows  IMPRESSION:   Acute subarachnoid hemorrhage in the left sylvian fissure and basal cisterns. No  evidence for acute infarct.     CTA of the Head and Neck on 7/19/2019 shows  IMPRESSION:  Severe atherosclerotic change in the cervical internal carotid artery. Greater  than 90% stenosis of the proximal left internal carotid artery.     Moderate stenosis right internal carotid artery approximately 50%.     No intracranial aneurysm. Minimal irregularity of M2 segments may be related to  mild vasospasm. Stable degree of subarachnoid hemorrhage when compared to the CT  of the head performed earlier in the day. CT of Head on 7/20/2019 shows  IMPRESSION:  1. Stable small to moderate volume subarachnoid hemorrhage as described above,  largest components in the suprasellar cistern, prepontine cistern, and left sylvian fissure. CT of Head on 7/22/2019 shows  IMPRESSION  IMPRESSION: Decreased subarachnoid hemorrhage. No new intracranial hemorrhage. Assessment:     Principal Problem:    SAH (subarachnoid hemorrhage) ([facility]) (7/20/2019)        Plan:   SAH, Savage Mcdonald 1, Perez Grade 2, PBD day 8   - as seen on CT/CTA, no aneurysm seen on CTA   - neuro exam non-focal   - s/p cerebral angiogram on 7/20 which did not show an obvious aneurysm to explain blood pattern, 1 mm x 1 mm triangular outpouching on distal right NGUYEN trunk; ? Infundibulum, tiny aneurysm seen on angiogram, patient will need a repeat angiogram in one week.     - Repeat Head CT 7/22, shows a decrease in [facility]   - most likely a perimesencephalic SAH              - Continue Nimotop day 5 of 21 for prevention of delayed cerebral ischemia               - Continue Keppra 500 BID for seizure ppx              - LR at 125 ml/hr to maintain euvolemia   - continue TCD's daily               - Strict I&O's              - TTE shows EF 56-60%, moderate pulmonary HTN, trace mitral valve regurgitation, trace tricuspid valve regurgitation               - every 2 hour neuro checks               - SBP goals 110-160     Hx of HTN   - SBP goal 110-160    - Cardene PRN, hydralazine/Labetalol PRN   - Hospitalist following    DM type 1   - sliding scale insulin and point of care glucose checks   - Management per hospitalist       Plan d/w Dr. Marianela Denton, ICU RN, patient. Continue to monitor in ICU.       Anibal Villalta NP

## 2019-07-25 ENCOUNTER — ANESTHESIA EVENT (OUTPATIENT)
Dept: INTERVENTIONAL RADIOLOGY/VASCULAR | Age: 69
DRG: 065 | End: 2019-07-25
Payer: COMMERCIAL

## 2019-07-25 ENCOUNTER — ANESTHESIA (OUTPATIENT)
Dept: INTERVENTIONAL RADIOLOGY/VASCULAR | Age: 69
DRG: 065 | End: 2019-07-25
Payer: COMMERCIAL

## 2019-07-25 ENCOUNTER — APPOINTMENT (OUTPATIENT)
Dept: INTERVENTIONAL RADIOLOGY/VASCULAR | Age: 69
DRG: 065 | End: 2019-07-25
Attending: RADIOLOGY
Payer: COMMERCIAL

## 2019-07-25 LAB
BASILAR ARTERY EDV: 51 CM/S
BASILAR ARTERY MEAN VEL: 79 CM/S
BASILAR ARTERY PSV: 135 CM/S
GLUCOSE BLD STRIP.AUTO-MCNC: 144 MG/DL (ref 65–100)
GLUCOSE BLD STRIP.AUTO-MCNC: 145 MG/DL (ref 65–100)
GLUCOSE BLD STRIP.AUTO-MCNC: 169 MG/DL (ref 65–100)
GLUCOSE BLD STRIP.AUTO-MCNC: 201 MG/DL (ref 65–100)
GLUCOSE BLD STRIP.AUTO-MCNC: 84 MG/DL (ref 65–100)
LEFT MCA 1 EDV: 44 CM/S
LEFT MCA 1 MEAN VEL: 58 CM/S
LEFT MCA 1 PSV: 86 CM/S
LEFT PCA 1 EDV: 54 CM/S
LEFT PCA 1 MEAN VEL: 80 CM/S
LEFT PCA 1 PSV: 133 CM/S
LEFT VERTEBRAL EDV TCD: 41 CM/S
LEFT VERTEBRAL MEAN VEL: 64 CM/S
LEFT VERTEBRAL PSV TCD: 109 CM/S
RIGHT ACA EDV: 107 CM/S
RIGHT ACA LINDEGAARD RATIO: 4.4
RIGHT ACA MEAN VEL: 151 CM/S
RIGHT ACA PSV: 240 CM/S
RIGHT EX ICA EDV: 22 CM/S
RIGHT EX ICA MEAN VEL: 34 CM/S
RIGHT EX ICA PSV: 59 CM/S
RIGHT ICA EDV: 46 CM/S
RIGHT ICA MEAN VEL: 72 CM/S
RIGHT ICA PSV: 125 CM/S
RIGHT LINDEGAARD RATIO: 1.9
RIGHT MCA 1 EDV: 46 CM/S
RIGHT MCA 1 MEAN VEL: 63 CM/S
RIGHT MCA 1 PSV: 142 CM/S
RIGHT PCA 1 EDV: 33 CM/S
RIGHT PCA 1 MEAN VEL: 60 CM/S
RIGHT PCA 1 PSV: 115 CM/S
SERVICE CMNT-IMP: ABNORMAL
SERVICE CMNT-IMP: NORMAL

## 2019-07-25 PROCEDURE — 74011250637 HC RX REV CODE- 250/637: Performed by: NURSE PRACTITIONER

## 2019-07-25 PROCEDURE — 77030012468 HC VLV BLEEDBK CNTRL ABBT -B

## 2019-07-25 PROCEDURE — 82962 GLUCOSE BLOOD TEST: CPT

## 2019-07-25 PROCEDURE — 74011250636 HC RX REV CODE- 250/636: Performed by: NURSE PRACTITIONER

## 2019-07-25 PROCEDURE — 74011636320 HC RX REV CODE- 636/320: Performed by: RADIOLOGY

## 2019-07-25 PROCEDURE — B31N1ZZ FLUOROSCOPY OF OTHER UPPER ARTERIES USING LOW OSMOLAR CONTRAST: ICD-10-PCS | Performed by: ORTHOPAEDIC SURGERY

## 2019-07-25 PROCEDURE — 76060000032 HC ANESTHESIA 0.5 TO 1 HR

## 2019-07-25 PROCEDURE — C1894 INTRO/SHEATH, NON-LASER: HCPCS

## 2019-07-25 PROCEDURE — 36224 PLACE CATH CAROTD ART: CPT

## 2019-07-25 PROCEDURE — 65610000006 HC RM INTENSIVE CARE

## 2019-07-25 PROCEDURE — 74011000250 HC RX REV CODE- 250: Performed by: NURSE PRACTITIONER

## 2019-07-25 PROCEDURE — 77030016715 HC CATH ANGI DX TMPO2 CARD -B

## 2019-07-25 PROCEDURE — 74011250637 HC RX REV CODE- 250/637: Performed by: INTERNAL MEDICINE

## 2019-07-25 PROCEDURE — 74011250637 HC RX REV CODE- 250/637: Performed by: FAMILY MEDICINE

## 2019-07-25 PROCEDURE — B31F1ZZ FLUOROSCOPY OF LEFT VERTEBRAL ARTERY USING LOW OSMOLAR CONTRAST: ICD-10-PCS | Performed by: ORTHOPAEDIC SURGERY

## 2019-07-25 PROCEDURE — 74011636637 HC RX REV CODE- 636/637: Performed by: NURSE PRACTITIONER

## 2019-07-25 PROCEDURE — B3151ZZ FLUOROSCOPY OF BILATERAL COMMON CAROTID ARTERIES USING LOW OSMOLAR CONTRAST: ICD-10-PCS | Performed by: ORTHOPAEDIC SURGERY

## 2019-07-25 PROCEDURE — 77030008584 HC TOOL GDWRE DEV TERU -A

## 2019-07-25 PROCEDURE — 77030008638 HC TU CONN COOK -A

## 2019-07-25 PROCEDURE — 74011250636 HC RX REV CODE- 250/636

## 2019-07-25 PROCEDURE — 74011636637 HC RX REV CODE- 636/637: Performed by: INTERNAL MEDICINE

## 2019-07-25 PROCEDURE — C1769 GUIDE WIRE: HCPCS

## 2019-07-25 PROCEDURE — 74011250636 HC RX REV CODE- 250/636: Performed by: RADIOLOGY

## 2019-07-25 RX ORDER — MIDAZOLAM HYDROCHLORIDE 1 MG/ML
INJECTION, SOLUTION INTRAMUSCULAR; INTRAVENOUS AS NEEDED
Status: DISCONTINUED | OUTPATIENT
Start: 2019-07-25 | End: 2019-07-25 | Stop reason: HOSPADM

## 2019-07-25 RX ORDER — LIDOCAINE HYDROCHLORIDE 10 MG/ML
10 INJECTION, SOLUTION EPIDURAL; INFILTRATION; INTRACAUDAL; PERINEURAL ONCE
Status: COMPLETED | OUTPATIENT
Start: 2019-07-25 | End: 2019-07-25

## 2019-07-25 RX ORDER — ENOXAPARIN SODIUM 100 MG/ML
40 INJECTION SUBCUTANEOUS EVERY 24 HOURS
Status: DISCONTINUED | OUTPATIENT
Start: 2019-07-25 | End: 2019-07-29 | Stop reason: HOSPADM

## 2019-07-25 RX ORDER — INSULIN GLARGINE 100 [IU]/ML
18 INJECTION, SOLUTION SUBCUTANEOUS 2 TIMES DAILY
Status: DISCONTINUED | OUTPATIENT
Start: 2019-07-25 | End: 2019-07-26

## 2019-07-25 RX ORDER — HEPARIN SODIUM 1000 [USP'U]/ML
2000 INJECTION, SOLUTION INTRAVENOUS; SUBCUTANEOUS ONCE
Status: ACTIVE | OUTPATIENT
Start: 2019-07-25 | End: 2019-07-26

## 2019-07-25 RX ORDER — LOSARTAN POTASSIUM 50 MG/1
100 TABLET ORAL DAILY
Status: DISCONTINUED | OUTPATIENT
Start: 2019-07-25 | End: 2019-07-29 | Stop reason: HOSPADM

## 2019-07-25 RX ORDER — FENTANYL CITRATE 50 UG/ML
INJECTION, SOLUTION INTRAMUSCULAR; INTRAVENOUS
Status: COMPLETED
Start: 2019-07-25 | End: 2019-07-25

## 2019-07-25 RX ORDER — SODIUM CHLORIDE, SODIUM LACTATE, POTASSIUM CHLORIDE, CALCIUM CHLORIDE 600; 310; 30; 20 MG/100ML; MG/100ML; MG/100ML; MG/100ML
INJECTION, SOLUTION INTRAVENOUS
Status: DISCONTINUED | OUTPATIENT
Start: 2019-07-25 | End: 2019-07-25 | Stop reason: HOSPADM

## 2019-07-25 RX ORDER — HYDROCHLOROTHIAZIDE 25 MG/1
25 TABLET ORAL DAILY
Status: DISCONTINUED | OUTPATIENT
Start: 2019-07-25 | End: 2019-07-29 | Stop reason: HOSPADM

## 2019-07-25 RX ORDER — HEPARIN SODIUM 1000 [USP'U]/ML
INJECTION, SOLUTION INTRAVENOUS; SUBCUTANEOUS AS NEEDED
Status: DISCONTINUED | OUTPATIENT
Start: 2019-07-25 | End: 2019-07-25 | Stop reason: HOSPADM

## 2019-07-25 RX ORDER — FENTANYL CITRATE 50 UG/ML
INJECTION, SOLUTION INTRAMUSCULAR; INTRAVENOUS AS NEEDED
Status: DISCONTINUED | OUTPATIENT
Start: 2019-07-25 | End: 2019-07-25 | Stop reason: HOSPADM

## 2019-07-25 RX ORDER — LIDOCAINE HYDROCHLORIDE 10 MG/ML
INJECTION, SOLUTION EPIDURAL; INFILTRATION; INTRACAUDAL; PERINEURAL
Status: DISPENSED
Start: 2019-07-25 | End: 2019-07-26

## 2019-07-25 RX ORDER — ESMOLOL HYDROCHLORIDE 10 MG/ML
INJECTION INTRAVENOUS AS NEEDED
Status: DISCONTINUED | OUTPATIENT
Start: 2019-07-25 | End: 2019-07-25 | Stop reason: HOSPADM

## 2019-07-25 RX ORDER — VERAPAMIL HYDROCHLORIDE 2.5 MG/ML
20 INJECTION, SOLUTION INTRAVENOUS
Status: DISCONTINUED | OUTPATIENT
Start: 2019-07-25 | End: 2019-07-26

## 2019-07-25 RX ORDER — HEPARIN SODIUM 1000 [USP'U]/ML
INJECTION, SOLUTION INTRAVENOUS; SUBCUTANEOUS
Status: COMPLETED
Start: 2019-07-25 | End: 2019-07-25

## 2019-07-25 RX ORDER — GUAIFENESIN 100 MG/5ML
81 LIQUID (ML) ORAL DAILY
Status: DISCONTINUED | OUTPATIENT
Start: 2019-07-26 | End: 2019-07-29 | Stop reason: HOSPADM

## 2019-07-25 RX ORDER — MIDAZOLAM HYDROCHLORIDE 1 MG/ML
INJECTION, SOLUTION INTRAMUSCULAR; INTRAVENOUS
Status: COMPLETED
Start: 2019-07-25 | End: 2019-07-25

## 2019-07-25 RX ORDER — INSULIN GLARGINE 100 [IU]/ML
9 INJECTION, SOLUTION SUBCUTANEOUS ONCE
Status: COMPLETED | OUTPATIENT
Start: 2019-07-25 | End: 2019-07-25

## 2019-07-25 RX ORDER — INSULIN LISPRO 100 [IU]/ML
7 INJECTION, SOLUTION INTRAVENOUS; SUBCUTANEOUS
Status: DISCONTINUED | OUTPATIENT
Start: 2019-07-25 | End: 2019-07-29 | Stop reason: HOSPADM

## 2019-07-25 RX ADMIN — LIDOCAINE HYDROCHLORIDE 10 ML: 10 INJECTION, SOLUTION EPIDURAL; INFILTRATION; INTRACAUDAL; PERINEURAL at 15:30

## 2019-07-25 RX ADMIN — LABETALOL 20 MG/4 ML (5 MG/ML) INTRAVENOUS SYRINGE 20 MG: at 01:37

## 2019-07-25 RX ADMIN — HEPARIN SODIUM 2000 UNITS: 1000 INJECTION, SOLUTION INTRAVENOUS; SUBCUTANEOUS at 15:37

## 2019-07-25 RX ADMIN — ENOXAPARIN SODIUM 40 MG: 40 INJECTION SUBCUTANEOUS at 17:12

## 2019-07-25 RX ADMIN — SODIUM CHLORIDE, SODIUM LACTATE, POTASSIUM CHLORIDE, CALCIUM CHLORIDE: 600; 310; 30; 20 INJECTION, SOLUTION INTRAVENOUS at 15:10

## 2019-07-25 RX ADMIN — INSULIN LISPRO 2 UNITS: 100 INJECTION, SOLUTION INTRAVENOUS; SUBCUTANEOUS at 16:30

## 2019-07-25 RX ADMIN — PANTOPRAZOLE SODIUM 40 MG: 40 TABLET, DELAYED RELEASE ORAL at 06:47

## 2019-07-25 RX ADMIN — INSULIN GLARGINE 9 UNITS: 100 INJECTION, SOLUTION SUBCUTANEOUS at 11:25

## 2019-07-25 RX ADMIN — LABETALOL 20 MG/4 ML (5 MG/ML) INTRAVENOUS SYRINGE 20 MG: at 06:47

## 2019-07-25 RX ADMIN — INSULIN GLARGINE 18 UNITS: 100 INJECTION, SOLUTION SUBCUTANEOUS at 18:03

## 2019-07-25 RX ADMIN — HEPARIN SODIUM 4000 UNITS: 5000 INJECTION, SOLUTION INTRAVENOUS; SUBCUTANEOUS at 16:03

## 2019-07-25 RX ADMIN — NIMODIPINE 60 MG: 30 CAPSULE, LIQUID FILLED ORAL at 04:30

## 2019-07-25 RX ADMIN — LOSARTAN POTASSIUM 100 MG: 50 TABLET ORAL at 17:10

## 2019-07-25 RX ADMIN — IOPAMIDOL 46 ML: 612 INJECTION, SOLUTION INTRAVENOUS at 16:00

## 2019-07-25 RX ADMIN — FENTANYL CITRATE 25 MCG: 50 INJECTION, SOLUTION INTRAMUSCULAR; INTRAVENOUS at 15:55

## 2019-07-25 RX ADMIN — NIMODIPINE 60 MG: 30 CAPSULE, LIQUID FILLED ORAL at 08:24

## 2019-07-25 RX ADMIN — METOPROLOL TARTRATE 25 MG: 25 TABLET ORAL at 18:03

## 2019-07-25 RX ADMIN — METOPROLOL TARTRATE 5 MG: 5 INJECTION INTRAVENOUS at 13:19

## 2019-07-25 RX ADMIN — MIDAZOLAM HYDROCHLORIDE 0.5 MG: 1 INJECTION, SOLUTION INTRAMUSCULAR; INTRAVENOUS at 15:14

## 2019-07-25 RX ADMIN — NIMODIPINE 60 MG: 30 CAPSULE, LIQUID FILLED ORAL at 17:10

## 2019-07-25 RX ADMIN — ESMOLOL HYDROCHLORIDE 20 MG: 10 INJECTION INTRAVENOUS at 15:30

## 2019-07-25 RX ADMIN — SODIUM CHLORIDE, SODIUM LACTATE, POTASSIUM CHLORIDE, AND CALCIUM CHLORIDE 125 ML/HR: 600; 310; 30; 20 INJECTION, SOLUTION INTRAVENOUS at 22:59

## 2019-07-25 RX ADMIN — FENTANYL CITRATE 25 MCG: 50 INJECTION, SOLUTION INTRAMUSCULAR; INTRAVENOUS at 15:14

## 2019-07-25 RX ADMIN — ACETAMINOPHEN 650 MG: 325 TABLET ORAL at 22:57

## 2019-07-25 RX ADMIN — NIMODIPINE 60 MG: 30 CAPSULE, LIQUID FILLED ORAL at 00:10

## 2019-07-25 RX ADMIN — IOPAMIDOL 50 ML: 612 INJECTION, SOLUTION INTRAVENOUS at 15:56

## 2019-07-25 RX ADMIN — DOCUSATE SODIUM 100 MG: 100 CAPSULE, LIQUID FILLED ORAL at 09:00

## 2019-07-25 RX ADMIN — ACETAMINOPHEN 650 MG: 325 TABLET ORAL at 13:18

## 2019-07-25 RX ADMIN — LEVETIRACETAM 500 MG: 500 TABLET, FILM COATED ORAL at 09:00

## 2019-07-25 RX ADMIN — LEVETIRACETAM 500 MG: 500 TABLET, FILM COATED ORAL at 20:26

## 2019-07-25 RX ADMIN — ESMOLOL HYDROCHLORIDE 40 MG: 10 INJECTION INTRAVENOUS at 15:41

## 2019-07-25 RX ADMIN — METOPROLOL TARTRATE 25 MG: 25 TABLET ORAL at 09:00

## 2019-07-25 RX ADMIN — NIMODIPINE 60 MG: 30 CAPSULE, LIQUID FILLED ORAL at 20:25

## 2019-07-25 RX ADMIN — INSULIN LISPRO 7 UNITS: 100 INJECTION, SOLUTION INTRAVENOUS; SUBCUTANEOUS at 16:30

## 2019-07-25 RX ADMIN — HEPARIN SODIUM 4000 UNITS: 5000 INJECTION, SOLUTION INTRAVENOUS; SUBCUTANEOUS at 16:00

## 2019-07-25 RX ADMIN — HEPARIN SODIUM 4000 UNITS: 5000 INJECTION, SOLUTION INTRAVENOUS; SUBCUTANEOUS at 16:02

## 2019-07-25 RX ADMIN — NIMODIPINE 60 MG: 30 CAPSULE, LIQUID FILLED ORAL at 13:18

## 2019-07-25 RX ADMIN — ACETAMINOPHEN 650 MG: 325 TABLET ORAL at 01:41

## 2019-07-25 RX ADMIN — HEPARIN SODIUM 4000 UNITS: 5000 INJECTION, SOLUTION INTRAVENOUS; SUBCUTANEOUS at 15:56

## 2019-07-25 RX ADMIN — INSULIN LISPRO 3 UNITS: 100 INJECTION, SOLUTION INTRAVENOUS; SUBCUTANEOUS at 11:30

## 2019-07-25 RX ADMIN — ESMOLOL HYDROCHLORIDE 40 MG: 10 INJECTION INTRAVENOUS at 15:35

## 2019-07-25 RX ADMIN — HYDROCHLOROTHIAZIDE 25 MG: 25 TABLET ORAL at 17:09

## 2019-07-25 NOTE — PROGRESS NOTES
0730: Bedside shift change report given to Aidan Rubi RN (oncoming nurse) by dAelaida Gonzalez (offgoing nurse). Report included the following information SBAR, Kardex, ED Summary, Procedure Summary, Intake/Output, MAR, Accordion, Recent Results and Med Rec Status. 0800: Primary Nurse Yajaira Barkley and Violet Gomez RN performed a dual skin assessment on this patient No impairment noted  Elliot score is 19    1440: TRANSFER - OUT REPORT:    Verbal report given to Mervat Calle RN(name) on Linda Solis  being transferred to Angio(unit) for ordered procedure       Report consisted of patients Situation, Background, Assessment and   Recommendations(SBAR). Information from the following report(s) SBAR, Kardex, ED Summary, Procedure Summary, Intake/Output, MAR, Accordion and Recent Results was reviewed with the receiving nurse. Lines:   Peripheral IV 07/19/19 Left Antecubital (Active)   Site Assessment Clean, dry, & intact 7/25/2019 12:00 PM   Phlebitis Assessment 0 7/25/2019 12:00 PM   Infiltration Assessment 0 7/25/2019 12:00 PM   Dressing Status Clean, dry, & intact 7/25/2019 12:00 PM   Dressing Type Transparent;Tape 7/25/2019 12:00 PM   Hub Color/Line Status Pink;Capped 7/25/2019 12:00 PM   Action Taken Open ports on tubing capped 7/25/2019 12:00 PM   Alcohol Cap Used Yes 7/25/2019 12:00 PM       Peripheral IV 07/24/19 Anterior; Left Forearm (Active)   Site Assessment Clean, dry, & intact 7/25/2019 12:00 PM   Phlebitis Assessment 0 7/25/2019 12:00 PM   Infiltration Assessment 0 7/25/2019 12:00 PM   Dressing Status Clean, dry, & intact 7/25/2019 12:00 PM   Dressing Type Transparent;Tape 7/25/2019 12:00 PM   Hub Color/Line Status Blue; Infusing 7/25/2019 12:00 PM   Action Taken Open ports on tubing capped 7/25/2019 12:00 PM   Alcohol Cap Used Yes 7/25/2019 12:00 PM        Opportunity for questions and clarification was provided.       Patient transported with:   Monitor  Registered Nurse  Tech

## 2019-07-25 NOTE — PROGRESS NOTES
Neurointerventional Surgery Progress Note  Maddi Linton AGACNP-BC  [phone]    Admit Date: 2019   LOS: 6 days        Daily Progress Note: 2019    POD: 5 diagnostic cerebral angiogram by Dr. Sandrita Yañez    HPI: Jose Jacobs is a 80 y.o. female with a PMH significant for HTN, Type 1 DM, hx of acute renal failure, and atrial fibrillation who [place] [facility]  due to complaints of headache and weakness. Patient reported on 19 she started vomiting and then subsequently developed a headache the next day. She tried taking tylenol for her headache with some relief. She reported a hx of a minor headache, but this headache was worse than usual for her. The patient also reported weakness x 2 days causing a few falls at home. She denied any LOC. She reported her blood glucose has been elevated over the past 5 days, more so than usual. In the ED, a stat head CT was performed which showed SAH in the left sylvian fissure and basal cisterns. 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 She was transferred to [facility] for higher level of care. She is not a smoker and reports her uncle  from a cerebral aneurysm. Subjective:     Patient is feeling tired today. She feels more tired than any other day thus far. No neurological changes or issues overnight. Plans for angio with possible verapamil treatment later today. She denies any vision changes, dizziness, vomiting, abdominal pain, chest pain, SOB, numbness, tingling, or weakness.      Current Facility-Administered Medications   Medication Dose Route Frequency Provider Last Rate Last Dose    labetalol (NORMODYNE;TRANDATE) 20 mg/4 mL (5 mg/mL) injection 20 mg  20 mg IntraVENous Q1H PRN Kristle Arroyo NP   20 mg at 19 0647    hydrALAZINE (APRESOLINE) 20 mg/mL injection 10 mg  10 mg IntraVENous Q1H PRN Rolando Olson NP   10 mg at 19 1251    metoprolol tartrate (LOPRESSOR) tablet 25 mg  25 mg Oral BID Lavonne Santos MD   25 mg at 07/25/19 0900    metoprolol (LOPRESSOR) injection 5 mg  5 mg IntraVENous Q15MIN PRN Garfield Narvaez NP   5 mg at 07/24/19 1631    lactated Ringers infusion  125 mL/hr IntraVENous CONTINUOUS Tequila Goncalves  mL/hr at 07/24/19 2101 125 mL/hr at 07/24/19 2101    insulin lispro (HUMALOG) injection   SubCUTAneous AC&HS Belle Scott MD   3 Units at 07/25/19 1130    pantoprazole (PROTONIX) tablet 40 mg  40 mg Oral ACB Belle Scott MD   40 mg at 07/25/19 0647    levETIRAcetam (KEPPRA) tablet 500 mg  500 mg Oral Q12H Bryan Terrell MD   500 mg at 07/25/19 0900    polyethylene glycol (MIRALAX) packet 17 g  17 g Oral DAILY PRN Tequila Goncalves, NP        docusate sodium (COLACE) capsule 100 mg  100 mg Oral DAILY Tequila Goncalves NP   100 mg at 07/25/19 0900    niCARdipine (CARDENE) 25 mg in 0.9% sodium chloride 250 mL infusion  0-15 mg/hr IntraVENous TITRATE Concepcion Schumacher MD 25 mL/hr at 07/24/19 0725 2.5 mg/hr at 07/24/19 0725    ondansetron (ZOFRAN) injection 4 mg  4 mg IntraVENous Q6H PRN Lencho Mckinney MD   4 mg at 07/23/19 1301    naloxone (NARCAN) injection 0.4 mg  0.4 mg IntraVENous PRN Lencho Mckinney MD        acetaminophen (TYLENOL) tablet 650 mg  650 mg Oral Q4H PRN Lencho Mckinney MD   650 mg at 07/25/19 0141    Or    acetaminophen (TYLENOL) solution 650 mg  650 mg Per NG tube Q4H PRN Lencho Mckinney MD        Or   [address] [place] [address] acetaminophen (TYLENOL) suppository 650 mg  650 mg Rectal Q4H PRN Lencho Mckinney MD        glucose chewable tablet 16 g  4 Tab Oral PRN Lencho Mckinney MD        dextrose (D50W) injection syrg 12.5-25 g  25-50 mL IntraVENous PRN Lencho Mckinney MD        glucagon (GLUCAGEN) injection 1 mg  1 mg IntraMUSCular PRN Lencho Mckinney MD        niMODipine (NIMOTOP) capsule 60 mg  60 mg Oral Q4H Lack, Henrene Sacks, NP   60 mg at 07/25/19 0824        No Known Allergies    Review of Systems:  Pertinent items are noted in the History of Present Illness. Objective:     Visit Vitals  /81 (BP 1 Location: Right arm, BP Patient Position: At rest)   Pulse 71   Temp 98 °F (36.7 °C)   Resp 14   Ht 5' 4\" (1.626 m) Comment: From documentation on 7/19/19   Wt 217 lb 13 oz (98.8 kg)   SpO2 92%   BMI 37.39 kg/m²    O2 Flow Rate (L/min): 2 l/min O2 Device: Room air         Vitals:    07/25/19 0900 07/25/19 1000 07/25/19 1100 07/25/19 1200   BP: 114/55 139/59 (!) 146/102 181/81   Pulse: 64 (!) 114 (!) 131 71   Resp: 15 14 13 14   Temp:    98 °F (36.7 °C)   SpO2: 91% 92% 94% 92%   Weight:       Height:            Physical Exam:  GENERAL: alert, cooperative, no distress, appears stated age  LUNG: clear to auscultation bilaterally  HEART: regular rate and rhythm, S1, S2 normal, no murmur, click, rub or gallop  EXTREMITIES:  extremities normal, atraumatic, no cyanosis, mild non-pitting edema in bilateral hands. 2+ distal pulses bilaterally. SKIN: no rash or abnormalities. Skin warm to touch. Right groin site clean, dry, and intact. No hematoma, bruising, or bleeding at site. Neurologic Exam:  Mental Status:  Alert and oriented x 4. Appropriate affect, mood and behavior. Language:    Normal fluency, repetition, comprehension and naming. Cranial Nerves:        Pupils equal, round and reactive to light. Visual fields full to confrontation. Extraocular movements intact. Facial sensation intact. Full facial strength, no asymmetry. Hearing grossly intact bilaterally. No dysarthria. Tongue protrudes to midline, palate elevates symmetrically. Shoulder shrug 5/5 bilaterally. Motor:    No pronator drift. Bulk and tone normal.      5/5 power in all extremities proximally and distally. No involuntary movements. Sensation:    Sensation intact throughout to light touch. Coordination & Gait: No ataxia with FTN and HTS testing. Gait deferred.      24 hour results:    Recent Results (from the past 24 hour(s))   GLUCOSE, POC    Collection Time: 07/24/19  4:50 PM   Result Value Ref Range    Glucose (POC) 205 (H) 65 - 100 mg/dL    Performed by Pop Lazo, POC    Collection Time: 07/24/19  9:06 PM   Result Value Ref Range    Glucose (POC) 225 (H) 65 - 100 mg/dL    Performed by [place] Number, POC    Collection Time: 07/25/19  6:46 AM   Result Value Ref Range    Glucose (POC) 169 (H) 65 - 100 mg/dL    Performed by [place] Number, POC    Collection Time: 07/25/19 11:28 AM   Result Value Ref Range    Glucose (POC) 201 (H) 65 - 100 mg/dL    Performed by SandraSend the Trendch           Imaging:  CT of Head on 7/19/2019 shows  IMPRESSION:   Acute subarachnoid hemorrhage in the left sylvian fissure and basal cisterns. No  evidence for acute infarct. CTA of the Head and Neck on 7/19/2019 shows  IMPRESSION:  Severe atherosclerotic change in the cervical internal carotid artery. Greater  than 90% stenosis of the proximal left internal carotid artery.     Moderate stenosis right internal carotid artery approximately 50%.     No intracranial aneurysm. Minimal irregularity of M2 segments may be related to  mild vasospasm. Stable degree of subarachnoid hemorrhage when compared to the CT  of the head performed earlier in the day. CT of Head on 7/20/2019 shows  IMPRESSION:  1. Stable small to moderate volume subarachnoid hemorrhage as described above,  largest components in the suprasellar cistern, prepontine cistern, and left sylvian fissure. CT of Head on 7/22/2019 shows  IMPRESSION  IMPRESSION: Decreased subarachnoid hemorrhage. No new intracranial hemorrhage.       Assessment:     Principal Problem:    SAH (subarachnoid hemorrhage) ([facility]) (7/20/2019)        Plan:   SAH, Savage Mcdonald 1, Perez Grade 2, PBD day 8   - as seen on CT/CTA, no aneurysm seen on CTA   - neuro exam non-focal   - s/p cerebral angiogram on 7/20 which did not show an obvious aneurysm to explain blood pattern, 1 mm x 1 mm triangular outpouching on distal right NGUYEN trunk; ? Infundibulum, tiny aneurysm seen on angiogram, patient will need a repeat angiogram in one week. - Repeat Head CT 7/22, shows a decrease in [facility]   - most likely a perimesencephalic SAH              - Continue Nimotop day 6 of 21 for prevention of delayed cerebral ischemia               - Continue Keppra 500 BID for seizure ppx              - LR at 125 ml/hr to maintain euvolemia   - continue TCD's daily               - Strict I&O's              - TTE shows EF 56-60%, moderate pulmonary HTN, trace mitral valve regurgitation, trace tricuspid valve regurgitation               - every 2 hour neuro checks               - SBP goals 110-160     Hx of HTN   - SBP goal 110-160    - Cardene PRN, hydralazine/Labetalol PRN   - Hospitalist following    DM type 1   - sliding scale insulin and point of care glucose checks   - Management per hospitalist       Plan d/w Dr. Beto Saldivar, ICU RN, patient. Continue to monitor in ICU.       Graham Norris NP

## 2019-07-25 NOTE — PROGRESS NOTES
1630: TRANSFER - IN REPORT:    Verbal report received from William Estevez RN(name) on Roman Draper  being received from Angio(unit) for routine post - op    Report consisted of patients Situation, Background, Assessment and   Recommendations(SBAR). Information from the following report(s) SBAR, Kardex, ED Summary, Procedure Summary, Intake/Output, MAR, Accordion, Recent Results and Med Rec Status was reviewed with the receiving nurse. Opportunity for questions and clarification was provided. Assessment completed upon patients arrival to unit and care assumed.

## 2019-07-25 NOTE — PROGRESS NOTES
Hospitalist Progress Note    NAME: [facility]   :  1950   MRN:  [id number]       Assessment / Plan:  1.  Acute subarachnoid hemorrhage,  stable, plan for cerebral angiography on   -S/P cerebral angiogram, no obvious aneurysm   - Optimize BP control, currently on cardene gtt, keep SBP less than 140   - Continue Nimodipine  - continue keppra   - PT eval pending  - CT head with \" Stable small to moderate volume subarachnoid hemorrhage \"  - neurochecks, seizure precaution  - appreciate NS and BERRY input  CT shows stable SAH    angio on 19 shows no aneurysm, continue close monitoring, keep BP below 180 mmHg  2.  Acute kidney injury,stable   - likely prerenal due to recent GI loss  - improving  - continue IV hydration  - avoid nephrotoxic meds     3.  Diabetes mellitus type 2,with hyperglycemia ; still hyperglycemic    - better controlled  - continue SSNI, accuchecks, monitor   Resume lower dose of Lantus, she is eating better, required SSI,  Increased Lantus 18 BID         4.  Accelerated hypertension. BP stable   -tight BP control to keep SBP less than 140  -  Cardene gtt off   - monitor   - 5. Afib: BB no anticoagulation,   Code status: full  DVT prophylaxis: SCD     Care Plan discussed with: Patient/Family  Disposition: Home w/Family  Subjective:     Chief Complaint / Reason for Physician Visit  \" very tired  \". Discussed with RN events overnight. Review of Systems:  Symptom Y/N Comments  Symptom Y/N Comments   Fever/Chills    Chest Pain     Poor Appetite    Edema     Cough    Abdominal Pain     Sputum    Joint Pain     SOB/JAMES    Pruritis/Rash     Nausea/vomit    Tolerating PT/OT     Diarrhea    Tolerating Diet     Constipation    Other       Could NOT obtain due to:      Objective:     VITALS:   Last 24hrs VS reviewed since prior progress note.  Most recent are:  Patient Vitals for the past 24 hrs:   Temp Pulse Resp BP SpO2   19 1323  74 16 164/75 97 %   19 1300  76 14 188/84 95 %   07/25/19 1200 98 °F (36.7 °C) 71 14 181/81 92 %   07/25/19 1100  (!) 131 13 (!) 146/102 94 %   07/25/19 1000  (!) 114 14 139/59 92 %   07/25/19 0900  64 15 114/55 91 %   07/25/19 0800 97.7 °F (36.5 °C) 81 14 163/74 94 %   07/25/19 0700  65 16 156/64 94 %   07/25/19 0600  68 20 165/62 96 %   07/25/19 0500  62 15 125/58 92 %   07/25/19 0400 98 °F (36.7 °C) 63 15 154/72 93 %   07/25/19 0300  65 15 140/64 92 %   07/25/19 0200  69 16 146/67 93 %   07/25/19 0100  66 18 160/75 92 %   07/25/19 0000 98.2 °F (36.8 °C) (!) 115 21 154/72 94 %   07/24/19 2300  (!) 106 18 (!) 144/92 95 %   07/24/19 2200  66 18 156/72 93 %   07/24/19 2100  68 19 151/75 95 %   07/24/19 2000 98.7 °F (37.1 °C) 66 19 128/61 94 %   07/24/19 1930  70 18 155/71 95 %   07/24/19 1900  (!) 114 15 (!) 161/91 94 %   07/24/19 1830  62 15 142/71 94 %   07/24/19 1800  65 16 139/62 93 %   07/24/19 1730  82 19 155/74 96 %   07/24/19 1700  74 17 174/82 94 %   07/24/19 1630  82 15 169/84 96 %   07/24/19 1600 98.5 °F (36.9 °C) 69 17 146/72 95 %   07/24/19 1530  65 15 135/69 94 %   07/24/19 1430  (!) 117 12 141/75 95 %   07/24/19 1400  94 17 142/82 95 %       Intake/Output Summary (Last 24 hours) at 7/25/2019 1357  Last data filed at 7/25/2019 1323  Gross per 24 hour   Intake 3000 ml   Output 4900 ml   Net -1900 ml        PHYSICAL EXAM:  General: WD, WN. Alert, cooperative, no acute distress    EENT:  EOMI. Anicteric sclerae. MMM  Resp:  CTA bilaterally, no wheezing or rales. No accessory muscle use  CV:  Regular  rhythm,  No edema  GI:  Soft, Non distended, Non tender.  +Bowel sounds  Neurologic:  Alert and oriented X 3, normal speech,   Psych:   Good insight. Not anxious nor agitated  Skin:  No rashes.   No jaundice    Reviewed most current lab test results and cultures  YES  Reviewed most current radiology test results   YES  Review and summation of old records today    NO  Reviewed patient's current orders and MAR    YES  PMH/SH reviewed - no change compared to H&P  ________________________________________________________________________  Care Plan discussed with:    Comments   Patient     Family      RN     Care Manager     Consultant                        Multidiciplinary team rounds were held today with , nursing, pharmacist and clinical coordinator. Patient's plan of care was discussed; medications were reviewed and discharge planning was addressed. ________________________________________________________________________  Total NON critical care TIME: 35 Minutes    Total CRITICAL CARE TIME Spent:   Minutes non procedure based      Comments   >50% of visit spent in counseling and coordination of care     ________________________________________________________________________  Jerome Easley MD     Procedures: see electronic medical records for all procedures/Xrays and details which were not copied into this note but were reviewed prior to creation of Plan. LABS:  I reviewed today's most current labs and imaging studies.   Pertinent labs include:  Recent Labs     07/24/19 0912   WBC 8.7   HGB 11.5   HCT 36.3        Recent Labs     07/24/19 0912      K 3.8      CO2 26   *   BUN 23*   CREA 1.24*   CA 9.0       Signed: Jerome Easley MD

## 2019-07-25 NOTE — PROGRESS NOTES
1930:  Bedside and Verbal shift change report given to Oksana Hubbard RN (oncoming nurse) by Ayaan Hess RN (offgoing nurse). Report included the following information SBAR, Kardex, Procedure Summary, Intake/Output, MAR, Accordion, Recent Results, Med Rec Status and Cardiac Rhythm sinus rhythm. 2015:  Patient had 5 minute run of A-Fib/A-Flutter. Converted to sinus rhythm on her own. Shift summary:  Uneventful shift. One run of A-Fib/A-Flutter, converted without medication. Tylenol x1 dose for headache. Voiding in BSC. NPO since midnight for angiogram.    0730: Bedside and Verbal shift change report given to Wandy Cunningham RN (oncoming nurse) by Oksana Hubbard RN (offgoing nurse). Report included the following information SBAR, Kardex, Procedure Summary, Intake/Output, MAR, Accordion, Recent Results, Med Rec Status and Cardiac Rhythm sinus rhythm.

## 2019-07-25 NOTE — DIABETES MGMT
[facility]    DTC Progress Note    Recommendations/ Comments: Pt NPO for test and basal insulin dose cut in 1/2 and meal time insulin held to prevent hypoglycemia. Noted pt was 201 mg/dl at 1100 and covered with correction scale  Once po intake is resumed would benefit from returning to new doses started last night prior to NPO status. Current hospital DM medication: lispro insulin correction scale    Chart reviewed on Cristopher Slater. Patient is a 80 y.o. female with known DM on NPH and Lispro sliding scale at home. Dose unknown    A1c:   Lab Results   Component Value Date/Time    Hemoglobin A1c 8.9 (H) 07/21/2019 08:00 AM    Hemoglobin A1c 8.2 (H) 02/10/2013 03:19 AM       Recent Glucose Results:   Lab Results   Component Value Date/Time    GLUCPOC 201 (H) 07/25/2019 11:28 AM    GLUCPOC 169 (H) 07/25/2019 06:46 AM    GLUCPOC 225 (H) 07/24/2019 09:06 PM        Lab Results   Component Value Date/Time    Creatinine 1.24 (H) 07/24/2019 09:12 AM     Estimated Creatinine Clearance: 50.2 mL/min (A) (based on SCr of 1.24 mg/dL (H)). Active Orders   Diet    DIET NPO        PO intake: No data found. Will continue to follow as needed.     Thank you          Time spent: 6 min

## 2019-07-25 NOTE — PROCEDURES
NEUROINTERVENTIONAL SURGERY POST-PROCEDURE NOTE    PROCEDURE:  Cerebral angiogram  3D rotational angiogram:  RCCA injections  US guided arterial access  Angioseal    VESSEL(S) STUDIED:  1. RCCA  2. R SUBCLAV (RVA)  3. LCCA  4. LVA VESSEL(S) TREATED:  1. none      PRELIMINARY REPORT & DISPOSITION:   No aneurysm, AVM or fistula. Minimal right NGUYEN A1 vasospasm. Occluded LICA in neck with reconstitution by vaso vasorum and ophthalmic. Left hemisphere fed by large caliber left PCOM. Intracranial athero. Left posterior M3 stenosis, not flow limiting. COMPLICATIONS:  None immediate    FOLLOW-UP:  -180  OK to start Lovenox prophylaxis and 81 mg ASA DATE OF SERVICE:  7/25/2019 4:03 PM     ATTENDING SURGEON(S):  Kandice Perez MD      ANESTHESIA:   MAC    MEDICATIONS:   See nursing record  90 mL Isovue 300  2000U heparin IV    PUNCTURE SITE:  Right common femoral artery. Arteriotomy closed with 6F Angioseal.   Flat x 2 hours.

## 2019-07-25 NOTE — ANESTHESIA PREPROCEDURE EVALUATION
Relevant Problems   No relevant active problems       Anesthetic History   No history of anesthetic complications  PONV          Review of Systems / Medical History  Patient summary reviewed, nursing notes reviewed and pertinent labs reviewed    Pulmonary  Within defined limits                 Neuro/Psych   Within defined limits           Cardiovascular  Within defined limits  Hypertension        Dysrhythmias : atrial fibrillation           GI/Hepatic/Renal  Within defined limits         PUD     Endo/Other  Within defined limits  Diabetes    Obesity     Other Findings              Physical Exam    Airway  Mallampati: II  TM Distance: > 6 cm  Neck ROM: normal range of motion   Mouth opening: Normal     Cardiovascular  Regular rate and rhythm,  S1 and S2 normal,  no murmur, click, rub, or gallop             Dental  No notable dental hx       Pulmonary  Breath sounds clear to auscultation               Abdominal  GI exam deferred       Other Findings            Anesthetic Plan    ASA: 3  Anesthesia type: MAC            Anesthetic plan and risks discussed with: Patient

## 2019-07-25 NOTE — ANESTHESIA POSTPROCEDURE EVALUATION
* No procedures listed *. MAC    Anesthesia Post Evaluation      Multimodal analgesia: multimodal analgesia used between 6 hours prior to anesthesia start to PACU discharge  Patient location during evaluation: bedside  Patient participation: waiting for patient participation  Level of consciousness: awake  Pain management: adequate  Airway patency: patent  Anesthetic complications: no  Cardiovascular status: acceptable  Respiratory status: unassisted  Hydration status: acceptable  Comments: Post-Anesthesia Evaluation and Assessment    I have evaluated the patient and they are ready for PACU discharge. Patient: Jose Jacobs MRN: [id number]  SSN: [id number]   YOB: 1950  Age: 80 y.o. Sex: female      Cardiovascular Function/Vital Signs  /79 (BP 1 Location: Right arm)   Pulse 63   Temp 36.7 °C (98 °F)   Resp 16   Ht 5' 4\" (1.626 m)   Wt 98.8 kg (217 lb 13 oz)   SpO2 99%   BMI 37.39 kg/m²     Patient is status post * No anesthesia type entered * anesthesia for * No procedures listed *. Nausea/Vomiting: None    Postoperative hydration reviewed and adequate. Pain:  Pain Scale 1: Numeric (0 - 10) (07/25/19 1440)  Pain Intensity 1: 0 (07/25/19 1200)   Managed    Neurological Status:   Neuro  Neurologic State: Alert; Appropriate for age (07/25/19 1631)  Orientation Level: Oriented X4 (07/25/19 1631)  Cognition: Appropriate decision making (07/25/19 1631)  Speech: Clear (07/25/19 1630)  Assessment L Pupil: Brisk (07/25/19 1630)  Size L Pupil (mm): 2 (07/25/19 1630)  Assessment R Pupil: Brisk (07/25/19 1630)  Size R Pupil (mm): 2 (07/25/19 1630)  LUE Motor Response: Purposeful;Spontaneous  (07/25/19 1630)  LLE Motor Response: Purposeful;Spontaneous  (07/25/19 1630)  RUE Motor Response: Purposeful;Spontaneous  (07/25/19 1630)  RLE Motor Response: Purposeful;Spontaneous  (07/25/19 1630)   At baseline    Mental Status, Level of Consciousness: Alert and  oriented to person, place, and time    Pulmonary Status:   O2 Device: Room air (07/25/19 1610)   Adequate oxygenation and airway patent    Complications related to anesthesia: None    Post-anesthesia assessment completed. No concerns    Signed By: Maria Teresa Toscano MD    July 25, 2019                   Vitals Value Taken Time   /87 7/25/2019  4:32 PM   Temp     Pulse 64 7/25/2019  4:36 PM   Resp 19 7/25/2019  4:36 PM   SpO2 96 % 7/25/2019  4:36 PM   Vitals shown include unvalidated device data.

## 2019-07-25 NOTE — PROGRESS NOTES
Transitions of Care  1: Patient discussed in IDR rounds  2: plan today is for cerebral angiogram with possible balloon angioplasty with possible vasospasm treatment     Rosalinda Mclaughlin RN,CRM

## 2019-07-25 NOTE — PROGRESS NOTES
TRANSFER - OUT REPORT:    Verbal report given to Severo(name) on Yinka Carter  being transferred to ICU(unit) for routine progression of care       Report consisted of patients Situation, Background, Assessment and   Recommendations(SBAR). Information from the following report(s) Kardex and Procedure Summary was reviewed with the receiving nurse. Lines:   Peripheral IV 07/19/19 Left Antecubital (Active)   Site Assessment Clean, dry, & intact 7/25/2019 12:00 PM   Phlebitis Assessment 0 7/25/2019 12:00 PM   Infiltration Assessment 0 7/25/2019 12:00 PM   Dressing Status Clean, dry, & intact 7/25/2019 12:00 PM   Dressing Type Transparent;Tape 7/25/2019 12:00 PM   Hub Color/Line Status Pink;Capped 7/25/2019 12:00 PM   Action Taken Open ports on tubing capped 7/25/2019 12:00 PM   Alcohol Cap Used Yes 7/25/2019 12:00 PM       Peripheral IV 07/24/19 Anterior; Left Forearm (Active)   Site Assessment Clean, dry, & intact 7/25/2019 12:00 PM   Phlebitis Assessment 0 7/25/2019 12:00 PM   Infiltration Assessment 0 7/25/2019 12:00 PM   Dressing Status Clean, dry, & intact 7/25/2019 12:00 PM   Dressing Type Transparent;Tape 7/25/2019 12:00 PM   Hub Color/Line Status Blue; Infusing 7/25/2019 12:00 PM   Action Taken Open ports on tubing capped 7/25/2019 12:00 PM   Alcohol Cap Used Yes 7/25/2019 12:00 PM        Opportunity for questions and clarification was provided.       Patient transported with:   Registered Nursex2  monitor

## 2019-07-25 NOTE — ADVANCED PRACTICE NURSE
Plan for cerebral angiogram with possible balloon angioplasty with possible vasospasm treatment with Dr. Duke Trujillo. Patient with type II diabetes. Will likely be NPO until well after lunch time today ( and has been since midnight). Will give 1/2 dose of AM Lantus this morning ( 9 units) and hold mealtime insulin. May take all other meds with sips of water. This procedure has been fully reviewed with the patient and written informed consent has been obtained.     Grover Ramey NP   NIS

## 2019-07-25 NOTE — PROGRESS NOTES
Physical Therapy Note  7/25/19    Chart reviewed. Pt cleared for mobility by RN. Received pt sleeping in bed - easily wakes to voice however politely requesting to defer PT session, citing feeling \"weak and just not well\". Noted to be tachycardic at rest to 130s. Additionally pt anticipating repeat angio today with possible treatment of R NGUYEN vasospasm. Will defer per pt request and continue to follow for mobility progression.     Thank you,  Hui Holguin, PT, DPT

## 2019-07-25 NOTE — PROGRESS NOTES
Occupational Therapy 1127 -   07.25.2019    Chart reviewed in prep for OT treatment, RN cleared patient to be seen. Patient reports feeling weak and \"unwell\" and politely requesting to defer therapy at this time. Noted plan for angio today with Dr. Mele Reed. Will defer at this time per patient request and continue to follow. Thank you. Isabela Giordano MS, OTR/L

## 2019-07-26 ENCOUNTER — APPOINTMENT (OUTPATIENT)
Dept: VASCULAR SURGERY | Age: 69
DRG: 065 | End: 2019-07-26
Attending: NURSE PRACTITIONER
Payer: COMMERCIAL

## 2019-07-26 LAB
BASILAR ARTERY EDV: 48 CM/S
BASILAR ARTERY MEAN VEL: 73 CM/S
BASILAR ARTERY PSV: 122 CM/S
GLUCOSE BLD STRIP.AUTO-MCNC: 117 MG/DL (ref 65–100)
GLUCOSE BLD STRIP.AUTO-MCNC: 147 MG/DL (ref 65–100)
GLUCOSE BLD STRIP.AUTO-MCNC: 176 MG/DL (ref 65–100)
GLUCOSE BLD STRIP.AUTO-MCNC: 184 MG/DL (ref 65–100)
GLUCOSE BLD STRIP.AUTO-MCNC: 245 MG/DL (ref 65–100)
GLUCOSE BLD STRIP.AUTO-MCNC: 254 MG/DL (ref 65–100)
LEFT MCA 1 EDV: 25 CM/S
LEFT MCA 1 MEAN VEL: 39 CM/S
LEFT MCA 1 PSV: 67 CM/S
LEFT PCA 1 EDV: 43 CM/S
LEFT PCA 1 MEAN VEL: 66 CM/S
LEFT PCA 1 PSV: 113 CM/S
LEFT VERTEBRAL EDV TCD: 38 CM/S
LEFT VERTEBRAL MEAN VEL: 55 CM/S
LEFT VERTEBRAL PSV TCD: 88 CM/S
RIGHT ACA EDV: 99 CM/S
RIGHT ACA LINDEGAARD RATIO: 4
RIGHT ACA MEAN VEL: 132 CM/S
RIGHT ACA PSV: 198 CM/S
RIGHT EX ICA EDV: 20 CM/S
RIGHT EX ICA MEAN VEL: 33 CM/S
RIGHT EX ICA PSV: 60 CM/S
RIGHT ICA EDV: 41 CM/S
RIGHT ICA MEAN VEL: 63 CM/S
RIGHT ICA PSV: 108 CM/S
RIGHT LINDEGAARD RATIO: 1.7
RIGHT MCA 1 EDV: 37 CM/S
RIGHT MCA 1 MEAN VEL: 56 CM/S
RIGHT MCA 1 PSV: 95 CM/S
RIGHT PCA 1 EDV: 40 CM/S
RIGHT PCA 1 MEAN VEL: 60 CM/S
RIGHT PCA 1 PSV: 101 CM/S
SERVICE CMNT-IMP: ABNORMAL

## 2019-07-26 PROCEDURE — 74011250637 HC RX REV CODE- 250/637: Performed by: NURSE PRACTITIONER

## 2019-07-26 PROCEDURE — 82962 GLUCOSE BLOOD TEST: CPT

## 2019-07-26 PROCEDURE — 74011636637 HC RX REV CODE- 636/637: Performed by: INTERNAL MEDICINE

## 2019-07-26 PROCEDURE — 65610000006 HC RM INTENSIVE CARE

## 2019-07-26 PROCEDURE — 74011636637 HC RX REV CODE- 636/637: Performed by: NURSE PRACTITIONER

## 2019-07-26 PROCEDURE — 93886 INTRACRANIAL COMPLETE STUDY: CPT

## 2019-07-26 PROCEDURE — 74011000250 HC RX REV CODE- 250: Performed by: NURSE PRACTITIONER

## 2019-07-26 PROCEDURE — 74011250636 HC RX REV CODE- 250/636: Performed by: NURSE PRACTITIONER

## 2019-07-26 PROCEDURE — 97535 SELF CARE MNGMENT TRAINING: CPT

## 2019-07-26 PROCEDURE — 74011250637 HC RX REV CODE- 250/637: Performed by: INTERNAL MEDICINE

## 2019-07-26 PROCEDURE — 74011250637 HC RX REV CODE- 250/637: Performed by: FAMILY MEDICINE

## 2019-07-26 PROCEDURE — 77030038269 HC DRN EXT URIN PURWCK BARD -A

## 2019-07-26 PROCEDURE — 97116 GAIT TRAINING THERAPY: CPT

## 2019-07-26 RX ORDER — INSULIN GLARGINE 100 [IU]/ML
18 INJECTION, SOLUTION SUBCUTANEOUS EVERY 12 HOURS
Status: DISCONTINUED | OUTPATIENT
Start: 2019-07-26 | End: 2019-07-29 | Stop reason: HOSPADM

## 2019-07-26 RX ADMIN — ACETAMINOPHEN 650 MG: 325 TABLET ORAL at 18:31

## 2019-07-26 RX ADMIN — ASPIRIN 81 MG 81 MG: 81 TABLET ORAL at 08:38

## 2019-07-26 RX ADMIN — METOPROLOL TARTRATE 25 MG: 25 TABLET ORAL at 08:38

## 2019-07-26 RX ADMIN — NIMODIPINE 60 MG: 30 CAPSULE, LIQUID FILLED ORAL at 17:01

## 2019-07-26 RX ADMIN — PANTOPRAZOLE SODIUM 40 MG: 40 TABLET, DELAYED RELEASE ORAL at 08:38

## 2019-07-26 RX ADMIN — METOPROLOL TARTRATE 25 MG: 25 TABLET ORAL at 18:17

## 2019-07-26 RX ADMIN — NIMODIPINE 60 MG: 30 CAPSULE, LIQUID FILLED ORAL at 05:12

## 2019-07-26 RX ADMIN — NIMODIPINE 60 MG: 30 CAPSULE, LIQUID FILLED ORAL at 12:26

## 2019-07-26 RX ADMIN — HYDROCHLOROTHIAZIDE 25 MG: 25 TABLET ORAL at 08:38

## 2019-07-26 RX ADMIN — LEVETIRACETAM 500 MG: 500 TABLET, FILM COATED ORAL at 21:14

## 2019-07-26 RX ADMIN — NIMODIPINE 60 MG: 30 CAPSULE, LIQUID FILLED ORAL at 21:14

## 2019-07-26 RX ADMIN — LOSARTAN POTASSIUM 100 MG: 50 TABLET ORAL at 08:37

## 2019-07-26 RX ADMIN — METOPROLOL TARTRATE 5 MG: 5 INJECTION INTRAVENOUS at 18:41

## 2019-07-26 RX ADMIN — NIMODIPINE 60 MG: 30 CAPSULE, LIQUID FILLED ORAL at 00:22

## 2019-07-26 RX ADMIN — DOCUSATE SODIUM 100 MG: 100 CAPSULE, LIQUID FILLED ORAL at 08:38

## 2019-07-26 RX ADMIN — INSULIN LISPRO 7 UNITS: 100 INJECTION, SOLUTION INTRAVENOUS; SUBCUTANEOUS at 11:30

## 2019-07-26 RX ADMIN — INSULIN GLARGINE 18 UNITS: 100 INJECTION, SOLUTION SUBCUTANEOUS at 21:14

## 2019-07-26 RX ADMIN — INSULIN LISPRO 7 UNITS: 100 INJECTION, SOLUTION INTRAVENOUS; SUBCUTANEOUS at 18:32

## 2019-07-26 RX ADMIN — LEVETIRACETAM 500 MG: 500 TABLET, FILM COATED ORAL at 08:38

## 2019-07-26 RX ADMIN — INSULIN LISPRO 3 UNITS: 100 INJECTION, SOLUTION INTRAVENOUS; SUBCUTANEOUS at 11:30

## 2019-07-26 RX ADMIN — ACETAMINOPHEN 650 MG: 325 TABLET ORAL at 05:20

## 2019-07-26 RX ADMIN — NIMODIPINE 60 MG: 30 CAPSULE, LIQUID FILLED ORAL at 08:53

## 2019-07-26 RX ADMIN — INSULIN LISPRO 2 UNITS: 100 INJECTION, SOLUTION INTRAVENOUS; SUBCUTANEOUS at 18:32

## 2019-07-26 RX ADMIN — ENOXAPARIN SODIUM 40 MG: 40 INJECTION SUBCUTANEOUS at 17:00

## 2019-07-26 RX ADMIN — INSULIN GLARGINE 18 UNITS: 100 INJECTION, SOLUTION SUBCUTANEOUS at 11:02

## 2019-07-26 RX ADMIN — SODIUM CHLORIDE, SODIUM LACTATE, POTASSIUM CHLORIDE, AND CALCIUM CHLORIDE 125 ML/HR: 600; 310; 30; 20 INJECTION, SOLUTION INTRAVENOUS at 18:48

## 2019-07-26 RX ADMIN — SODIUM CHLORIDE, SODIUM LACTATE, POTASSIUM CHLORIDE, AND CALCIUM CHLORIDE 125 ML/HR: 600; 310; 30; 20 INJECTION, SOLUTION INTRAVENOUS at 07:25

## 2019-07-26 NOTE — PROGRESS NOTES
0730: Bedside and Verbal shift change report given to Harmony Pastrana (oncoming nurse) by Albino Mcgrath (offgoing nurse). Report included the following information SBAR, Kardex, ED Summary, Procedure Summary, MAR, Recent Results and Cardiac Rhythm NSR to Afib/Aflutter. 0800: Shift assessment completed per flowsheet. 0930: PT at bedside to work with patient. 1045: Participated in ID rounds. Patient condition and plan of care discussed. New orders received. 1200: Reassessment completed per flowsheet. 1600: Reassessment completed per flowsheet. 1841: Patient tachycardic in Afib/A Flutter. -160. PRN metoprolol give. Will continue to monitor patient's blood pressure and heart rate. 1905: Patient converted out of A fib/A flutter to NSR at 66. Will continue to monitor heart rate and blood pressure. Bedside and Verbal shift change report given to Adrian Keller (oncoming nurse) by Harmony Pastrana (offgoing nurse). Report included the following information SBAR, Kardex, ED Summary, Procedure Summary, MAR, Recent Results and Cardiac Rhythm NSR to Afib/A flutter.

## 2019-07-26 NOTE — PROGRESS NOTES
Neurointerventional Surgery Progress Note  Ariel Roberts AGACNP-BC  [phone]    Admit Date: 2019   LOS: 7 days        Daily Progress Note: 2019    POD: 6 diagnostic cerebral angiogram by Dr. Jeannine Pruitt            1 diagnostic cerebral angiogram by Dr. Giovanni Jesus     HPI: Keya Gonsales is a 80 y.o. female with a PMH significant for HTN, Type 1 DM, hx of acute renal failure, and atrial fibrillation who presented to [facility] ED on  due to complaints of headache and weakness. Patient reported on 19 she started vomiting and then subsequently developed a headache the next day. She tried taking tylenol for her headache with some relief. She reported a hx of a minor headache, but this headache was worse than usual for her. The patient also reported weakness x 2 days causing a few falls at home. She denied any LOC. She reported her blood glucose has been elevated over the past 5 days, more so than usual. In the ED, a stat head CT was performed which showed SAH in the left sylvian fissure and basal cisterns. 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 She was transferred to [facility] for higher level of care. She is not a smoker and reports her uncle  from a cerebral aneurysm. Subjective:     Patient is feeling better today. No acute neurological events overnight. Sitting up in chair, has worked with occupational and physical therapy today. Feels less tired than yesterday. She denies any vision changes, dizziness, vomiting, abdominal pain, chest pain, SOB, numbness, tingling, or weakness.      Current Facility-Administered Medications   Medication Dose Route Frequency Provider Last Rate Last Dose    insulin glargine (LANTUS) injection 18 Units  18 Units SubCUTAneous Q12H Sergio Fink MD        losartan (COZAAR) tablet 100 mg  100 mg Oral DAILY Beckie Valiente NP   100 mg at 19 0423    And    hydroCHLOROthiazide (HYDRODIURIL) tablet 25 mg  25 mg Oral DAILY aLtonya Moose, NP   25 mg at 07/26/19 2974    insulin lispro (HUMALOG) injection 7 Units  7 Units SubCUTAneous TIDAC Latonya Maxose, NP   7 Units at 07/26/19 1130    enoxaparin (LOVENOX) injection 40 mg  40 mg SubCUTAneous Q24H Penny Cornelius ARGUELLO, NP   40 mg at 07/25/19 1712    aspirin chewable tablet 81 mg  81 mg Oral DAILY Latonya Maxose, NP   81 mg at 07/26/19 4895    labetalol (NORMODYNE;TRANDATE) 20 mg/4 mL (5 mg/mL) injection 20 mg  20 mg IntraVENous Q1H PRN Angie Arroyo NP   20 mg at 07/25/19 0647    hydrALAZINE (APRESOLINE) 20 mg/mL injection 10 mg  10 mg IntraVENous Q1H PRN Nathan Benson NP   10 mg at 07/23/19 1251    metoprolol tartrate (LOPRESSOR) tablet 25 mg  25 mg Oral BID Olman Wilcox MD   25 mg at 07/26/19 0838    metoprolol (LOPRESSOR) injection 5 mg  5 mg IntraVENous Q15MIN PRN Nathan Benson NP   5 mg at 07/25/19 1319    lactated Ringers infusion  125 mL/hr IntraVENous CONTINUOUS Brenda Goncalves  mL/hr at 07/26/19 0725 125 mL/hr at 07/26/19 0725    insulin lispro (HUMALOG) injection   SubCUTAneous AC&HS Bhupinder Hall MD   3 Units at 07/26/19 1130    pantoprazole (PROTONIX) tablet 40 mg  40 mg Oral ACB Bhupinder Hall MD   40 mg at 07/26/19 0838    levETIRAcetam (KEPPRA) tablet 500 mg  500 mg Oral Q12H Dwight Vazquez MD   500 mg at 07/26/19 0838    polyethylene glycol (MIRALAX) packet 17 g  17 g Oral DAILY PRN Brenda Goncalves NP        docusate sodium (COLACE) capsule 100 mg  100 mg Oral DAILY Brenda Goncalves NP   100 mg at 07/26/19 0838    niCARdipine (CARDENE) 25 mg in 0.9% sodium chloride 250 mL infusion  0-15 mg/hr IntraVENous TITRATE Jonel Schwartz MD 25 mL/hr at 07/24/19 0725 2.5 mg/hr at 07/24/19 0725    ondansetron (ZOFRAN) injection 4 mg  4 mg IntraVENous Q6H PRN Liz Evans MD   4 mg at 07/23/19 1301    naloxone (NARCAN) injection 0.4 mg  0.4 mg IntraVENous PRN Liz Evans MD  acetaminophen (TYLENOL) tablet 650 mg  650 mg Oral Q4H PRN Lencho Mckinney MD   650 mg at 07/26/19 5987    Or    acetaminophen (TYLENOL) solution 650 mg  650 mg Per NG tube Q4H PRN Lencho Mckinney MD        Or   [facility] acetaminophen (TYLENOL) suppository 650 mg  650 mg Rectal Q4H PRN Lencho Mckinney MD        glucose chewable tablet 16 g  4 Tab Oral PRN Lencho Mckinney MD        dextrose (D50W) injection syrg 12.5-25 g  25-50 mL IntraVENous PRN Lencho Mckinney MD        glucagon (GLUCAGEN) injection 1 mg  1 mg IntraMUSCular PRN Lencho Mckinney MD        niMODipine (NIMOTOP) capsule 60 mg  60 mg Oral Q4H Aiyana Arroyo NP   60 mg at 07/26/19 1226        No Known Allergies    Review of Systems:  Pertinent items are noted in the History of Present Illness. Objective:     Visit Vitals  /67   Pulse 66   Temp 97.8 °F (36.6 °C)   Resp 13   Ht 5' 4\" (1.626 m) Comment: From documentation on 7/19/19   Wt 209 lb 14.1 oz (95.2 kg)   SpO2 95%   BMI 36.03 kg/m²    O2 Flow Rate (L/min): 2 l/min O2 Device: Room air         Vitals:    07/26/19 1415 07/26/19 1429 07/26/19 1430 07/26/19 1500   BP:   102/74 130/67   Pulse: (!) 154  75 66   Resp: 15  15 13   Temp:       SpO2: 94%  93% 95%   Weight:  209 lb 14.1 oz (95.2 kg)     Height:            Neurologic Exam:  Mental Status:  Alert and oriented x 4. Appropriate affect, mood and behavior. Language:    Normal fluency, repetition, comprehension and naming. Cranial Nerves:   Pupils equal, round and reactive to light. Visual fields full to confrontation. Extraocular movements intact. Facial sensation intact. Full facial strength, no asymmetry. Hearing grossly intact bilaterally. No dysarthria. Tongue protrudes to midline, palate elevates symmetrically. Shoulder shrug 5/5 bilaterally. Motor:    No pronator drift. Bulk and tone normal.      5/5 power in all extremities proximally and distally.      No involuntary movements. Sensation:    Sensation intact throughout to light touch. Coordination & Gait: No ataxia with FTN and HTS testing. Gait deferred. Skin:  Right arteriotomy groin site checked. Dressing intact, groin soft without hematoma. No drainage noted. Extremities: Dependent edema to bilateral forearms and hands, 1+ pitting edema to bilateral lower extremities below the knees.      24 hour results:    Recent Results (from the past 24 hour(s))   GLUCOSE, POC    Collection Time: 07/25/19  5:17 PM   Result Value Ref Range    Glucose (POC) 145 (H) 65 - 100 mg/dL    Performed by [employer] [facility], POC    Collection Time: 07/25/19 10:51 PM   Result Value Ref Range    Glucose (POC) 84 65 - 100 mg/dL    Performed by [place], POC    Collection Time: 07/26/19  8:00 AM   Result Value Ref Range    Glucose (POC) 117 (H) 65 - 100 mg/dL    Performed by [employer] Number, POC    Collection Time: 07/26/19 10:19 AM   Result Value Ref Range    Glucose (POC) 254 (H) 65 - 100 mg/dL    Performed by Matilde Estrada    TCD INTRACRANIAL ARTERIES COMPLETE    Collection Time: 07/26/19 12:01 PM   Result Value Ref Range    Right Lindegaard Ratio 1.7     Right MCA 1 PSV 95 cm/s    Right MCA 1 EDV 37 cm/s    Right MCA 1 Mean Velocity 56 cm/s    Right NGUYEN  cm/s    Right NGUYEN EDV 99 cm/s    Right NGUYEN Mean Velocity 132 cm/s    Right ICA  cm/s    Right ICA EDV 41 cm/s    Right ICA Mean Velocity 63 cm/s    Right PCA 1  cm/s    Right PCA 1 EDV 40 cm/s    Right PCA 1 Mean Velocity 60 cm/s    Right External ICA PSV 60 cm/s    Right External ICA EDV 20 cm/s    Right External ICA Mean Velocity 33 cm/s    Left MCA 1 PSV 67 cm/s    Left MCA 1 EDV 25 cm/s    Left MCA 1 Mean Velocity 39 cm/s    Left PCA 1  cm/s    Left PCA 1 EDV 43 cm/s    Left PCA 1 Mean Velocity 66 cm/s    Basilar Artery  cm/s    Basilar Artery EDV 48 cm/s    Basilar Artery Mean Mesfin 73 cm/s    Left Vertebral Mean Velocity 55 cm/s    Left Vertebral PSV 88 cm/s    Left Vertebral EDV 38 cm/s    Right NGUYEN Lindegaard Ratio 4    GLUCOSE, POC    Collection Time: 07/26/19 12:19 PM   Result Value Ref Range    Glucose (POC) 245 (H) 65 - 100 mg/dL    Performed by Bambi Acharya           Imaging:  CT of Head on 7/19/2019 shows  IMPRESSION:   Acute subarachnoid hemorrhage in the left sylvian fissure and basal cisterns. No  evidence for acute infarct. CTA of the Head and Neck on 7/19/2019 shows  IMPRESSION:  Severe atherosclerotic change in the cervical internal carotid artery. Greater  than 90% stenosis of the proximal left internal carotid artery.     Moderate stenosis right internal carotid artery approximately 50%.     No intracranial aneurysm. Minimal irregularity of M2 segments may be related to  mild vasospasm. Stable degree of subarachnoid hemorrhage when compared to the CT  of the head performed earlier in the day. CT of Head on 7/20/2019 shows  IMPRESSION:  1. Stable small to moderate volume subarachnoid hemorrhage as described above,  largest components in the suprasellar cistern, prepontine cistern, and left sylvian fissure. CT of Head on 7/22/2019 shows  IMPRESSION  IMPRESSION: Decreased subarachnoid hemorrhage. No new intracranial hemorrhage.       Assessment:     Principal Problem:    SAH (subarachnoid hemorrhage) ([facility]) (7/20/2019)        Plan:   SAH, Savage Mcdonald 1, Perez Grade 2, PBD day 8   - as seen on CT/CTA, no aneurysm seen on angiography x2   - neuro exam non-focal   - Repeat Head CT 7/22, shows a decrease in [facility]   - most likely a perimesencephalic SAH              - Continue Nimotop day 7 of 21 for prevention of delayed cerebral ischemia               - Continue Keppra 500 BID for seizure ppx              - LR at 125 ml/hr to maintain euvolemia   - continue TCD's daily               - Strict I&O's              - TTE shows EF 56-60%, moderate pulmonary HTN, trace mitral valve regurgitation, trace tricuspid                        valve regurgitation               - every 2 hour neuro checks               - SBP goals 110-160               - started aspirin 81 daily and Lovenox for DVT ppx yesterday     Hx of HTN   - SBP goal 110-160    - Cardene PRN, hydralazine/Labetalol PRN   - Hospitalist following    DM type 1   - sliding scale insulin and point of care glucose checks   - Management per hospitalist       Plan d/w Dr. Kay Trevino, ICU RN, patient. Continue to monitor in ICU. Comfort Rowland NP     I have personally seen and examined the patient. I have personally reviewed the chart and images. Elements of my examination included history of present illness, review of systems, review of past medical and surgical history, review of medications, and physical and neurological examination. I have personally reviewed the findings and impressions with my nurse practitioner and am in agreement with the above note. Ronna Trevino M.D.

## 2019-07-26 NOTE — PROGRESS NOTES
Problem: Self Care Deficits Care Plan (Adult)  Goal: *Acute Goals and Plan of Care (Insert Text)  Description  Occupational Therapy Goals  Initiated 7/23/2019  1. Patient will perform grooming standing at sink for 10 minutes with no LOB with supervision/set-up within 7 day(s). 2.  Patient will perform upper body dressing with independence within 7 day(s). 3.  Patient will perform upper body dressing with supervision/set-up within 7 day(s). 4.  Patient will perform toilet transfers with supervision/set-up within 7 day(s). 5.  Patient will perform all aspects of toileting with supervision/set-up within 7 day(s). 6.  Patient will participate in upper extremity therapeutic exercise/activities with independence for 5 minutes within 7 day(s). 7.  Patient will utilize energy conservation techniques during functional activities with verbal cues within 7 day(s). Outcome: Progressing Towards Goal    OCCUPATIONAL THERAPY TREATMENT  Patient: Kennedy Gardner (89 y.o. female)  Date: 7/26/2019  Diagnosis: ICH (intracerebral hemorrhage) ([facility]) [I61.9] SAH (subarachnoid hemorrhage) ([facility] [address]       Precautions:    Chart, occupational therapy assessment, plan of care, and goals were reviewed. ASSESSMENT:  Patient cleared by RN to be seen, received in bed and agreeable to treatment. Patient with CGA/SBA for bed mobility and HHA for functional mobility away from bed. Patient completed toileting with CGA for balance to stand and complete pericare with setup, completed grooming standing at sink with SBA. Patient left sitting in chair with call bell in reach, RN aware and all needs met. Patient continues to function below her baseline 2* impaired balance, generalized weakness and debility. Patient may benefit from short IP rehab stay vs. [facility] pending continued progress with acute rehab. Will continue to follow. VSS throughout session (HR up to 91 bpm, pre activity BP: 152/69, post activity: 124/59, SPO2 94-96% on RA). Recommend with nursing patient to complete as able in order to maintain strength, endurance and independence: ADLs with supervision/setup, OOB to chair 3x/day and mobilizing to the bathroom for toileting with 1 assist. Thank you for your assistance. Progression toward goals:  ?       Improving appropriately and progressing toward goals  ? Improving slowly and progressing toward goals  ? Not making progress toward goals and plan of care will be adjusted     PLAN:  Patient continues to benefit from skilled intervention to address the above impairments. Continue treatment per established plan of care. Discharge Recommendations:  Inpatient Rehab, if not then [facility] with 24/7 supervision  Further Equipment Recommendations for Discharge:  TBD      SUBJECTIVE:   Patient stated I am just so weak.     OBJECTIVE DATA SUMMARY:   Cognitive/Behavioral Status:  Neurologic State: Alert  Orientation Level: Oriented X4  Cognition: Appropriate for age attention/concentration; Follows commands  Perception: Appears intact  Perseveration: No perseveration noted  Safety/Judgement: Awareness of environment; Fall prevention;Decreased insight into deficits    Functional Mobility and Transfers for ADLs:  Bed Mobility:  Supine to Sit: Contact guard assistance  Sit to Supine: Contact guard assistance; Additional time  Scooting: Stand-by assistance    Transfers:  Sit to Stand: Contact guard assistance  Functional Transfers  Toilet Transfer : Contact guard assistance(HHA x1)  Cues: Physical assistance;Verbal cues provided  Bed to Chair: Contact guard assistance    Balance:  Sitting: Intact  Standing: Impaired; With support  Standing - Static: Good  Standing - Dynamic : Fair    ADL Intervention:    Grooming  Washing Face: Stand-by assistance(standing at sink )  Brushing Teeth: Stand-by assistance  Cues: Verbal cues provided    Toileting  Toileting Assistance: Minimum assistance  Bladder Hygiene: Contact guard assistance(in standing )  Clothing Management: Minimum assistance  Cues: Tactile cues provided;Verbal cues provided  Adaptive Equipment: [facility])    Cognitive Retraining  Safety/Judgement: Awareness of environment; Fall prevention;Decreased insight into deficits    Pain:  Pain Scale 1: Numeric (0 - 10)  Pain Intensity 1: 0              Activity Tolerance:   Good, VSS (see above)  Please refer to the flowsheet for vital signs taken during this treatment. After treatment:   ? Patient left in no apparent distress sitting up in chair  ? Patient left in no apparent distress in bed  ? Call bell left within reach  ? Nursing notified  ? Caregiver present  ?  Bed alarm activated    COMMUNICATION/COLLABORATION:   The patients plan of care was discussed with: Physical Therapist and Registered Nurse    Allegra Perkins OT  Time Calculation: 27 mins

## 2019-07-26 NOTE — PROGRESS NOTES
1930:  Bedside and Verbal shift change report given to Db Holt RN (oncoming nurse) by Charly Serra RN (offgoing nurse). Report included the following information SBAR, Kardex, Procedure Summary, Intake/Output, MAR, Accordion, Recent Results, Med Rec Status and Cardiac Rhythm sinsu rhythm. 2000:  Patient in bed A&O x4, BRONSON equally, FC, PERRL, R groin site c/d/i, no hematoma    0000:  Assessment unchanged. Patient resting quietly. Purewick intact. 0400:  Assessment unchanged. Shift summary:  Uneventful shift. Patient A&O x4, BRONSON, FC,  equal, eyes equal.  Tylenol x2 for mild posterior headache. SBP maintained 100-180 without medication. BS .    0730: Bedside and Verbal shift change report given to Charly Serra RN (oncoming nurse) by Db Holt RN (offgoing nurse). Report included the following information SBAR, Kardex, OR Summary, Procedure Summary, Intake/Output, MAR, Accordion, Recent Results, Med Rec Status and Cardiac Rhythm sinus rhtyhm.

## 2019-07-26 NOTE — PROGRESS NOTES
Hospitalist Progress Note    NAME: Dana Lucas   :  1950   MRN:  [id number]       Assessment / Plan:  1.  Acute subarachnoid hemorrhage,  stable, plan for cerebral angiography on   -S/P cerebral angiogram, no obvious aneurysm   - Optimize BP control, currently on cardene gtt, keep SBP less than 140   - Continue Nimodipine  - continue keppra   - PT eval pending  - CT head with \" Stable small to moderate volume subarachnoid hemorrhage \"  - neurochecks, seizure precaution  - appreciate NS and BERRY input  CT shows stable SAH    angio on 19 shows no aneurysm, continue close monitoring, keep BP below 180 mmHg  2.  Acute kidney injury,stable   - likely prerenal due to recent GI loss  - improving  - continue IV hydration  - avoid nephrotoxic meds     3.  Diabetes mellitus type 2,with hyperglycemia ; still hyperglycemic    - better controlled  - continue SSNI, accuchecks, monitor   Resume lower dose of Lantus, she is eating better, required SSI,  Increased Lantus 18 BID         4.  Accelerated hypertension. BP stable   -tight BP control to keep SBP less than 140  -  Cardene gtt off   - monitor   - 5. Afib: BB no anticoagulation,   Code status: full  DVT prophylaxis: SCD     Care Plan discussed with: Patient/Family  Disposition: Home w/Family  Subjective:     Chief Complaint / Reason for Physician Visit  Feeling ok \". Discussed with RN events overnight. Review of Systems:  Symptom Y/N Comments  Symptom Y/N Comments   Fever/Chills    Chest Pain     Poor Appetite    Edema     Cough    Abdominal Pain     Sputum    Joint Pain     SOB/JAMES    Pruritis/Rash     Nausea/vomit    Tolerating PT/OT     Diarrhea    Tolerating Diet     Constipation    Other       Could NOT obtain due to:      Objective:     VITALS:   Last 24hrs VS reviewed since prior progress note.  Most recent are:  Patient Vitals for the past 24 hrs:   Temp Pulse Resp BP SpO2   19 1226  64  (!) 175/91    19 1200 97.8 °F (36.6 °C) (!) 125 20 (!) 175/91 96 %   07/26/19 1100  (!) 113 20 150/90 97 %   07/26/19 1010  (!) 110 11  96 %   07/26/19 1000  (!) 58 17 108/59 93 %   07/26/19 0900  72 15 157/66 97 %   07/26/19 0837  74  177/84    07/26/19 0800 98.5 °F (36.9 °C) 79 21 (!) 178/92 96 %   07/26/19 0700  (!) 128 16 104/75 (!) 89 %   07/26/19 0600  64 11 127/60 92 %   07/26/19 0500  67 13 157/77 95 %   07/26/19 0400 98 °F (36.7 °C) 67 14 137/78 94 %   07/26/19 0300  67 13 145/77 94 %   07/26/19 0200  92 12 118/70 92 %   07/26/19 0100  90 13 101/58 90 %   07/26/19 0000 97.7 °F (36.5 °C) (!) 106 14  93 %   07/25/19 2300  76 13 167/81 95 %   07/25/19 2200  62 14 138/72 95 %   07/25/19 2100  66 12 121/87 94 %   07/25/19 2000 97.9 °F (36.6 °C) 67 11 129/66 94 %   07/25/19 1900  68 13  93 %   07/25/19 1800  (!) 124 13 (!) 149/96 92 %   07/25/19 1730  70 15 168/83 95 %   07/25/19 1700  65 14 160/87 96 %   07/25/19 1645 97.6 °F (36.4 °C) 72 12 161/88 96 %   07/25/19 1631  63 16 160/79 99 %   07/25/19 1630  72 16 144/78 98 %   07/25/19 1615  65 14 137/81 98 %   07/25/19 1610  74 12 162/84 93 %   07/25/19 1600  73 16 165/86 95 %   07/25/19 1443  90 18 (!) 169/99 98 %   07/25/19 1440  72 20 (!) 162/99 98 %   07/25/19 1400  98 15 120/58 91 %   07/25/19 1323  74 16 164/75 97 %       Intake/Output Summary (Last 24 hours) at 7/26/2019 1302  Last data filed at 7/26/2019 1226  Gross per 24 hour   Intake 2662.5 ml   Output 4700 ml   Net -2037.5 ml        PHYSICAL EXAM:  General: WD, WN. Alert, cooperative, no acute distress    EENT:  EOMI. Anicteric sclerae. MMM  Resp:  CTA bilaterally, no wheezing or rales. No accessory muscle use  CV:  Regular  rhythm,  No edema  GI:  Soft, Non distended, Non tender.  +Bowel sounds  Neurologic:  Alert and oriented X 3, normal speech,   Psych:   Good insight. Not anxious nor agitated  Skin:  No rashes.   No jaundice    Reviewed most current lab test results and cultures  YES  Reviewed most current radiology test results   YES  Review and summation of old records today    NO  Reviewed patient's current orders and MAR    YES  PMH/SH reviewed - no change compared to H&P  ________________________________________________________________________  Care Plan discussed with:    Comments   Patient     Family      RN     Care Manager     Consultant                        Multidiciplinary team rounds were held today with , nursing, pharmacist and clinical coordinator. Patient's plan of care was discussed; medications were reviewed and discharge planning was addressed. ________________________________________________________________________  Total NON critical care TIME: 35 Minutes    Total CRITICAL CARE TIME Spent:   Minutes non procedure based      Comments   >50% of visit spent in counseling and coordination of care     ________________________________________________________________________  Ron Mojica MD     Procedures: see electronic medical records for all procedures/Xrays and details which were not copied into this note but were reviewed prior to creation of Plan. LABS:  I reviewed today's most current labs and imaging studies.   Pertinent labs include:  Recent Labs     07/24/19 0912   WBC 8.7   HGB 11.5   HCT 36.3        Recent Labs     07/24/19 0912      K 3.8      CO2 26   *   BUN 23*   CREA 1.24*   CA 9.0       Signed: Ron Mojica MD

## 2019-07-26 NOTE — PROGRESS NOTES
Problem: Mobility Impaired (Adult and Pediatric)  Goal: *Acute Goals and Plan of Care (Insert Text)  Description  Physical Therapy Goals  Initiated 7/21/2019  1. Patient will move from supine to sit and sit to supine  and scoot up and down in bed with independence within 7 day(s). 2.  Patient will transfer from bed to chair and chair to bed with independence using the least restrictive device within 7 day(s). 3.  Patient will perform sit to stand with independence within 7 day(s). 4.  Patient will ambulate with independence for 300 feet with the least restrictive device within 7 day(s). 5.  Patient will ascend/descend 14 stairs with handrail(s) with supervision/set-up within 7 day(s). 6.  Patient will improve Johnson Balance score by 7 points within 7 days. Outcome: Progressing Towards Goal     PHYSICAL THERAPY TREATMENT  Patient: Kathy Pollack (39 y.o. female)  Date: 7/26/2019  Diagnosis: ICH (intracerebral hemorrhage) ([facility]ly [facility]) [I61.9] SAH (subarachnoid hemorrhage) ([facility])       Precautions:    Chart, physical therapy assessment, plan of care and goals were reviewed. ASSESSMENT:  Patient received up in chair, willing to participate despite fatigue. Overall CGA for transfers from chair and BSC. Ambulated 30 feet with HHA, noted wide SHAHIDA, shuffled steps, and slightly increased trunk sway. Also noted patient ambulating with high guard posture. Requires HHA for balance currently, especially during turns. Patient returned to supine at end of session for doppler study, required CGA for sit to supine. VSS with all position changes. Recommend HHPT vs IP rehab pending progress as patient has 14 stairs to navigate in the home. Progression toward goals:  ?    Improving appropriately and progressing toward goals  ? Improving slowly and progressing toward goals  ?     Not making progress toward goals and plan of care will be adjusted     PLAN:  Patient continues to benefit from skilled intervention to address the above impairments. Continue treatment per established plan of care. Discharge Recommendations:  Home Health and Inpatient Rehab  Further Equipment Recommendations for Discharge:  TBD      SUBJECTIVE:   Patient stated I'm really tired.     OBJECTIVE DATA SUMMARY:   Critical Behavior:  Neurologic State: Alert, Eyes open spontaneously  Orientation Level: Oriented X4  Cognition: Follows commands  Safety/Judgement: Awareness of environment, Fall prevention  Functional Mobility Training:  Bed Mobility:        Sit to Supine: Contact guard assistance; Additional time           Transfers:  Sit to Stand: Contact guard assistance  Stand to Sit: Contact guard assistance  Stand Pivot Transfers: Contact guard assistance                          Balance:  Sitting: Intact  Standing: Impaired  Standing - Static: Good  Standing - Dynamic : Fair  Ambulation/Gait Training:  Distance (ft): 30 Feet (ft)  Assistive Device: Gait belt(HHA)  Ambulation - Level of Assistance: Minimal assistance        Gait Abnormalities: Decreased step clearance;Trunk sway increased        Base of Support: Widened     Speed/Maira: Shuffled  Step Length: Right shortened;Left shortened                    Stairs:              Neuro Re-Education:    Therapeutic Exercises:     Pain:  Pain Scale 1: Numeric (0 - 10)  Pain Intensity 1: 0              Activity Tolerance:   VSS  Please refer to the flowsheet for vital signs taken during this treatment. After treatment:   ?    Patient left in no apparent distress sitting up in chair  ? Patient left in no apparent distress in bed  ? Call bell left within reach  ? Nursing notified  ? Caregiver present  ?     Bed alarm activated    COMMUNICATION/COLLABORATION:   The patients plan of care was discussed with: Registered Nurse    Steve Lundy, PT   Time Calculation: 17 mins

## 2019-07-27 ENCOUNTER — APPOINTMENT (OUTPATIENT)
Dept: VASCULAR SURGERY | Age: 69
DRG: 065 | End: 2019-07-27
Attending: NURSE PRACTITIONER
Payer: COMMERCIAL

## 2019-07-27 LAB
ANION GAP SERPL CALC-SCNC: 7 MMOL/L (ref 5–15)
BUN SERPL-MCNC: 23 MG/DL (ref 6–20)
BUN/CREAT SERPL: 19 (ref 12–20)
CALCIUM SERPL-MCNC: 9.3 MG/DL (ref 8.5–10.1)
CHLORIDE SERPL-SCNC: 103 MMOL/L (ref 97–108)
CO2 SERPL-SCNC: 29 MMOL/L (ref 21–32)
CREAT SERPL-MCNC: 1.19 MG/DL (ref 0.55–1.02)
GLUCOSE BLD STRIP.AUTO-MCNC: 102 MG/DL (ref 65–100)
GLUCOSE BLD STRIP.AUTO-MCNC: 105 MG/DL (ref 65–100)
GLUCOSE BLD STRIP.AUTO-MCNC: 146 MG/DL (ref 65–100)
GLUCOSE BLD STRIP.AUTO-MCNC: 239 MG/DL (ref 65–100)
GLUCOSE SERPL-MCNC: 248 MG/DL (ref 65–100)
POTASSIUM SERPL-SCNC: 4 MMOL/L (ref 3.5–5.1)
SERVICE CMNT-IMP: ABNORMAL
SODIUM SERPL-SCNC: 139 MMOL/L (ref 136–145)

## 2019-07-27 PROCEDURE — 74011636637 HC RX REV CODE- 636/637: Performed by: NURSE PRACTITIONER

## 2019-07-27 PROCEDURE — 74011250636 HC RX REV CODE- 250/636: Performed by: NURSE PRACTITIONER

## 2019-07-27 PROCEDURE — 93886 INTRACRANIAL COMPLETE STUDY: CPT

## 2019-07-27 PROCEDURE — 74011636637 HC RX REV CODE- 636/637: Performed by: INTERNAL MEDICINE

## 2019-07-27 PROCEDURE — 74011250637 HC RX REV CODE- 250/637: Performed by: NURSE PRACTITIONER

## 2019-07-27 PROCEDURE — 74011250637 HC RX REV CODE- 250/637: Performed by: FAMILY MEDICINE

## 2019-07-27 PROCEDURE — 80048 BASIC METABOLIC PNL TOTAL CA: CPT

## 2019-07-27 PROCEDURE — 36415 COLL VENOUS BLD VENIPUNCTURE: CPT

## 2019-07-27 PROCEDURE — 65610000006 HC RM INTENSIVE CARE

## 2019-07-27 PROCEDURE — 82962 GLUCOSE BLOOD TEST: CPT

## 2019-07-27 PROCEDURE — 74011250637 HC RX REV CODE- 250/637: Performed by: INTERNAL MEDICINE

## 2019-07-27 RX ADMIN — INSULIN LISPRO 7 UNITS: 100 INJECTION, SOLUTION INTRAVENOUS; SUBCUTANEOUS at 12:44

## 2019-07-27 RX ADMIN — METOPROLOL TARTRATE 25 MG: 25 TABLET ORAL at 09:09

## 2019-07-27 RX ADMIN — SODIUM CHLORIDE, SODIUM LACTATE, POTASSIUM CHLORIDE, AND CALCIUM CHLORIDE 125 ML/HR: 600; 310; 30; 20 INJECTION, SOLUTION INTRAVENOUS at 17:02

## 2019-07-27 RX ADMIN — PANTOPRAZOLE SODIUM 40 MG: 40 TABLET, DELAYED RELEASE ORAL at 07:35

## 2019-07-27 RX ADMIN — DOCUSATE SODIUM 100 MG: 100 CAPSULE, LIQUID FILLED ORAL at 09:09

## 2019-07-27 RX ADMIN — NIMODIPINE 60 MG: 30 CAPSULE, LIQUID FILLED ORAL at 01:58

## 2019-07-27 RX ADMIN — METOPROLOL TARTRATE 25 MG: 25 TABLET ORAL at 18:37

## 2019-07-27 RX ADMIN — LEVETIRACETAM 500 MG: 500 TABLET, FILM COATED ORAL at 21:38

## 2019-07-27 RX ADMIN — LEVETIRACETAM 500 MG: 500 TABLET, FILM COATED ORAL at 09:09

## 2019-07-27 RX ADMIN — HYDROCHLOROTHIAZIDE 25 MG: 25 TABLET ORAL at 09:09

## 2019-07-27 RX ADMIN — INSULIN GLARGINE 18 UNITS: 100 INJECTION, SOLUTION SUBCUTANEOUS at 21:38

## 2019-07-27 RX ADMIN — INSULIN LISPRO 3 UNITS: 100 INJECTION, SOLUTION INTRAVENOUS; SUBCUTANEOUS at 12:44

## 2019-07-27 RX ADMIN — POLYETHYLENE GLYCOL 3350 17 G: 17 POWDER, FOR SOLUTION ORAL at 09:17

## 2019-07-27 RX ADMIN — NIMODIPINE 60 MG: 30 CAPSULE, LIQUID FILLED ORAL at 05:19

## 2019-07-27 RX ADMIN — ASPIRIN 81 MG 81 MG: 81 TABLET ORAL at 09:09

## 2019-07-27 RX ADMIN — NIMODIPINE 60 MG: 30 CAPSULE, LIQUID FILLED ORAL at 12:44

## 2019-07-27 RX ADMIN — INSULIN LISPRO 7 UNITS: 100 INJECTION, SOLUTION INTRAVENOUS; SUBCUTANEOUS at 07:35

## 2019-07-27 RX ADMIN — INSULIN LISPRO 2 UNITS: 100 INJECTION, SOLUTION INTRAVENOUS; SUBCUTANEOUS at 16:57

## 2019-07-27 RX ADMIN — NIMODIPINE 60 MG: 30 CAPSULE, LIQUID FILLED ORAL at 16:51

## 2019-07-27 RX ADMIN — SODIUM CHLORIDE, SODIUM LACTATE, POTASSIUM CHLORIDE, AND CALCIUM CHLORIDE 500 ML: 600; 310; 30; 20 INJECTION, SOLUTION INTRAVENOUS at 11:05

## 2019-07-27 RX ADMIN — NIMODIPINE 60 MG: 30 CAPSULE, LIQUID FILLED ORAL at 07:34

## 2019-07-27 RX ADMIN — LOSARTAN POTASSIUM 100 MG: 50 TABLET ORAL at 09:09

## 2019-07-27 RX ADMIN — NIMODIPINE 60 MG: 30 CAPSULE, LIQUID FILLED ORAL at 19:26

## 2019-07-27 RX ADMIN — NIMODIPINE 60 MG: 30 CAPSULE, LIQUID FILLED ORAL at 23:26

## 2019-07-27 RX ADMIN — SODIUM CHLORIDE, SODIUM LACTATE, POTASSIUM CHLORIDE, AND CALCIUM CHLORIDE 125 ML/HR: 600; 310; 30; 20 INJECTION, SOLUTION INTRAVENOUS at 02:02

## 2019-07-27 RX ADMIN — ENOXAPARIN SODIUM 40 MG: 40 INJECTION SUBCUTANEOUS at 16:57

## 2019-07-27 RX ADMIN — INSULIN GLARGINE 18 UNITS: 100 INJECTION, SOLUTION SUBCUTANEOUS at 09:09

## 2019-07-27 RX ADMIN — INSULIN LISPRO 7 UNITS: 100 INJECTION, SOLUTION INTRAVENOUS; SUBCUTANEOUS at 16:57

## 2019-07-27 NOTE — PROGRESS NOTES
`  Hospitalist Progress Note    NAME: Raven Manning   :  1950   MRN:  [id number]       Assessment / Plan:  1.  Acute subarachnoid hemorrhage,  stable, plan for cerebral angiography on   -S/P cerebral angiogram, no obvious aneurysm   - Optimize BP control, currently on cardene gtt, keep SBP less than 140   - Continue Nimodipine  - continue keppra   - PT eval pending  - CT head with \" Stable small to moderate volume subarachnoid hemorrhage \"  - neurochecks, seizure precaution  - appreciate NS and BERRY input  CT shows stable SAH    angio on 19 shows no aneurysm, continue close monitoring, keep BP below 180 mmHg  TCD: shows Right Transcranial Doppler   The MCA and PCA are patent and without evidence of Vasospasm. The terminal ICA was not clearly visualized. NGUYEN velocity appears significantly elevated and suggests vasospasm. The vertebral was not seen. Left Transcranial Doppler     The MCA appears patent and without evidence of vasospasm. The terminal ICA and NGUYEN are visualized. Noted left distal extracranial ICA occlusion as previously reported on angiography. The basilar and vertebral appear patent and without vasospasm on today's exam. The PCA remains significantly elevated and suggests possible vasospasm vs hyperemia.  `  2.  Acute kidney injury,stable   - likely prerenal due to recent GI loss  - improving  - continue IV hydration  - avoid nephrotoxic meds     3.  Diabetes mellitus type 2,with hyperglycemia ; still hyperglycemic    - better controlled  - continue SSNI, accuchecks, monitor   Resume lower dose of Lantus, she is eating better, required SSI,  Increased Lantus 18 BID         4.  Accelerated hypertension. BP stable   -tight BP control to keep SBP less than 140  -  Cardene gtt off   - monitor   - 5.  Afib: BB no anticoagulation,   Code status: full  DVT prophylaxis: SCD     Care Plan discussed with: Patient/Family  Disposition: Home w/Family  Subjective:     Chief Complaint / Reason for Physician Visit  \"feeling better \". Discussed with RN events overnight. Review of Systems:  Symptom Y/N Comments  Symptom Y/N Comments   Fever/Chills    Chest Pain     Poor Appetite    Edema     Cough    Abdominal Pain     Sputum    Joint Pain     SOB/JAMES    Pruritis/Rash     Nausea/vomit    Tolerating PT/OT     Diarrhea    Tolerating Diet     Constipation    Other       Could NOT obtain due to:      Objective:     VITALS:   Last 24hrs VS reviewed since prior progress note.  Most recent are:  Patient Vitals for the past 24 hrs:   Temp Pulse Resp BP SpO2   07/27/19 1200  74 14 168/76 96 %   07/27/19 1100  68 17 160/72 97 %   07/27/19 1052  65 14 160/86 96 %   07/27/19 0901  87 (!) 31 119/87 96 %   07/27/19 0800 97.9 °F (36.6 °C) 76 17  95 %   07/27/19 0700  72 15  96 %   07/27/19 0600  72 12 141/82 93 %   07/27/19 0500  73 16 159/82 95 %   07/27/19 0410  (!) 104 15  96 %   07/27/19 0400 98.3 °F (36.8 °C) 97 14 163/87 95 %   07/27/19 0300  (!) 103 17 (!) 146/97 92 %   07/27/19 0200  70 23 153/79 99 %   07/27/19 0100  (!) 109 14 148/73 95 %   07/27/19 0000 98 °F (36.7 °C) 64 16 146/81 95 %   07/26/19 2300  68 23 131/76 98 %   07/26/19 2217  (!) 119 13  93 %   07/26/19 2200  64 17 131/64 93 %   07/26/19 2130  78 22 138/75 98 %   07/26/19 2100  69 17 141/67 97 %   07/26/19 2030  68 15 126/75 95 %   07/26/19 2000 97.8 °F (36.6 °C) 66 17 103/77 96 %   07/26/19 1905  66 14  95 %   07/26/19 1900  (!) 132 19 125/66 95 %   07/26/19 1800  (!) 133 15 121/86 95 %   07/26/19 1700  (!) 105 14 (!) 154/98 95 %   07/26/19 1600 98 °F (36.7 °C) 96 19 149/87 94 %   07/26/19 1530  100 15  95 %   07/26/19 1500  66 13 130/67 95 %   07/26/19 1430  75 15 102/74 93 %   07/26/19 1415  (!) 154 15  94 %   07/26/19 1400  69 12  95 %   07/26/19 1300  68 15  95 %       Intake/Output Summary (Last 24 hours) at 7/27/2019 1256  Last data filed at 7/27/2019 0700  Gross per 24 hour   Intake 2450 ml   Output 2150 ml Net 300 ml        PHYSICAL EXAM:  General: WD, WN. Alert, cooperative, no acute distress    EENT:  EOMI. Anicteric sclerae. MMM  Resp:  CTA bilaterally, no wheezing or rales. No accessory muscle use  CV:  Regular  rhythm,  No edema  GI:  Soft, Non distended, Non tender.  +Bowel sounds  Neurologic:  Alert and oriented X 3, normal speech,   Psych:   Good insight. Not anxious nor agitated  Skin:  No rashes. No jaundice    Reviewed most current lab test results and cultures  YES  Reviewed most current radiology test results   YES  Review and summation of old records today    NO  Reviewed patient's current orders and MAR    YES  PMH/SH reviewed - no change compared to H&P  ________________________________________________________________________  Care Plan discussed with:    Comments   Patient     Family      RN     Care Manager     Consultant                        Multidiciplinary team rounds were held today with , nursing, pharmacist and clinical coordinator. Patient's plan of care was discussed; medications were reviewed and discharge planning was addressed. ________________________________________________________________________  Total NON critical care TIME: 35 Minutes    Total CRITICAL CARE TIME Spent:   Minutes non procedure based      Comments   >50% of visit spent in counseling and coordination of care     ________________________________________________________________________  Erica Kruse MD     Procedures: see electronic medical records for all procedures/Xrays and details which were not copied into this note but were reviewed prior to creation of Plan. LABS:  I reviewed today's most current labs and imaging studies. Pertinent labs include:  No results for input(s): WBC, HGB, HCT, PLT, HGBEXT, HCTEXT, PLTEXT, HGBEXT, HCTEXT, PLTEXT in the last 72 hours.   Recent Labs     07/27/19  1053      K 4.0      CO2 29   *   BUN 23*   CREA 1.19*   CA 9.3       Signed: Viral Bambi Christiansen, MD

## 2019-07-27 NOTE — PROGRESS NOTES
8328 Shift Summary--Pt is neuro intact-- weakly ambulates to chair with assistance-- voiding in large amounts--Denies pain or nausea-- Q2 hr NC

## 2019-07-27 NOTE — PROGRESS NOTES
Neurointerventional Surgery Progress Note  Neema Guardado, [facility]  Neurocritical Care NP  [phone]    Admit Date: 2019   LOS: 8 days        Daily Progress Note: 2019    POD: 7 diagnostic cerebral angiogram by Dr. Rogelio Julien, POD 2 diagnostic angiogram by Dr. Beto Saldivar     HPI: Jessee Fulton is a 80 y.o. female with a PMH significant for HTN, Type 2 DM, hx of acute renal failure, and atrial fibrillation who [place] [facility] [place] [place]  due to complaints of headache and weakness. Patient reported on 19 she started vomiting and then subsequently developed a headache the next day. She tried taking tylenol for her headache with some relief. She reported a hx of a minor headache, but this headache was worse than usual for her. The patient also reported weakness x 2 days causing a few falls at home. She denied any LOC. She reported her blood glucose has been elevated over the past 5 days, more so than usual. In the ED, a stat head CT was performed which showed SAH in the left sylvian fissure and basal cisterns. 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 She was transferred to [facility] for higher level of care. She is not a smoker and reports her uncle  from a cerebral aneurysm. Subjective:   She is alert and sitting up in the chair eating breakfast. She complains of a mild intermittent frontal headache, rated 1/10, being managed well with Tylenol. She denies any vision changes, photophobia, neck stiffness, nausea, vomiting, balance issues, chest pain, dyspnea, weakness, numbness, or tingling. She reports not having a bowel movement since being admitted here. She is taking colace daily and denies any abdominal pain or symptoms of constipation. She is passing flatus.      Current Facility-Administered Medications   Medication Dose Route Frequency Provider Last Rate Last Dose    insulin glargine (LANTUS) injection 18 Units  18 Units SubCUTAneous Q12H Glenn Osborne MD   18 Units at 07/27/19 8441    losartan (COZAAR) tablet 100 mg  100 mg Oral DAILY Isela Alonso, NP   100 mg at 07/27/19 9467    And    hydroCHLOROthiazide (HYDRODIURIL) tablet 25 mg  25 mg Oral DAILY Isela Alonso, NP   25 mg at 07/27/19 5748    insulin lispro (HUMALOG) injection 7 Units  7 Units SubCUTAneous TIDAC Isela Alonso NP   7 Units at 07/27/19 0735    enoxaparin (LOVENOX) injection 40 mg  40 mg SubCUTAneous Q24H Glenn ARGUELLO NP   40 mg at 07/26/19 1700    aspirin chewable tablet 81 mg  81 mg Oral DAILY Isela Alonso, NP   81 mg at 07/27/19 6798    labetalol (NORMODYNE;TRANDATE) 20 mg/4 mL (5 mg/mL) injection 20 mg  20 mg IntraVENous Q1H PRN Gregorio Arroyo NP   20 mg at 07/25/19 8148    hydrALAZINE (APRESOLINE) 20 mg/mL injection 10 mg  10 mg IntraVENous Q1H PRN Javier Alatorre NP   10 mg at 07/23/19 1251    metoprolol tartrate (LOPRESSOR) tablet 25 mg  25 mg Oral BID Osei Millan MD   25 mg at 07/27/19 0909    metoprolol (LOPRESSOR) injection 5 mg  5 mg IntraVENous Q15MIN PRN Javier Alatorre NP   5 mg at 07/26/19 1841    lactated Ringers infusion  125 mL/hr IntraVENous CONTINUOUS Betsy Goncalves  mL/hr at 07/27/19 0202 125 mL/hr at 07/27/19 0202    insulin lispro (HUMALOG) injection   SubCUTAneous AC&HS Glenn Osborne MD   Stopped at 07/26/19 2200    pantoprazole (PROTONIX) tablet 40 mg  40 mg Oral ACB Glenn Osborne MD   40 mg at 07/27/19 0735    levETIRAcetam (KEPPRA) tablet 500 mg  500 mg Oral Q12H Marilee Pillai MD   500 mg at 07/27/19 0909    polyethylene glycol (MIRALAX) packet 17 g  17 g Oral DAILY PRN Antione Buck NP   17 g at 07/27/19 0917    docusate sodium (COLACE) capsule 100 mg  100 mg Oral DAILY Betsy Goncalves NP   100 mg at 07/27/19 0909    niCARdipine (CARDENE) 25 mg in 0.9% sodium chloride 250 mL infusion  0-15 mg/hr IntraVENous TITRATE Jose Castro MD 25 mL/hr at 19 0725 2.5 mg/hr at 19 0725    ondansetron (ZOFRAN) injection 4 mg  4 mg IntraVENous Q6H PRN Rodney Phoenix MD   4 mg at 19 1301    naloxone (NARCAN) injection 0.4 mg  0.4 mg IntraVENous PRN Rodney Phoenix MD        acetaminophen (TYLENOL) tablet 650 mg  650 mg Oral Q4H PRN Rodney Phoenix MD   650 mg at 19 1831    Or    acetaminophen (TYLENOL) solution 650 mg  650 mg Per NG tube Q4H PRN Rodney Phoenix MD        Or   Valerie McGrath acetaminophen (TYLENOL) suppository 650 mg  650 mg Rectal Q4H PRN Rodney Phoenix MD        glucose chewable tablet 16 g  4 Tab Oral PRN Rodney Phoenix MD        dextrose (D50W) injection syrg 12.5-25 g  25-50 mL IntraVENous PRN Rodney Phoenix MD        glucagon (GLUCAGEN) injection 1 mg  1 mg IntraMUSCular PRN Rodney Phoenix MD        niMODipine (NIMOTOP) capsule 60 mg  60 mg Oral Q4H Lack, Johnella Ormond, NP   60 mg at 19 0734        No Known Allergies    Review of Systems:  Pertinent items are noted in the History of Present Illness. Objective:     Vital signs  Temp (24hrs), Av °F (36.7 °C), Min:97.8 °F (36.6 °C), Max:98.3 °F (36.8 °C)   No intake/output data recorded.  1901 -  0700  In: 4800 [P.O.:175;  I.V.:4625]  Out: 5750 [Urine:5750]    Visit Vitals  /87   Pulse 87   Temp 97.9 °F (36.6 °C)   Resp (!) 31   Ht 5' 4\" (1.626 m) Comment: From documentation on 19   Wt 207 lb 10.8 oz (94.2 kg)   SpO2 96%   BMI 35.65 kg/m²    O2 Flow Rate (L/min): 2 l/min O2 Device: Room air     Pain control  Pain Assessment  Pain Scale 1: Numeric (0 - 10)  Pain Intensity 1: 0  Pain Onset 1: just now/from sleeping  Pain Location 1: Head  Pain Orientation 1: Posterior  Pain Description 1: Sore  Pain Intervention(s) 1: Medication (see MAR)    PT/OT  Gait     Gait  Base of Support: Widened  Speed/Maira: Shuffled  Step Length: Right shortened, Left shortened  Gait Abnormalities: Decreased step clearance, Trunk sway increased  Ambulation - Level of Assistance: Minimal assistance  Distance (ft): 30 Feet (ft)  Assistive Device: Gait belt(HHA)        Vitals:    07/27/19 0600 07/27/19 0700 07/27/19 0800 07/27/19 0901   BP: 141/82   119/87   Pulse: 72 72 76 87   Resp: 12 15 17 (!) 31   Temp:   97.9 °F (36.6 °C)    SpO2: 93% 96% 95% 96%   Weight:       Height:            Physical Exam:  GENERAL: alert, cooperative, no distress, appears stated age  LUNG: clear to auscultation bilaterally  HEART: regular rate and rhythm, S1, S2 normal, no murmur, click, rub or gallop  EXTREMITIES:  extremities normal, atraumatic, no cyanosis, trace edema in bilateral hands. 1+ distal pulses bilaterally. SKIN: no rash or abnormalities. Skin warm to touch. Right groin site clean, dry, and intact. No hematoma, bruising, or bleeding at site. Neurologic Exam:  Mental Status:  Alert and oriented x 4. Appropriate affect, mood and behavior. Language:    Normal fluency. Able to name objects. Cranial Nerves:        Pupils equal, round and reactive to light. Visual fields full to confrontation. Extraocular movements intact. Facial sensation intact. Full facial strength, no asymmetry. Hearing grossly intact bilaterally. No dysarthria. Tongue protrudes to midline, palate elevates symmetrically. Motor:    No pronator drift. Bulk and tone normal.      5/5 power in all extremities proximally and distally. No involuntary movements. Sensation:    Sensation intact throughout to light touch. Coordination & Gait: No ataxia with FTN and HTS testing. Gait deferred.      24 hour results:    Recent Results (from the past 24 hour(s))   GLUCOSE, POC    Collection Time: 07/26/19 10:19 AM   Result Value Ref Range    Glucose (POC) 254 (H) 65 - 100 mg/dL    Performed by Alice Calderón    TCD INTRACRANIAL ARTERIES COMPLETE    Collection Time: 07/26/19 12:01 PM   Result Value Ref Range    Right Lindegaard Ratio 1.7     Right MCA 1 PSV 95 cm/s Right MCA 1 EDV 37 cm/s    Right MCA 1 Mean Velocity 56 cm/s    Right NGUYEN  cm/s    Right NGUYEN EDV 99 cm/s    Right NGUYEN Mean Velocity 132 cm/s    Right ICA  cm/s    Right ICA EDV 41 cm/s    Right ICA Mean Velocity 63 cm/s    Right PCA 1  cm/s    Right PCA 1 EDV 40 cm/s    Right PCA 1 Mean Velocity 60 cm/s    Right External ICA PSV 60 cm/s    Right External ICA EDV 20 cm/s    Right External ICA Mean Velocity 33 cm/s    Left MCA 1 PSV 67 cm/s    Left MCA 1 EDV 25 cm/s    Left MCA 1 Mean Velocity 39 cm/s    Left PCA 1  cm/s    Left PCA 1 EDV 43 cm/s    Left PCA 1 Mean Velocity 66 cm/s    Basilar Artery  cm/s    Basilar Artery EDV 48 cm/s    Basilar Artery Mean Mesfin 73 cm/s    Left Vertebral Mean Velocity 55 cm/s    Left Vertebral PSV 88 cm/s    Left Vertebral EDV 38 cm/s    Right NGUYEN Lindegaard Ratio 4    GLUCOSE, POC    Collection Time: 07/26/19 12:19 PM   Result Value Ref Range    Glucose (POC) 245 (H) 65 - 100 mg/dL    Performed by Sarah Urrutia, POC    Collection Time: 07/26/19  4:25 PM   Result Value Ref Range    Glucose (POC) 184 (H) 65 - 100 mg/dL    Performed by Daria Ordaz    GLUCOSE, POC    Collection Time: 07/26/19  6:16 PM   Result Value Ref Range    Glucose (POC) 176 (H) 65 - 100 mg/dL    Performed by Daria Ordaz    GLUCOSE, POC    Collection Time: 07/26/19  9:36 PM   Result Value Ref Range    Glucose (POC) 147 (H) 65 - 100 mg/dL    Performed by JAMES ROLLE    GLUCOSE, POC    Collection Time: 07/27/19  7:23 AM   Result Value Ref Range    Glucose (POC) 105 (H) 65 - 100 mg/dL    Performed by [facility]           Imaging:  CT of Head on 7/19/2019 shows  IMPRESSION:   Acute subarachnoid hemorrhage in the left sylvian fissure and basal cisterns. No  evidence for acute infarct. CTA of the Head and Neck on 7/19/2019 shows  IMPRESSION:  Severe atherosclerotic change in the cervical internal carotid artery.  Greater  than 90% stenosis of the proximal left internal carotid artery.     Moderate stenosis right internal carotid artery approximately 50%.     No intracranial aneurysm. Minimal irregularity of M2 segments may be related to  mild vasospasm. Stable degree of subarachnoid hemorrhage when compared to the CT  of the head performed earlier in the day. CT of Head on 7/20/2019 shows  IMPRESSION:  1. Stable small to moderate volume subarachnoid hemorrhage as described above,  largest components in the suprasellar cistern, prepontine cistern, and left sylvian fissure. CT of Head on 7/22/2019 shows  IMPRESSION  IMPRESSION: Decreased subarachnoid hemorrhage. No new intracranial hemorrhage. Assessment:     Principal Problem:    SAH (subarachnoid hemorrhage) ([address] (7/20/2019)        Plan:   1. SAH, Savage Mcdonald 1, Perez Grade 2, PBD day 12   - as seen on CT/CTA, no aneurysm seen on CTA   - neuro exam non-focal   - s/p cerebral angiogram on 7/20 which did not show an obvious aneurysm to explain blood pattern, 1 mm x 1 mm triangular outpouching on distal right NGUYEN trunk; ? Infundibulum, tiny aneurysm seen on angiogram, patient will need a repeat angiogram in one week. Repeat angio on 7/25 showed no evidence of aneurysm, AVM, or fistula. Minimal right NGUYEN A1 spasm. Occluded LICA in neck with reconstitution by vaso vasorum and ophthalmic. Left hemisphere fed by large caliber left PCOM. Intracranial athero. 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              - Continue Nimotop day 9 of 21 for prevention of delayed cerebral ischemia               - Continue Keppra 500 BID for seizure ppx              - LR at 125 ml/hr to maintain euvolemia, 500 ml of LR bolus ordered to maintain euvolemia.     - continue TCD's today daily               - Strict I&O's              - TTE shows EF 56-60%, moderate pulmonary HTN, trace mitral valve regurgitation, trace tricuspid valve regurgitation               - every 2 hour neuro checks during the day and every 4 hours at night               - SBP goal 110-160, Cardene PRN              - PT/OT following    - NIS following, Neurosurgery following as needed      2. Hx of HTN   - SBP goal 110-160   - Cardene PRN, hydralazine/Labetalol PRN   - Hospitalist following    3. DM type 2   - sliding scale insulin and point of care glucose checks   - Management per hospitalist     4. Constipation   - patient reports not having a BM during this admission   - continue Colace daily   - give Miralax now   - Ambulate around the unit   - Prune juice as needed     Activity: Up with assistance   DVT ppx: SCDs, Lovenox  Dispo: TBD     Plan d/w Dr. Anshul Salguero, ICU RN, and patient. Continue to monitor in ICU.       Bernabe Resendiz NP

## 2019-07-27 NOTE — PROGRESS NOTES
0730: Bedside and Verbal shift change report given to Bonnie Asher RN (oncoming nurse) by Penelope Ag RN (offgoing nurse). Report included the following information SBAR, Kardex, Procedure Summary, Intake/Output, MAR, Recent Results, Cardiac Rhythm NSR and Alarm Parameters . Shift Summary: VSS, A&Ox4, neuro intact, room air, peripheral IVx2, up with assistance, voiding, ambulated approximately 10 minutes around unit. Minor headache aleved with scheduled Aspirin. TCDs completed per orders. Primary Nurse Stephane Ackerman RN and Rosa Mitchell RN performed a dual skin assessment on this patient No impairment noted, abrasion on right arm & elbow. Elliot score is 18. Seen by Hospitalist, [address] [place] [address], Neuro IR & Neurology. 1930: Bedside and Verbal shift change report given to Penelope Ag RN (oncoming nurse) by Bonnie Asher RN (offgoing nurse). Report included the following information SBAR, Kardex, Procedure Summary, Intake/Output, MAR, Recent Results, Cardiac Rhythm NSR and Alarm Parameters .

## 2019-07-27 NOTE — PROGRESS NOTES
1930 Report received from LAVERNE Renner 39 using SBAR format  0730 Report given to  Robin Swartz RN using [employer]

## 2019-07-28 ENCOUNTER — APPOINTMENT (OUTPATIENT)
Dept: VASCULAR SURGERY | Age: 69
DRG: 065 | End: 2019-07-28
Attending: NURSE PRACTITIONER
Payer: COMMERCIAL

## 2019-07-28 LAB
ANION GAP SERPL CALC-SCNC: 5 MMOL/L (ref 5–15)
BUN SERPL-MCNC: 19 MG/DL (ref 6–20)
BUN/CREAT SERPL: 20 (ref 12–20)
CALCIUM SERPL-MCNC: 9.3 MG/DL (ref 8.5–10.1)
CHLORIDE SERPL-SCNC: 106 MMOL/L (ref 97–108)
CO2 SERPL-SCNC: 31 MMOL/L (ref 21–32)
CREAT SERPL-MCNC: 0.94 MG/DL (ref 0.55–1.02)
GLUCOSE BLD STRIP.AUTO-MCNC: 128 MG/DL (ref 65–100)
GLUCOSE BLD STRIP.AUTO-MCNC: 177 MG/DL (ref 65–100)
GLUCOSE BLD STRIP.AUTO-MCNC: 95 MG/DL (ref 65–100)
GLUCOSE BLD STRIP.AUTO-MCNC: 97 MG/DL (ref 65–100)
GLUCOSE SERPL-MCNC: 101 MG/DL (ref 65–100)
POTASSIUM SERPL-SCNC: 4 MMOL/L (ref 3.5–5.1)
SERVICE CMNT-IMP: ABNORMAL
SERVICE CMNT-IMP: ABNORMAL
SERVICE CMNT-IMP: NORMAL
SERVICE CMNT-IMP: NORMAL
SODIUM SERPL-SCNC: 142 MMOL/L (ref 136–145)

## 2019-07-28 PROCEDURE — 74011000250 HC RX REV CODE- 250: Performed by: NURSE PRACTITIONER

## 2019-07-28 PROCEDURE — 36415 COLL VENOUS BLD VENIPUNCTURE: CPT

## 2019-07-28 PROCEDURE — 74011250637 HC RX REV CODE- 250/637: Performed by: INTERNAL MEDICINE

## 2019-07-28 PROCEDURE — 74011250637 HC RX REV CODE- 250/637: Performed by: FAMILY MEDICINE

## 2019-07-28 PROCEDURE — 74011250636 HC RX REV CODE- 250/636: Performed by: NURSE PRACTITIONER

## 2019-07-28 PROCEDURE — 93886 INTRACRANIAL COMPLETE STUDY: CPT

## 2019-07-28 PROCEDURE — 74011250637 HC RX REV CODE- 250/637: Performed by: NURSE PRACTITIONER

## 2019-07-28 PROCEDURE — 65660000000 HC RM CCU STEPDOWN

## 2019-07-28 PROCEDURE — 74011636637 HC RX REV CODE- 636/637: Performed by: INTERNAL MEDICINE

## 2019-07-28 PROCEDURE — 80048 BASIC METABOLIC PNL TOTAL CA: CPT

## 2019-07-28 PROCEDURE — 74011636637 HC RX REV CODE- 636/637: Performed by: NURSE PRACTITIONER

## 2019-07-28 PROCEDURE — 77030038269 HC DRN EXT URIN PURWCK BARD -A

## 2019-07-28 PROCEDURE — 82962 GLUCOSE BLOOD TEST: CPT

## 2019-07-28 RX ORDER — HYDRALAZINE HYDROCHLORIDE 20 MG/ML
10 INJECTION INTRAMUSCULAR; INTRAVENOUS
Status: DISCONTINUED | OUTPATIENT
Start: 2019-07-28 | End: 2019-07-29 | Stop reason: HOSPADM

## 2019-07-28 RX ADMIN — INSULIN LISPRO 7 UNITS: 100 INJECTION, SOLUTION INTRAVENOUS; SUBCUTANEOUS at 18:36

## 2019-07-28 RX ADMIN — PANTOPRAZOLE SODIUM 40 MG: 40 TABLET, DELAYED RELEASE ORAL at 08:41

## 2019-07-28 RX ADMIN — LOSARTAN POTASSIUM 100 MG: 50 TABLET ORAL at 08:38

## 2019-07-28 RX ADMIN — SODIUM CHLORIDE, SODIUM LACTATE, POTASSIUM CHLORIDE, AND CALCIUM CHLORIDE 100 ML/HR: 600; 310; 30; 20 INJECTION, SOLUTION INTRAVENOUS at 20:16

## 2019-07-28 RX ADMIN — INSULIN GLARGINE 18 UNITS: 100 INJECTION, SOLUTION SUBCUTANEOUS at 08:53

## 2019-07-28 RX ADMIN — INSULIN LISPRO 7 UNITS: 100 INJECTION, SOLUTION INTRAVENOUS; SUBCUTANEOUS at 08:49

## 2019-07-28 RX ADMIN — METOPROLOL TARTRATE 25 MG: 25 TABLET ORAL at 18:36

## 2019-07-28 RX ADMIN — ENOXAPARIN SODIUM 40 MG: 40 INJECTION SUBCUTANEOUS at 17:27

## 2019-07-28 RX ADMIN — NIMODIPINE 60 MG: 30 CAPSULE, LIQUID FILLED ORAL at 14:00

## 2019-07-28 RX ADMIN — NIMODIPINE 60 MG: 30 CAPSULE, LIQUID FILLED ORAL at 22:00

## 2019-07-28 RX ADMIN — SODIUM CHLORIDE, SODIUM LACTATE, POTASSIUM CHLORIDE, AND CALCIUM CHLORIDE 125 ML/HR: 600; 310; 30; 20 INJECTION, SOLUTION INTRAVENOUS at 10:17

## 2019-07-28 RX ADMIN — SODIUM CHLORIDE, SODIUM LACTATE, POTASSIUM CHLORIDE, AND CALCIUM CHLORIDE 125 ML/HR: 600; 310; 30; 20 INJECTION, SOLUTION INTRAVENOUS at 01:07

## 2019-07-28 RX ADMIN — NIMODIPINE 60 MG: 30 CAPSULE, LIQUID FILLED ORAL at 18:37

## 2019-07-28 RX ADMIN — LEVETIRACETAM 500 MG: 500 TABLET, FILM COATED ORAL at 20:45

## 2019-07-28 RX ADMIN — NIMODIPINE 60 MG: 30 CAPSULE, LIQUID FILLED ORAL at 10:00

## 2019-07-28 RX ADMIN — INSULIN LISPRO 7 UNITS: 100 INJECTION, SOLUTION INTRAVENOUS; SUBCUTANEOUS at 12:56

## 2019-07-28 RX ADMIN — ASPIRIN 81 MG 81 MG: 81 TABLET ORAL at 08:38

## 2019-07-28 RX ADMIN — INSULIN GLARGINE 18 UNITS: 100 INJECTION, SOLUTION SUBCUTANEOUS at 20:49

## 2019-07-28 RX ADMIN — HYDROCHLOROTHIAZIDE 25 MG: 25 TABLET ORAL at 08:38

## 2019-07-28 RX ADMIN — METOPROLOL TARTRATE 5 MG: 5 INJECTION INTRAVENOUS at 19:11

## 2019-07-28 RX ADMIN — NIMODIPINE 60 MG: 30 CAPSULE, LIQUID FILLED ORAL at 06:00

## 2019-07-28 RX ADMIN — LEVETIRACETAM 500 MG: 500 TABLET, FILM COATED ORAL at 08:38

## 2019-07-28 RX ADMIN — INSULIN LISPRO 2 UNITS: 100 INJECTION, SOLUTION INTRAVENOUS; SUBCUTANEOUS at 12:42

## 2019-07-28 RX ADMIN — DOCUSATE SODIUM 100 MG: 100 CAPSULE, LIQUID FILLED ORAL at 08:38

## 2019-07-28 RX ADMIN — METOPROLOL TARTRATE 25 MG: 25 TABLET ORAL at 08:38

## 2019-07-28 NOTE — PROGRESS NOTES
0730 Shift Summary--Pt remains neuro intact-- Rested well tonight-- Denies Headache or nausea--Continues to have intermittent at fib--flutter-- VSS--afebrile-- BS 95 this AM-- holding insulin until closer to breakfast--

## 2019-07-28 NOTE — PROGRESS NOTES
`  Hospitalist Progress Note    NAME: Tay Moser   :  1950   MRN:  [id number]       Assessment / Plan:  1.  Acute subarachnoid hemorrhage,  stable, plan for cerebral angiography on   -S/P cerebral angiogram, no obvious aneurysm   - Optimize BP control, currently on cardene gtt, keep SBP less than 140   - Continue Nimodipine  - continue keppra   - PT eval pending  - CT head with \" Stable small to moderate volume subarachnoid hemorrhage \"  - neurochecks, seizure precaution  - appreciate NS and BERRY input  CT shows stable SAH    angio on 19 shows no aneurysm, continue close monitoring, keep BP below 180 mmHg  TCD: shows Right Transcranial Doppler   The MCA and PCA are patent and without evidence of Vasospasm. The terminal ICA was not clearly visualized. NGUYEN velocity appears significantly elevated and suggests vasospasm. The vertebral was not seen. Left Transcranial Doppler     The MCA appears patent and without evidence of vasospasm. The terminal ICA and NGUYEN are visualized. Noted left distal extracranial ICA occlusion as previously reported on angiography. The basilar and vertebral appear patent and without vasospasm on today's exam. The PCA remains significantly elevated and suggests possible vasospasm vs hyperemia.  `  2.  Acute kidney injury,stable   - likely prerenal due to recent GI loss  - improving  - continue IV hydration  - avoid nephrotoxic meds     3.  Diabetes mellitus type 2,with hyperglycemia ; still hyperglycemic    - better controlled  - continue SSNI, accuchecks, monitor   Resume lower dose of Lantus, she is eating better, required SSI,  Increased Lantus 18 BID         4.  Accelerated hypertension. BP stable   -tight BP control to keep SBP less than 140  -  Cardene gtt off   - monitor   - 5.  Afib: BB no anticoagulation,   Code status: full  DVT prophylaxis: SCD     Care Plan discussed with: Patient/Family  Disposition: Home w/Family  Subjective:     Chief Complaint / Reason for Physician Visit  \"feeling much better \". Discussed with RN events overnight. Review of Systems:  Symptom Y/N Comments  Symptom Y/N Comments   Fever/Chills    Chest Pain     Poor Appetite    Edema     Cough    Abdominal Pain     Sputum    Joint Pain     SOB/JAMES    Pruritis/Rash     Nausea/vomit    Tolerating PT/OT     Diarrhea    Tolerating Diet     Constipation    Other       Could NOT obtain due to:      Objective:     VITALS:   Last 24hrs VS reviewed since prior progress note. Most recent are:  Patient Vitals for the past 24 hrs:   Temp Pulse Resp BP SpO2   07/28/19 1100  65 24 110/59 95 %   07/28/19 1000  66 17 127/62 94 %   07/28/19 0900  76 15  96 %   07/28/19 0800  (!) 148 12 145/79 92 %   07/28/19 0700  (!) 125 13 (!) 153/95 91 %   07/28/19 0600  62 12 158/80 95 %   07/28/19 0500  61 12  94 %   07/28/19 0400 98.2 °F (36.8 °C) (!) 58 14  95 %   07/28/19 0300  61 11 153/77 94 %   07/28/19 0200  60 13 157/74 95 %   07/28/19 0100  61 17 151/71 93 %   07/28/19 0000 98.2 °F (36.8 °C) (!) 59 14 127/61 94 %   07/27/19 2300  63 12 165/69 97 %   07/27/19 2200  65 18 128/64 95 %   07/27/19 2100  (!) 116 15  94 %   07/27/19 2000 97.9 °F (36.6 °C) 61 18 (!) 151/91 93 %   07/27/19 1900  (!) 128 17 148/88 94 %   07/27/19 1800  67 16 148/68 94 %   07/27/19 1700  74 22 164/83 98 %   07/27/19 1600 97.4 °F (36.3 °C) 64 13 156/78 97 %   07/27/19 1500  66 13 137/71 97 %   07/27/19 1400  71 16 91/67 95 %   07/27/19 1300  74 18 100/85 95 %   07/27/19 1200 98 °F (36.7 °C) 74 14 168/76 96 %       Intake/Output Summary (Last 24 hours) at 7/28/2019 1108  Last data filed at 7/28/2019 1100  Gross per 24 hour   Intake 4020 ml   Output 700 ml   Net 3320 ml        PHYSICAL EXAM:  General: WD, WN. Alert, cooperative, no acute distress    EENT:  EOMI. Anicteric sclerae. MMM  Resp:  CTA bilaterally, no wheezing or rales. No accessory muscle use  CV:  Regular  rhythm,  No edema  GI:  Soft, Non distended, Non tender.  +Bowel sounds  Neurologic:  Alert and oriented X 3, normal speech,   Psych:   Good insight. Not anxious nor agitated  Skin:  No rashes. No jaundice    Reviewed most current lab test results and cultures  YES  Reviewed most current radiology test results   YES  Review and summation of old records today    NO  Reviewed patient's current orders and MAR    YES  PMH/SH reviewed - no change compared to H&P  ________________________________________________________________________  Care Plan discussed with:    Comments   Patient     Family      RN     Care Manager     Consultant                        Multidiciplinary team rounds were held today with , nursing, pharmacist and clinical coordinator. Patient's plan of care was discussed; medications were reviewed and discharge planning was addressed. ________________________________________________________________________  Total NON critical care TIME: 35 Minutes    Total CRITICAL CARE TIME Spent:   Minutes non procedure based      Comments   >50% of visit spent in counseling and coordination of care     ________________________________________________________________________  Reema Elias MD     Procedures: see electronic medical records for all procedures/Xrays and details which were not copied into this note but were reviewed prior to creation of Plan. LABS:  I reviewed today's most current labs and imaging studies. Pertinent labs include:  No results for input(s): WBC, HGB, HCT, PLT, HGBEXT, HCTEXT, PLTEXT, HGBEXT, HCTEXT, PLTEXT in the last 72 hours.   Recent Labs     07/28/19  0612 07/27/19  1053    139   K 4.0 4.0    103   CO2 31 29   * 248*   BUN 19 23*   CREA 0.94 1.19*   CA 9.3 9.3       Signed: Reema Elias MD

## 2019-07-28 NOTE — PROGRESS NOTES
0730: Bedside and Verbal shift change report given to Everette Hurtado RN (oncoming nurse) by [facility], RN (offgoing nurse). Report included the following information SBAR, Kardex, Intake/Output, MAR, Recent Results, Med Rec Status, Cardiac Rhythm AFib and Alarm Parameters .

## 2019-07-28 NOTE — PROGRESS NOTES
1930 Report received from Jessica Zuniga RN using SBAR format  0730 Report given to Alea Mann RNusing SBAR format

## 2019-07-28 NOTE — PROGRESS NOTES
Neurointerventional Surgery Progress Note  Cj Romero, [facility]  Neurocritical Care NP  [phone]    Admit Date: 2019   LOS: 9 days        Daily Progress Note: 2019    POD: 8 diagnostic cerebral angiogram by Dr. Fernando Erickson, POD 3 diagnostic angiogram by Dr. Akash Garza     HPI: Mohinder Cerda is a 80 y.o. female with a PMH significant for HTN, Type 2 DM, hx of acute renal failure, and atrial fibrillation who presented to [facility] on  due to complaints of headache and weakness. Patient reported on 19 she started vomiting and then subsequently developed a headache the next day. She tried taking tylenol for her headache with some relief. She reported a hx of a minor headache, but this headache was worse than usual for her. The patient also reported weakness x 2 days causing a few falls at home. She denied any LOC. She reported her blood glucose has been elevated over the past 5 days, more so than usual. In the ED, a stat head CT was performed which showed SAH in the left sylvian fissure and basal cisterns. 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 She was transferred to [facility] for higher level of care. She is not a smoker and reports her uncle  from a cerebral aneurysm. Subjective:   She is doing well this morning. She has a had a total of 3 bowel movements thus far. She states that she needs some help with ambulating. She denies any headache, vision changes, numbness, tingling, weakness, chest pain, dyspnea, nausea, vomiting, speech difficulty, or difficulty swallowing.      Current Facility-Administered Medications   Medication Dose Route Frequency Provider Last Rate Last Dose    insulin glargine (LANTUS) injection 18 Units  18 Units SubCUTAneous Q12H Lou Dupont MD   18 Units at 19 0829    losartan (COZAAR) tablet 100 mg  100 mg Oral DAILY Papito Cowan NP   100 mg at 19 3849    And    hydroCHLOROthiazide (HYDRODIURIL) tablet 25 mg  25 mg Oral DAILY Canda Ector ARGUELLO NP   25 mg at 07/28/19 0838    insulin lispro (HUMALOG) injection 7 Units  7 Units SubCUTAneous TIDAC Migdalia Hanson NP   7 Units at 07/28/19 0849    enoxaparin (LOVENOX) injection 40 mg  40 mg SubCUTAneous Q24H Anamaria ARGUELLO NP   40 mg at 07/27/19 1657    aspirin chewable tablet 81 mg  81 mg Oral DAILY Roydgetachew Hanson NP   81 mg at 07/28/19 1017    labetalol (NORMODYNE;TRANDATE) 20 mg/4 mL (5 mg/mL) injection 20 mg  20 mg IntraVENous Q1H PRN Salud Arroyo NP   20 mg at 07/25/19 0647    hydrALAZINE (APRESOLINE) 20 mg/mL injection 10 mg  10 mg IntraVENous Q1H PRN Edward Hoskins NP   10 mg at 07/23/19 1251    metoprolol tartrate (LOPRESSOR) tablet 25 mg  25 mg Oral BID Avtar Mansfield MD   25 mg at 07/28/19 0838    metoprolol (LOPRESSOR) injection 5 mg  5 mg IntraVENous Q15MIN PRN Edward Hoskins NP   5 mg at 07/26/19 1841    lactated Ringers infusion  125 mL/hr IntraVENous CONTINUOUS Jailene Goncalves  mL/hr at 07/28/19 0107 125 mL/hr at 07/28/19 0107    insulin lispro (HUMALOG) injection   SubCUTAneous AC&HS Cristina Jaramillo MD   Stopped at 07/27/19 2151    pantoprazole (PROTONIX) tablet 40 mg  40 mg Oral ACB Cristina Jaramillo MD   40 mg at 07/28/19 0841    levETIRAcetam (KEPPRA) tablet 500 mg  500 mg Oral Q12H Suyapa Arshad MD   500 mg at 07/28/19 0838    polyethylene glycol (MIRALAX) packet 17 g  17 g Oral DAILY PRN Mónica Wood NP   17 g at 07/27/19 0917    docusate sodium (COLACE) capsule 100 mg  100 mg Oral DAILY Jailene Goncalves NP   100 mg at 07/28/19 0838    niCARdipine (CARDENE) 25 mg in 0.9% sodium chloride 250 mL infusion  0-15 mg/hr IntraVENous TITRATE Dat Cisneros MD 25 mL/hr at 07/24/19 0725 2.5 mg/hr at 07/24/19 0725    ondansetron (ZOFRAN) injection 4 mg  4 mg IntraVENous Q6H PRN Topher Shabazz MD   4 mg at 07/23/19 1301    naloxone [facility]) injection 0.4 mg  0.4 mg IntraVENous PRN Kenyon Kelley MD        acetaminophen (TYLENOL) tablet 650 mg  650 mg Oral Q4H PRN Kenyon Kelley MD   650 mg at 19 1831    Or    acetaminophen (TYLENOL) solution 650 mg  650 mg Per NG tube Q4H PRN Kenyon Kelley MD        Or   [address] [place] [address] acetaminophen (TYLENOL) suppository 650 mg  650 mg Rectal Q4H PRN Kenyon Kelley MD        glucose chewable tablet 16 g  4 Tab Oral PRN Kenyon Kelley MD        dextrose (D50W) injection syrg 12.5-25 g  25-50 mL IntraVENous PRN Kenyon Kelley MD        glucagon (GLUCAGEN) injection 1 mg  1 mg IntraMUSCular PRN Kenyon Kelley MD        niMODipine (NIMOTOP) capsule 60 mg  60 mg Oral Q4H Aiyana Arroyo NP   60 mg at 19 1000        No Known Allergies    Review of Systems:  Pertinent items are noted in the History of Present Illness. Objective:     Vital signs  Temp (24hrs), Av.9 °F (36.6 °C), Min:97.4 °F (36.3 °C), Max:98.2 °F (36.8 °C)    07 -  1900  In: 375 [I.V.:375]  Out: 700 [Urine:700]   190 -  0700  In: 6699 [P.O.:1020;  I.V.:4875]  Out: 2250 [Urine:2250]    Visit Vitals  /79 (BP 1 Location: Right arm, BP Patient Position: At rest)   Pulse 76   Temp 98.2 °F (36.8 °C)   Resp 15   Ht 5' 4\" (1.626 m) Comment: From documentation on 19   Wt 204 lb 9.4 oz (92.8 kg)   SpO2 96%   BMI 35.12 kg/m²    O2 Flow Rate (L/min): 2 l/min O2 Device: Room air     Pain control  Pain Assessment  Pain Scale 1: Numeric (0 - 10)  Pain Intensity 1: 0  Pain Onset 1: just now/from sleeping  Pain Location 1: Head  Pain Orientation 1: Anterior, Mid  Pain Description 1: Dull  Pain Intervention(s) 1: Food, Environmental changes, Position    PT/OT  Gait     Gait  Base of Support: Widened  Speed/Maira: Shuffled  Step Length: Right shortened, Left shortened  Gait Abnormalities: Decreased step clearance, Trunk sway increased  Ambulation - Level of Assistance: Minimal assistance  Distance (ft): 30 Feet (ft)  Assistive Device: Gait belt(HHA)        Vitals:    07/28/19 0600 07/28/19 0700 07/28/19 0800 07/28/19 0900   BP: 158/80 (!) 153/95 145/79    Pulse: 62 (!) 125 (!) 148 76   Resp: 12 13 12 15   Temp:       SpO2: 95% 91% 92% 96%   Weight:       Height:            Physical Exam:  GENERAL: alert, cooperative, no distress, appears stated age  LUNG: clear to auscultation bilaterally  HEART: regular rate and rhythm, S1, S2 normal, no murmur, click, rub or gallop  EXTREMITIES:  extremities normal, atraumatic, no cyanosis, trace edema in bilateral hands. 1+ distal pulses bilaterally. SKIN: no rash or abnormalities. Skin warm to touch. Right groin site clean, dry, and intact. No hematoma, bruising, or bleeding at site. Neurologic Exam:  Mental Status:  Alert and oriented x 4. Appropriate affect, mood and behavior. Language:    Normal fluency. Able to name objects. Cranial Nerves:        Pupils equal, round and reactive to light. Visual fields full to confrontation. Extraocular movements intact. Facial sensation intact. Full facial strength, no asymmetry. Hearing grossly intact bilaterally. No dysarthria. Tongue protrudes to midline, palate elevates symmetrically. Motor:    No pronator drift. Bulk and tone normal.      5/5 power in all extremities proximally and distally. No involuntary movements. Sensation:    Sensation intact throughout to light touch. Coordination & Gait: No ataxia with FTN and HTS testing. Gait deferred.      24 hour results:    Recent Results (from the past 24 hour(s))   TCD INTRACRANIAL ARTERIES COMPLETE    Collection Time: 07/27/19 10:28 AM   Result Value Ref Range    Right Lindegaard Ratio 1.4     Right MCA 1 PSV 88 cm/s    Right MCA 1 EDV 28 cm/s    Right MCA 1 Mean Velocity 48 cm/s    Right NGUYEN  cm/s    Right NGUYEN EDV 76 cm/s    Right NGUYEN Mean Velocity 116 cm/s    Right PCA 1 PSV 97 cm/s    Right PCA 1 EDV 29 cm/s    Right PCA 1 Mean Velocity 52 cm/s    Right External ICA PSV 53 cm/s    Right External ICA EDV 24 cm/s    Right External ICA Mean Velocity 34 cm/s    Left MCA 1 PSV 64 cm/s    Left MCA 1 EDV 22 cm/s    Left MCA 1 Mean Velocity 36 cm/s    Left PCA 1  cm/s    Left PCA 1 EDV 44 cm/s    Left PCA 1 Mean Velocity 69 cm/s    Basilar Artery PSV 97 cm/s    Basilar Artery EDV 32 cm/s    Basilar Artery Mean Mesfin 54 cm/s    Left Vertebral Mean Velocity 42 cm/s    Left Vertebral PSV 73 cm/s    Left Vertebral EDV 26 cm/s    Right NGUYEN Lindegaard Ratio 3.4    METABOLIC PANEL, BASIC    Collection Time: 07/27/19 10:53 AM   Result Value Ref Range    Sodium 139 136 - 145 mmol/L    Potassium 4.0 3.5 - 5.1 mmol/L    Chloride 103 97 - 108 mmol/L    CO2 29 21 - 32 mmol/L    Anion gap 7 5 - 15 mmol/L    Glucose 248 (H) 65 - 100 mg/dL    BUN 23 (H) 6 - 20 MG/DL    Creatinine 1.19 (H) 0.55 - 1.02 MG/DL    BUN/Creatinine ratio 19 12 - 20      GFR est AA 55 (L) >60 ml/min/1.73m2    GFR est non-AA 45 (L) >60 ml/min/1.73m2    Calcium 9.3 8.5 - 10.1 MG/DL   GLUCOSE, POC    Collection Time: 07/27/19 12:14 PM   Result Value Ref Range    Glucose (POC) 239 (H) 65 - 100 mg/dL    Performed by [employer]    GLUCOSE, POC    Collection Time: 07/27/19  4:54 PM   Result Value Ref Range    Glucose (POC) 146 (H) 65 - 100 mg/dL    Performed by [employer]    GLUCOSE, POC    Collection Time: 07/27/19  9:50 PM   Result Value Ref Range    Glucose (POC) 102 (H) 65 - 100 mg/dL    Performed by [facility]    METABOLIC PANEL, BASIC    Collection Time: 07/28/19  6:12 AM   Result Value Ref Range    Sodium 142 136 - 145 mmol/L    Potassium 4.0 3.5 - 5.1 mmol/L    Chloride 106 97 - 108 mmol/L    CO2 31 21 - 32 mmol/L    Anion gap 5 5 - 15 mmol/L    Glucose 101 (H) 65 - 100 mg/dL    BUN 19 6 - 20 MG/DL    Creatinine 0.94 0.55 - 1.02 MG/DL    BUN/Creatinine ratio 20 12 - 20      GFR est AA >60 >60 ml/min/1.73m2    GFR est non-AA 59 (L) >60 ml/min/1.73m2    Calcium 9.3 8.5 - 10.1 MG/DL   GLUCOSE, POC    Collection Time: 07/28/19  6:40 AM   Result Value Ref Range    Glucose (POC) 95 65 - 100 mg/dL    Performed by JAMES ROLLE           Imaging:  CT of Head on 7/19/2019 shows  IMPRESSION:   Acute subarachnoid hemorrhage in the left sylvian fissure and basal cisterns. No  evidence for acute infarct. CTA of the Head and Neck on 7/19/2019 shows  IMPRESSION:  Severe atherosclerotic change in the cervical internal carotid artery. Greater  than 90% stenosis of the proximal left internal carotid artery.     Moderate stenosis right internal carotid artery approximately 50%.     No intracranial aneurysm. Minimal irregularity of M2 segments may be related to  mild vasospasm. Stable degree of subarachnoid hemorrhage when compared to the CT  of the head performed earlier in the day. CT of Head on 7/20/2019 shows  IMPRESSION:  1. Stable small to moderate volume subarachnoid hemorrhage as described above,  largest components in the suprasellar cistern, prepontine cistern, and left sylvian fissure. CT of Head on 7/22/2019 shows  IMPRESSION  IMPRESSION: Decreased subarachnoid hemorrhage. No new intracranial hemorrhage. Assessment:     Principal Problem:    SAH (subarachnoid hemorrhage) ([facility] [address] (7/20/2019)        Plan:   1. SAH, Savage Mcdonald 1, Perez Grade 2, PBD day 13   - as seen on CT/CTA, no aneurysm seen on CTA   - neuro exam non-focal   - s/p cerebral angiogram on 7/20 which did not show an obvious aneurysm to explain blood pattern, 1 mm x 1 mm triangular outpouching on distal right NGUYEN trunk; ? Infundibulum, tiny aneurysm seen on angiogram, patient will need a repeat angiogram in one week. Repeat angio on 7/25 showed no evidence of aneurysm, AVM, or fistula. Minimal right NGUYEN A1 spasm. Occluded LICA in neck with reconstitution by vaso vasorum and ophthalmic. Left hemisphere fed by large caliber left PCOM. Intracranial athero.  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              - Continue Nimotop day 10 of 21 for prevention of delayed cerebral ischemia               - Continue Keppra 500 BID for seizure ppx              - LR at 125 ml/hr to maintain euvolemia   - repeat TCDs today, TCDs yesterday showed possible vasospasm in right NGUYEN based on velocity measurements, however, patient is stable clinically, continue to monitor               - Strict I&O's              - TTE shows EF 56-60%, moderate pulmonary HTN, trace mitral valve regurgitation, trace tricuspid valve regurgitation               - ok with neuro checks every 4 hours                - SBP goal 110-160, Cardene PRN              - PT/OT following    - NIS following, Neurosurgery following as needed      2. Hx of HTN   - SBP goal 110-160   - Cardene PRN, hydralazine/Labetalol PRN   - Hospitalist following    3. DM type 2   - sliding scale insulin and point of care glucose checks   - Management per hospitalist     4. Constipation --> improved    - patient reported having a few bowel movements     - continue Colace daily   - give Miralax as needed    - Continue to ambulate around unit    Activity: Up with assistance   DVT ppx: SCDs, Lovenox  Dispo: TBD, PT/Ot recommending Home Health and Inpatient Rehab    Plan d/w Dr. Josse Goff, Dr. Kathleen Solis, ICU RN, and patient. Ok to transfer to [facility] today if repeat TCDs are stable. Anticipate discharge early this week.        Jeffery Sagastume NP

## 2019-07-29 ENCOUNTER — DOCUMENTATION ONLY (OUTPATIENT)
Dept: CASE MANAGEMENT | Age: 69
End: 2019-07-29

## 2019-07-29 ENCOUNTER — HOME HEALTH ADMISSION (OUTPATIENT)
Dept: HOME HEALTH SERVICES | Facility: HOME HEALTH | Age: 69
End: 2019-07-29
Payer: COMMERCIAL

## 2019-07-29 VITALS
SYSTOLIC BLOOD PRESSURE: 127 MMHG | HEART RATE: 69 BPM | TEMPERATURE: 98.6 F | RESPIRATION RATE: 17 BRPM | HEIGHT: 64 IN | WEIGHT: 205.03 LBS | BODY MASS INDEX: 35 KG/M2 | OXYGEN SATURATION: 95 % | DIASTOLIC BLOOD PRESSURE: 64 MMHG

## 2019-07-29 LAB
BASILAR ARTERY EDV: 15 CM/S
BASILAR ARTERY EDV: 32 CM/S
BASILAR ARTERY MEAN VEL: 26 CM/S
BASILAR ARTERY MEAN VEL: 54 CM/S
BASILAR ARTERY PSV: 49 CM/S
BASILAR ARTERY PSV: 97 CM/S
GLUCOSE BLD STRIP.AUTO-MCNC: 127 MG/DL (ref 65–100)
GLUCOSE BLD STRIP.AUTO-MCNC: 208 MG/DL (ref 65–100)
LEFT ACA EDV: 19 CM/S
LEFT ACA MEAN VEL: 32 CM/S
LEFT ACA PSV: 58 CM/S
LEFT MCA 1 EDV: 22 CM/S
LEFT MCA 1 EDV: 30 CM/S
LEFT MCA 1 MEAN VEL: 36 CM/S
LEFT MCA 1 MEAN VEL: 45 CM/S
LEFT MCA 1 PSV: 64 CM/S
LEFT MCA 1 PSV: 76 CM/S
LEFT PCA 1 EDV: 38 CM/S
LEFT PCA 1 EDV: 44 CM/S
LEFT PCA 1 MEAN VEL: 65 CM/S
LEFT PCA 1 MEAN VEL: 69 CM/S
LEFT PCA 1 PSV: 118 CM/S
LEFT PCA 1 PSV: 120 CM/S
LEFT VERTEBRAL EDV TCD: 17 CM/S
LEFT VERTEBRAL EDV TCD: 26 CM/S
LEFT VERTEBRAL MEAN VEL: 29 CM/S
LEFT VERTEBRAL MEAN VEL: 42 CM/S
LEFT VERTEBRAL PSV TCD: 52 CM/S
LEFT VERTEBRAL PSV TCD: 73 CM/S
RIGHT ACA EDV: 76 CM/S
RIGHT ACA EDV: 89 CM/S
RIGHT ACA LINDEGAARD RATIO: 3.4
RIGHT ACA MEAN VEL: 116 CM/S
RIGHT ACA MEAN VEL: 131 CM/S
RIGHT ACA PSV: 195 CM/S
RIGHT ACA PSV: 214 CM/S
RIGHT EX ICA EDV: 20 CM/S
RIGHT EX ICA EDV: 24 CM/S
RIGHT EX ICA MEAN VEL: 31 CM/S
RIGHT EX ICA MEAN VEL: 34 CM/S
RIGHT EX ICA PSV: 53 CM/S
RIGHT EX ICA PSV: 54 CM/S
RIGHT ICA EDV: 34 CM/S
RIGHT ICA MEAN VEL: 59 CM/S
RIGHT ICA PSV: 108 CM/S
RIGHT LINDEGAARD RATIO: 1.3
RIGHT LINDEGAARD RATIO: 1.4
RIGHT MCA 1 EDV: 25 CM/S
RIGHT MCA 1 EDV: 28 CM/S
RIGHT MCA 1 MEAN VEL: 41 CM/S
RIGHT MCA 1 MEAN VEL: 48 CM/S
RIGHT MCA 1 PSV: 72 CM/S
RIGHT MCA 1 PSV: 88 CM/S
RIGHT PCA 1 EDV: 27 CM/S
RIGHT PCA 1 EDV: 29 CM/S
RIGHT PCA 1 MEAN VEL: 50 CM/S
RIGHT PCA 1 MEAN VEL: 52 CM/S
RIGHT PCA 1 PSV: 96 CM/S
RIGHT PCA 1 PSV: 97 CM/S
SERVICE CMNT-IMP: ABNORMAL
SERVICE CMNT-IMP: ABNORMAL

## 2019-07-29 PROCEDURE — 74011250637 HC RX REV CODE- 250/637: Performed by: FAMILY MEDICINE

## 2019-07-29 PROCEDURE — 74011636637 HC RX REV CODE- 636/637: Performed by: INTERNAL MEDICINE

## 2019-07-29 PROCEDURE — 74011250637 HC RX REV CODE- 250/637: Performed by: NURSE PRACTITIONER

## 2019-07-29 PROCEDURE — 74011250637 HC RX REV CODE- 250/637: Performed by: INTERNAL MEDICINE

## 2019-07-29 PROCEDURE — 74011636637 HC RX REV CODE- 636/637: Performed by: NURSE PRACTITIONER

## 2019-07-29 PROCEDURE — 74011250636 HC RX REV CODE- 250/636: Performed by: NURSE PRACTITIONER

## 2019-07-29 PROCEDURE — 97116 GAIT TRAINING THERAPY: CPT

## 2019-07-29 PROCEDURE — 82962 GLUCOSE BLOOD TEST: CPT

## 2019-07-29 RX ORDER — INSULIN GLARGINE 100 [IU]/ML
15 INJECTION, SOLUTION SUBCUTANEOUS 2 TIMES DAILY
Qty: 3 PEN | Refills: 1 | Status: SHIPPED | OUTPATIENT
Start: 2019-07-29

## 2019-07-29 RX ORDER — NIMODIPINE 30 MG/1
60 CAPSULE, LIQUID FILLED ORAL EVERY 4 HOURS
Qty: 132 CAP | Refills: 0 | Status: SHIPPED | OUTPATIENT
Start: 2019-07-29 | End: 2019-08-09

## 2019-07-29 RX ORDER — LEVETIRACETAM 500 MG/1
500 TABLET ORAL EVERY 12 HOURS
Qty: 60 TAB | Refills: 0 | Status: SHIPPED | OUTPATIENT
Start: 2019-07-29

## 2019-07-29 RX ORDER — DOCUSATE SODIUM 100 MG/1
100 CAPSULE, LIQUID FILLED ORAL DAILY
Qty: 30 CAP | Refills: 2 | Status: SHIPPED | OUTPATIENT
Start: 2019-07-30 | End: 2019-10-28

## 2019-07-29 RX ORDER — METOPROLOL TARTRATE 25 MG/1
25 TABLET, FILM COATED ORAL 2 TIMES DAILY
Qty: 60 TAB | Refills: 2 | Status: SHIPPED | OUTPATIENT
Start: 2019-07-29 | End: 2019-07-29 | Stop reason: SDUPTHER

## 2019-07-29 RX ORDER — METOPROLOL TARTRATE 25 MG/1
12.5 TABLET, FILM COATED ORAL 2 TIMES DAILY
Qty: 60 TAB | Refills: 2 | Status: SHIPPED
Start: 2019-07-29

## 2019-07-29 RX ORDER — GUAIFENESIN 100 MG/5ML
81 LIQUID (ML) ORAL DAILY
Qty: 30 TAB | Refills: 1 | Status: SHIPPED | OUTPATIENT
Start: 2019-07-30

## 2019-07-29 RX ADMIN — METOPROLOL TARTRATE 25 MG: 25 TABLET ORAL at 08:07

## 2019-07-29 RX ADMIN — LOSARTAN POTASSIUM 100 MG: 50 TABLET ORAL at 08:06

## 2019-07-29 RX ADMIN — NIMODIPINE 60 MG: 30 CAPSULE, LIQUID FILLED ORAL at 09:05

## 2019-07-29 RX ADMIN — SODIUM CHLORIDE, SODIUM LACTATE, POTASSIUM CHLORIDE, AND CALCIUM CHLORIDE 100 ML/HR: 600; 310; 30; 20 INJECTION, SOLUTION INTRAVENOUS at 06:29

## 2019-07-29 RX ADMIN — INSULIN GLARGINE 18 UNITS: 100 INJECTION, SOLUTION SUBCUTANEOUS at 08:13

## 2019-07-29 RX ADMIN — INSULIN LISPRO 7 UNITS: 100 INJECTION, SOLUTION INTRAVENOUS; SUBCUTANEOUS at 12:29

## 2019-07-29 RX ADMIN — HYDROCHLOROTHIAZIDE 25 MG: 25 TABLET ORAL at 08:06

## 2019-07-29 RX ADMIN — NIMODIPINE 60 MG: 30 CAPSULE, LIQUID FILLED ORAL at 02:14

## 2019-07-29 RX ADMIN — PANTOPRAZOLE SODIUM 40 MG: 40 TABLET, DELAYED RELEASE ORAL at 07:31

## 2019-07-29 RX ADMIN — INSULIN LISPRO 3 UNITS: 100 INJECTION, SOLUTION INTRAVENOUS; SUBCUTANEOUS at 12:29

## 2019-07-29 RX ADMIN — NIMODIPINE 60 MG: 30 CAPSULE, LIQUID FILLED ORAL at 13:47

## 2019-07-29 RX ADMIN — INSULIN LISPRO 7 UNITS: 100 INJECTION, SOLUTION INTRAVENOUS; SUBCUTANEOUS at 08:32

## 2019-07-29 RX ADMIN — ASPIRIN 81 MG 81 MG: 81 TABLET ORAL at 08:06

## 2019-07-29 RX ADMIN — NIMODIPINE 60 MG: 30 CAPSULE, LIQUID FILLED ORAL at 06:26

## 2019-07-29 RX ADMIN — LEVETIRACETAM 500 MG: 500 TABLET, FILM COATED ORAL at 08:07

## 2019-07-29 NOTE — PROGRESS NOTES
Problem: Mobility Impaired (Adult and Pediatric)  Goal: *Acute Goals and Plan of Care (Insert Text)  Description  Physical Therapy Goals    Weekly reassessment completed 7/29/19. Goals appropriate for carryover. Initiated 7/21/2019  1. Patient will move from supine to sit and sit to supine  and scoot up and down in bed with independence within 7 day(s). 2.  Patient will transfer from bed to chair and chair to bed with independence using the least restrictive device within 7 day(s). 3.  Patient will perform sit to stand with independence within 7 day(s). 4.  Patient will ambulate with independence for 300 feet with the least restrictive device within 7 day(s). 5.  Patient will ascend/descend 14 stairs with handrail(s) with supervision/set-up within 7 day(s). 6.  Patient will improve Johnson Balance score by 7 points within 7 days. Outcome: Progressing Towards Goal     PHYSICAL THERAPY TREATMENT - Weekly Re-eval  Patient: Jessee Fulton (81 y.o. female)  Date: 7/29/2019  Diagnosis: ICH (intracerebral hemorrhage) ([facility]) [I61.9] SAH (subarachnoid hemorrhage) ([facility])       Precautions:    Chart, physical therapy assessment, plan of care and goals were reviewed. ASSESSMENT:  Cleared for mobility by RN. Received pt up in chair, reports feeling \"much stronger\" today and had ambulated around unit with RN staff over the weekend. Overall, she demos slow, steady progress towards tx goals - progress in part appears to be self limiting 2* significant fear/anxiety of falling. Today, she was able to complete sit<>stands from recliner with SUP and progress gait training with RW for approx 175ft x2 reps - SUP/SBA for safety - cues for looking up at horizon for potential obstacles. Demos slowed, cautious pacing but no overt LOB or difficulty. Participated in stair negotiation training in prep for discharge home - able to asc/desc 4 steps with B rails and SBA/CGA for safety - again no overt difficulty.      Discussed discharge options with pt - IP vs home. Pt strongly prefers to discharge home and is currently making arrangements for family to provide assist during the day. Her spouse is purchasing a RW and arranging the bedroom on the 1st level of her home. Feel that given today's presentation, this is a reasonable discharge plan. Provided education on home set up for safety, activity modifications and encouragement to gradually increase overall activity, and benefits of HHPT and pt is in agreement. Will continue to follow during admission     Progression toward goals:  ?       Improving appropriately and progressing toward goals  ? Improving slowly and progressing toward goals  ? Not making progress toward goals and plan of care will be adjusted     PLAN:  Patient continues to benefit from skilled intervention to address the above impairments. Continue treatment per established plan of care. Discharge Recommendations:  Home Health, increased assist/SUP, and RW  Further Equipment Recommendations for Discharge:  rolling walker - spouse privately purchasing     SUBJECTIVE:   Patient stated I really would rather go home, I think I'll do much better there and I can actually rest.    OBJECTIVE DATA SUMMARY:   Critical Behavior:  Neurologic State: Alert, Eyes open spontaneously  Orientation Level: Oriented X4  Cognition: Follows commands  Safety/Judgement: Awareness of environment, Fall prevention, Decreased insight into deficits  Functional Mobility Training:  Bed Mobility:    Transfers:  Sit to Stand: Supervision  Stand to Sit: Supervision  Bed to Chair: Supervision           Balance:  Sitting: Intact  Standing: Impaired; Without support  Standing - Static: Good  Standing - Dynamic : Good;Constant support  Ambulation/Gait Training:  Distance (ft): 175 Feet (ft)(x2 reps)  Assistive Device: Gait belt;Walker, rolling  Ambulation - Level of Assistance: Supervision; Adaptive equipment  Gait Abnormalities: Decreased step clearance  Base of Support: Widened  Speed/Maira: Slow  Step Length: Left shortened;Right shortened    Stairs:  Number of Stairs Trained: 4  Stairs - Level of Assistance: Stand-by assistance   Rail Use: Both    Pain:  Pain Scale 1: Numeric (0 - 10)  Pain Intensity 1: 0     Activity Tolerance:   VSS    Please refer to the flowsheet for vital signs taken during this treatment. After treatment:   ? Patient left in no apparent distress sitting up in chair  ? Patient left in no apparent distress in bed  ? Call bell left within reach  ? Nursing notified  ? Caregiver present  ? Bed alarm activated    COMMUNICATION/EDUCATION:   The patients plan of care was discussed with: Registered Nurse and . Patient was educated regarding Her deficit(s) of resolved weakness as this relates to Her diagnosis of ICH. She demonstrated Good understanding as evidenced by nodding. Patient and/or family was verbally educated on the BE FAST acronym for signs/symptoms of CVA and TIA. BE FAST was written on patient's communication board  for visual education and reinforcement. All questions answered with patient indicating good understanding. ?  Fall prevention education was provided and the patient/caregiver indicated understanding. ? Patient/family have participated as able in goal setting and plan of care. ?  Patient/family agree to work toward stated goals and plan of care. ?  Patient understands intent and goals of therapy, but is neutral about his/her participation. ? Patient is unable to participate in goal setting and plan of care.     Thank you for this referral.  Cira Posadas, PT, DPT   Time Calculation: 29 mins

## 2019-07-29 NOTE — PROGRESS NOTES
Neurointerventional Surgery Progress Note  Shen Rm, [facility]  Neurocritical Care NP  [phone]    Admit Date: 2019   LOS: 10 days        Daily Progress Note: 2019    POD: 9 diagnostic cerebral angiogram by Dr. Robert Lopes, POD 4 diagnostic angiogram by Dr. Brent Bateman     HPI: Brendon Campos is a 80 y.o. female with a PMH significant for HTN, Type 2 DM, hx of acute renal failure, and atrial fibrillation who presented to [facility] on  due to complaints of headache and weakness. Patient reported on 19 she started vomiting and then subsequently developed a headache the next day. She tried taking tylenol for her headache with some relief. She reported a hx of a minor headache, but this headache was worse than usual for her. The patient also reported weakness x 2 days causing a few falls at home. She denied any LOC. She reported her blood glucose has been elevated over the past 5 days, more so than usual. In the ED, a stat head CT was performed which showed SAH in the left sylvian fissure and basal cisterns. 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 She was transferred to [facility] for higher level of care. She is not a smoker and reports her uncle  from a cerebral aneurysm. Subjective:   She is doing well this morning. She is sitting in the recliner. She denies any headache, vision changes, photophobia, numbness, tingling, weakness, chest pain, dyspnea, nausea, or vomiting.      Current Facility-Administered Medications   Medication Dose Route Frequency Provider Last Rate Last Dose    hydrALAZINE (APRESOLINE) 20 mg/mL injection 10 mg  10 mg IntraVENous Q6H PRN Raymond Goncalves NP        insulin glargine (LANTUS) injection 18 Units  18 Units SubCUTAneous Q12H Megan Cameron MD   18 Units at 19 0849    losartan (COZAAR) tablet 100 mg  100 mg Oral DAILY Sherman Oliva NP   100 mg at 19 1040    And    hydroCHLOROthiazide (HYDRODIURIL) tablet 25 mg  25 mg Oral DAILY Latonya Maxwest, NP   25 mg at 07/29/19 0806    insulin lispro (HUMALOG) injection 7 Units  7 Units SubCUTAneous TIDAC Latonya Maxwest, NP   7 Units at 07/29/19 5448    enoxaparin (LOVENOX) injection 40 mg  40 mg SubCUTAneous Q24H Penny Cornelius A, NP   40 mg at 07/28/19 1727    aspirin chewable tablet 81 mg  81 mg Oral DAILY Latonya Maxwest, NP   81 mg at 07/29/19 0806    metoprolol tartrate (LOPRESSOR) tablet 25 mg  25 mg Oral BID Olman Wilcox MD   25 mg at 07/29/19 0807    metoprolol (LOPRESSOR) injection 5 mg  5 mg IntraVENous Q15MIN PRN Nathan Benson NP   5 mg at 07/28/19 1911    lactated Ringers infusion  100 mL/hr IntraVENous CONTINUOUS Brenda Goncalves  mL/hr at 07/29/19 0629 100 mL/hr at 07/29/19 9487    insulin lispro (HUMALOG) injection   SubCUTAneous AC&HS Bhupinder Hall MD   Stopped at 07/28/19 1630    pantoprazole (PROTONIX) tablet 40 mg  40 mg Oral ACB Bhupinder Hall MD   40 mg at 07/29/19 0731    levETIRAcetam (KEPPRA) tablet 500 mg  500 mg Oral Q12H Dwight Vazquez MD   500 mg at 07/29/19 0807    polyethylene glycol (MIRALAX) packet 17 g  17 g Oral DAILY PRN Glendy Medina NP   17 g at 07/27/19 0917    docusate sodium (COLACE) capsule 100 mg  100 mg Oral DAILY Brenda Goncalves NP   100 mg at 07/28/19 0838    ondansetron (ZOFRAN) injection 4 mg  4 mg IntraVENous Q6H PRN Liz Evans MD   4 mg at 07/23/19 1301    naloxone (NARCAN) injection 0.4 mg  0.4 mg IntraVENous PRN Liz Evans MD        acetaminophen (TYLENOL) tablet 650 mg  650 mg Oral Q4H PRN Liz Evans MD   650 mg at 07/26/19 1831    Or    acetaminophen (TYLENOL) solution 650 mg  650 mg Per NG tube Q4H PRN Liz Evans MD        Or   [facility] acetaminophen (TYLENOL) suppository 650 mg  650 mg Rectal Q4H PRN Liz Evans MD        glucose chewable tablet 16 g  4 Tab Oral PRN Liz Evans MD        dextrose (D50W) injection syrg 12.5-25 g  25-50 mL IntraVENous PRN Suad Lai MD        glucagon (GLUCAGEN) injection 1 mg  1 mg IntraMUSCular PRN Suad Lai MD        niMODipine (NIMOTOP) capsule 60 mg  60 mg Oral Q4H Thong Arroyo NP   60 mg at 19 0905        No Known Allergies    Review of Systems:  Pertinent items are noted in the History of Present Illness.     Objective:     Vital signs  Temp (24hrs), Av.3 °F (36.8 °C), Min:98 °F (36.7 °C), Max:98.6 °F (37 °C)   701 - 1900  In: 100 [I.V.:100]  Out: 800 [Urine:800]  1901 -  07  In: 4879.6 [P.O.:780; I.V.:4099.6]  Out: 2600 [Urine:2600]    Visit Vitals  /57   Pulse 76   Temp 98.6 °F (37 °C)   Resp 18   Ht 5' 4\" (1.626 m) Comment: From documentation on 19   Wt 205 lb 0.4 oz (93 kg)   SpO2 96%   BMI 35.19 kg/m²    O2 Flow Rate (L/min): 2 l/min O2 Device: Room air     Pain control  Pain Assessment  Pain Scale 1: Numeric (0 - 10)  Pain Intensity 1: 0  Pain Onset 1: just now/from sleeping  Pain Location 1: Head  Pain Orientation 1: Anterior, Mid  Pain Description 1: Dull  Pain Intervention(s) 1: Food, Environmental changes, Position    PT/OT  Gait     Gait  Base of Support: Widened  Speed/Maira: Shuffled  Step Length: Right shortened, Left shortened  Gait Abnormalities: Decreased step clearance, Trunk sway increased  Ambulation - Level of Assistance: Minimal assistance  Distance (ft): 30 Feet (ft)  Assistive Device: Gait belt(HHA)        Vitals:    19 0600 19 0700 19 0800 19 0900   BP: 156/86 124/64 141/88 120/57   Pulse: 68 69 71 76   Resp: 15 15 16 18   Temp:   98.6 °F (37 °C)    SpO2: 96% 94% 94% 96%   Weight:       Height:            Physical Exam:  GENERAL: alert, cooperative, no distress, appears stated age  LUNG: clear to auscultation bilaterally  HEART: regular rate and rhythm, S1, S2 normal, no murmur, click, rub or gallop  EXTREMITIES:  extremities normal, atraumatic, no cyanosis, trace edema in bilateral hands. 1+ distal pulses bilaterally. SKIN: no rash or abnormalities. Skin warm to touch. Right groin site clean, dry, and intact. No hematoma, bruising, or bleeding at site. Neurologic Exam:  Mental Status:  Alert and oriented x 4. Appropriate affect, mood and behavior. Language:    Normal fluency. Able to name objects. Cranial Nerves:        Pupils equal, round and reactive to light. Visual fields full to confrontation. Extraocular movements intact. Facial sensation intact. Full facial strength, no asymmetry. Hearing grossly intact bilaterally. No dysarthria. Tongue protrudes to midline, palate elevates symmetrically. Motor:    No pronator drift. Bulk and tone normal.      5/5 power in all extremities proximally and distally. No involuntary movements. Sensation:    Sensation intact throughout to light touch. Coordination & Gait: No ataxia with FTN and HTS testing. Gait deferred.      24 hour results:    Recent Results (from the past 24 hour(s))   TCD INTRACRANIAL ARTERIES COMPLETE    Collection Time: 07/28/19 11:31 AM   Result Value Ref Range    Right [place]ard Ratio 1.3     Right MCA 1 PSV 72 cm/s    Right MCA 1 EDV 25 cm/s    Right MCA 1 Mean Velocity 41 cm/s    Right NGUYEN  cm/s    Right NGUYEN EDV 89 cm/s    Right NGYUEN Mean Velocity 131 cm/s    Right ICA  cm/s    Right ICA EDV 34 cm/s    Right ICA Mean Velocity 59 cm/s    Right PCA 1 PSV 96 cm/s    Right PCA 1 EDV 27 cm/s    Right PCA 1 Mean Velocity 50 cm/s    Right External ICA PSV 54 cm/s    Right External ICA EDV 20 cm/s    Right External ICA Mean Velocity 31 cm/s    Left MCA 1 PSV 76 cm/s    Left MCA 1 EDV 30 cm/s    Left MCA 1 Mean Velocity 45 cm/s    Left NGUYEN PSV 58 cm/s    Left NGUYEN EDV 19 cm/s    Left NGUYEN Mean Velocity 32 cm/s    Left PCA 1  cm/s    Left PCA 1 EDV 38 cm/s    Left PCA 1 Mean Velocity 65 cm/s    Basilar Artery PSV 49 cm/s    Basilar Artery EDV 15 cm/s    Basilar Artery Mean Mesfin 26 cm/s    Left Vertebral Mean Velocity 29 cm/s    Left Vertebral PSV 52 cm/s    Left Vertebral EDV 17 cm/s   GLUCOSE, POC    Collection Time: 07/28/19 12:27 PM   Result Value Ref Range    Glucose (POC) 177 (H) 65 - 100 mg/dL    Performed by Katarzyna MORRISON    GLUCOSE, POC    Collection Time: 07/28/19  6:09 PM   Result Value Ref Range    Glucose (POC) 97 65 - 100 mg/dL    Performed by Katarzyna MORRISON    GLUCOSE, POC    Collection Time: 07/28/19 10:02 PM   Result Value Ref Range    Glucose (POC) 128 (H) 65 - 100 mg/dL    Performed by Carlee Contreras    GLUCOSE, POC    Collection Time: 07/29/19  7:26 AM   Result Value Ref Range    Glucose (POC) 127 (H) 65 - 100 mg/dL    Performed by [address] [place] [address] Road:  CT of Head on 7/19/2019 shows  IMPRESSION:   Acute subarachnoid hemorrhage in the left sylvian fissure and basal cisterns. No  evidence for acute infarct. CTA of the Head and Neck on 7/19/2019 shows  IMPRESSION:  Severe atherosclerotic change in the cervical internal carotid artery. Greater  than 90% stenosis of the proximal left internal carotid artery.     Moderate stenosis right internal carotid artery approximately 50%.     No intracranial aneurysm. Minimal irregularity of M2 segments may be related to  mild vasospasm. Stable degree of subarachnoid hemorrhage when compared to the CT  of the head performed earlier in the day. CT of Head on 7/20/2019 shows  IMPRESSION:  1. Stable small to moderate volume subarachnoid hemorrhage as described above,  largest components in the suprasellar cistern, prepontine cistern, and left sylvian fissure. CT of Head on 7/22/2019 shows  IMPRESSION  IMPRESSION: Decreased subarachnoid hemorrhage. No new intracranial hemorrhage. Assessment:     Principal Problem:    SAH (subarachnoid hemorrhage) ([address] (7/20/2019)        Plan:   1.  SAH, Savage Mcdonald 1, Perez Grade 2, PBD day 14   - as seen on CT/CTA, no aneurysm seen on CTA   - neuro exam non-focal   - s/p cerebral angiogram on 7/20 which did not show an obvious aneurysm to explain blood pattern, 1 mm x 1 mm triangular outpouching on distal right NGUYEN trunk; ? Infundibulum, tiny aneurysm seen on angiogram, patient will need a repeat angiogram in one week. Repeat angio on 7/25 showed no evidence of aneurysm, AVM, or fistula. Minimal right NGUYEN A1 spasm. Occluded LICA in neck with reconstitution by vaso vasorum and ophthalmic. Left hemisphere fed by large caliber left PCOM. Intracranial athero. 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              - Continue Nimotop day 11 of 21 for prevention of delayed cerebral ischemia               - Continue Keppra 500 BID for seizure ppx              - Continue LR at 100 ml/hr to maintain euvolemia   - no TCD's needed today, TCD's yesterday showed no evidence of significant vasospasm              - Strict I&O's              - TTE shows EF 56-60%, moderate pulmonary HTN, trace mitral valve regurgitation, trace tricuspid valve regurgitation               - ok with neuro checks every 4 hours                - SBP goal 100-160, Hydralazine PRN              - PT/OT following- needs repeat eval today   - NIS following, Neurosurgery following as needed      2. Hx of HTN   - SBP goal 100-160   - Hydralazine PRN   - Hospitalist following    3. DM type 2   - sliding scale insulin and point of care glucose checks   - Management per hospitalist     4. Constipation --> improved    - patient reported having a few bowel movements     - continue Colace daily   - give Miralax as needed    - Continue to ambulate around unit    Activity: Up with assistance   DVT ppx: SCDs, Lovenox  Dispo: TBD, PT/OT recommending Home Health and Inpatient Rehab- needs repeat eval today. Discussed with case management on rehab needs to coordinate for discharge. Plan d/w Dr. Keyla Bravo, ICU RN, and patient.  Patient has NSTU orders in place. Ok to discharge from an NIS standpoint.         Niranjan Cervantes NP

## 2019-07-29 NOTE — PROGRESS NOTES
Bedside, Verbal and Written shift change report given to Cynthia (oncoming nurse) by [place] (offgoing nurse). Report included the following information Kardex. 1200- Report given to SOURAV LAWRENCE.

## 2019-07-29 NOTE — PROGRESS NOTES
Transitions of care    1: Home with [facility] for Physical therapy  2:  will transport home  3: referral made to Care management specialist for follow up appointment    Jason Randall RN,CRM

## 2019-07-29 NOTE — ROUTINE PROCESS
PCP KEHINDE apt scheduled with Dr. Lauren Vallejo on 8/6/19 at 11:00AM.  [facility] visit scheduled for 8/2/19 at [address] will contact patient for additional information and to confirm visit.   Apts added to AVS

## 2019-07-29 NOTE — PROGRESS NOTES
Shift summary:  Pt has remained neurologically stable throughout night. Pt in NSR with some PAC's early in shift. Went into a-fib / flutter ~ 0330, without hemodynamic effect.

## 2019-07-29 NOTE — ROUTINE PROCESS
0000. Bedside and Verbal shift change report given to [address] (oncoming nurse) by Gretel Montse (offgoing nurse). Report included the following information SBAR, Kardex, ED Summary, OR Summary, Procedure Summary, Intake/Output, MAR, Accordion, Recent Results, Cardiac Rhythm nsr and Alarm Parameters . 0800. Bedside and Verbal shift change report given to Cole Valle  (oncoming nurse) by [address] (offgoing nurse). Report included the following information SBAR, Kardex, ED Summary, Procedure Summary, Intake/Output, MAR, Accordion, Cardiac Rhythm a-fib/flutter <-> nsr and Alarm Parameters .

## 2019-07-29 NOTE — PROGRESS NOTES
[facility] Transitional Care Team: Discharge CHRISTIANO Note    Date of Assessment: 07/29/19  Time of Assessment:  4:33 PM    Assessment & Plan   KEHINDE Diagnoses:  Acute subarachnoid hemorrhage  -S/P cerebral angiogram, no obvious aneurysm   - Per neurointerventionalist--keep SBP between 110 and 180   - Continue Nimodipine  - Continue Keppra   - Angio on 7/25/19 showed no aneurysm  Right Transcranial Doppler   The MCA and PCA are patent and without evidence of vasospasm. The terminal ICA was not clearly visualized. NGUYEN velocity appears significantly elevated and suggests vasospasm. The vertebral was not seen. Left Transcranial Doppler    The MCA appears patent and without evidence of vasospasm. The terminal ICA and NGUYEN are visualized. Noted left distal extracranial ICA occlusion as previously reported on angiography. The basilar and vertebral appear patent and without vasospasm on today's exam. The PCA remains significantly elevated and suggests possible vasospasm vs hyperemia. Acute kidney injury, stable     Diabetes mellitus type 2, with hyperglycemia       Accelerated hypertension. BP stable     Afib:   - BB, no anticoagulation      Readmission Risks: low    1. Has support at home  2. Recovering well from [facility]  3. BP within parameters  4. Agreeable to completing course of nimodipine for vasospasm prevention      Nurse Navigator: yes, CHANCE Teran appointment with Curtis Seaman on 8/2 and with Dr. Cole Watkins on 8/6    Code status: Full  Recommended Disposition: [id number] Ty[address] Sung PT         Number of Admissions this year:  only current admission    Heart Failure Bundle:  no    Met Mrs. Sammy Peralta today and introduced myself and the [facility] and its goals. Also, spoke to her  by telephone to do the same. Advance Care Plan: not on file and met her today without  present    Medication reconciliation was performed by [facility] NP. [facility] to ask if nimodipine is available and it is.   Pharmacy staff shared that this will cost approx. $325 for the remainder of doses due for 12-day course. This is less expensive than through [facility] discount card or through her current insurance carrier. Her  is aware and that this medication will be stopped outpatient after 11 more days. Discharge Needs: will have [facility] come to her home on Friday for transitional visit. Has appts next week with her PCP.  made aware that she needs F/U appt scheduled with cardiology in 2 weeks and neurointerventionalist n 2-3 weeks. Will have [facility] Dayanara NP left patient with [facility] calender/follow up appointments/Ambulatory Nurse Navigation information at discharge. [facility] information given to patient with explanation of services to  by phone. Patient defers to  for decision-making at this time, and he has agreed to have them come out Friday 8/2 for transitional care visit. Patient/family education focused on readmission zones as described as: The Red Zone: High risk for readmission, days 1-21                          The Yellow Zone: Moderate risk for readmission, days 22-29                          The Green Zone: Lower risk for readmission, days 30 and after    Mandy Pearson is a 80 y.o. female inpatient at [facility] admitted with headache, right sided-weakness, and right upper extremity drift on 7/19/19. Chart reviewed by Andres Linares NP and [facility] Understanding of Goals) program introduced to patient/family. The Transitional Care Team bridges the gaps in care and education surrounding discharge from the acute care facility. The objective is to empower the patient and family in taking a proactive role in the task of preventing readmission within the first thirty days after discharge from the acute care setting.  The team is also involved in the efforts to reduce readmission to the acute care setting after stabilization and discharge from the acute care environment either to the skilled nursing facilities or community. The TC Team will follow patient from a distance while inpatient as well as be available for further transition disposition as needed. The KEHINDE TEAM will continue to offer support during the 30- 90 day discharge from acute care setting. Notified Ambulatory KEHINDE Nurse Navigator, Mahesh Allen LPN, that patient discharged home today. Past Medical History:   Diagnosis Date    [facility]F [facility]acute [facility] failure) [facility]     2/11/13 - had a viral gastroenteritis     Diabetes [facility]     Duodenal ulcer disease     EGD 2/13 - aspirin stopped afterwards    Hypertension     Leg edema     PAF (paroxysmal atrial fibrillation) ([facility])     1 hour epsiode 2/11/13       No flowsheet data found.

## 2019-07-29 NOTE — DISCHARGE SUMMARY
Hospitalist Discharge Summary     Patient ID:  Keila Hill  [id number]  80 y.o.  1950  7/19/2019    PCP on record: Leanne AldenDO    Admit date: 7/19/2019  Discharge date and time: 7/29/2019    DISCHARGE DIAGNOSIS:    SAH  DM    CONSULTATIONS:  IP CONSULT TO INTENSIVIST  IP CONSULT TO INTENSIVIST  IP CONSULT TO CARDIOLOGY    Excerpted HPI from H&P of Giorgi Bah MD:  HISTORY OF PRESENT ILLNESS:  The patient is a 30-year-old female with past medical history of hypertension, diabetes mellitus type 2, history of acute renal failure, atrial fibrillation, who presents to the hospital with the above-mentioned symptoms. History was primarily obtained from the patient. The patient reports that for the last one week or so, she has been sick. The patient reports she started having some \"food poisoning\" associated with diarrhea, nausea, vomiting, poor appetite; went to [facility] about 4 days back; was given some IV fluids; and discharged home. The patient reports that she continued to feel weak. Reports that yesterday, she went to her primary care physician, was told that she has UTI, and was prescribed antibiotics. The  reports that he came in today around noon to have lunch with his wife, found the patient lying on the couch, got her up to get to the bathroom, but \"she became limp and fell on the ground. \"  The patient also reports that she had a second fall today around 2 hours ago prior to coming over here. The  reports that she was very weak and he is not sure whether she passed out or not.   The patient came to the ER as a code stroke because she had right-sided weakness and had some positive pronator drift and unequal  strength and was brought to the hospital.  The patient had a CT scan done, which showed acute subarachnoid hemorrhage in the left sylvian fissure and basal cistern and the patient was transferred to [facility]. Currently, the patient is resting in bed. Reports that when she fell today, she bruised her right shoulder and is hurting over there. Besides that, denies any complaints or problems. The patient denies any headache. Denies blurry vision, sore throat, trouble swallowing, trouble with speech, any shortness of breath, cough, fever, chills, abdominal pain, constipation, diarrhea, urinary symptoms, focal or generalized neurological weakness, recent travel, sick contacts, any hematemesis, melena, hemoptysis, hematuria, or any other concerns or problems. The patient reports that she does not take any anticoagulation on a daily basis    ______________________________________________________________________  DISCHARGE SUMMARY/HOSPITAL COURSE:  for full details see H&P, daily progress notes, labs, consult notes. Assessment / Plan:  1.  Acute subarachnoid hemorrhage,  stable, plan for cerebral angiography on 7/25  -S/P cerebral angiogram, no obvious aneurysm   - Optimize BP control, currently on cardene gtt, keep SBP less than 140   - Continue Nimodipine  - continue keppra   - PT eval pending  - CT head with \" Stable small to moderate volume subarachnoid hemorrhage \"  - neurochecks, seizure precaution  - appreciate NS and BERRY input  CT shows stable SAH    angio on 7/25/19 shows no aneurysm, continue close monitoring, keep BP below 180 mmHg  TCD: shows Right Transcranial Doppler   The MCA and PCA are patent and without evidence of Vasospasm. The terminal ICA was not clearly visualized. NGUYEN velocity appears significantly elevated and suggests vasospasm. The vertebral was not seen. Left Transcranial Doppler      The MCA appears patent and without evidence of vasospasm. The terminal ICA and NGUYEN are visualized. Noted left distal extracranial ICA occlusion as previously reported on angiography.  The basilar and vertebral appear patent and without vasospasm on today's exam. The PCA remains significantly elevated and suggests possible vasospasm vs hyperemia.  `  2.  Acute kidney injury,stable   - likely prerenal due to recent GI loss  - improving  - continue IV hydration  - avoid nephrotoxic meds     3.  Diabetes mellitus type 2,with hyperglycemia ; still hyperglycemic    - better controlled  - continue SSNI, accuchecks, monitor   Resume lower dose of Lantus, she is eating better, required SSI,  Increased Lantus 18 BID         4.  Accelerated hypertension. BP stable   -tight BP control to keep SBP less than 140  -  Cardene gtt off   - monitor   - 5. Afib: BB no anticoagulation,   Code status: full  DVT prophylaxis: SCD           _______________________________________________________________________  Patient seen and examined by me on discharge day. Pertinent Findings:  Gen:    Not in distress  Chest: Clear lungs  CVS:   Regular rhythm. No edema  Abd:  Soft, not distended, not tender  Neuro:  Alert, oriented x3  _______________________________________________________________________  DISCHARGE MEDICATIONS:   Current Discharge Medication List      START taking these medications    Details   aspirin 81 mg chewable tablet Take 1 Tab by mouth daily. Qty: 30 Tab, Refills: 1      docusate sodium (COLACE) 100 mg capsule Take 1 Cap by mouth daily for 90 days. Qty: 30 Cap, Refills: 2      niMODipine (NIMOTOP) 30 mg capsule Take 2 Caps by mouth every four (4) hours for 11 days. Qty: 132 Cap, Refills: 0      levETIRAcetam (KEPPRA) 500 mg tablet Take 1 Tab by mouth every twelve (12) hours. Qty: 60 Tab, Refills: 0      insulin glargine (LANTUS,BASAGLAR) 100 unit/mL (3 mL) inpn 15 Units by SubCUTAneous route two (2) times a day. Qty: 3 Pen, Refills: 1         CONTINUE these medications which have CHANGED    Details   metoprolol tartrate (LOPRESSOR) 25 mg tablet Take 0.5 Tabs by mouth two (2) times a day.   Qty: 60 Tab, Refills: 2    Comments: NEEDS [facility]         CONTINUE these medications which have NOT CHANGED    Details   rosuvastatin (CRESTOR) 40 mg tablet Take 40 mg by mouth nightly. olmesartan-hydroCHLOROthiazide (BENICAR HCT) 40-25 mg per tablet Take 1 Tab by mouth daily. ondansetron (ZOFRAN ODT) 4 mg disintegrating tablet Take 1 Tab by mouth every eight (8) hours as needed for Nausea. Qty: 20 Tab, Refills: 0      insulin lispro (HUMALOG) 100 unit/mL injection by SubCUTAneous route. OTHER mycardis hct      pantoprazole (PROTONIX) 40 mg tablet Take 1 Tab by mouth daily. Qty: 30 Tab, Refills: 0      omega-3 fatty acids-vitamin e (FISH OIL) 1,000 mg cap Take 1 Cap by mouth. Calcium-Cholecalciferol, D3, (CALCIUM 600 WITH VITAMIN D3) 600 mg(1,500mg) -400 unit cap Take  by mouth. STOP taking these medications       ciprofloxacin HCl (CIPRO) 250 mg tablet Comments:   Reason for Stopping:         insulin NPH (NOVOLIN, HUMULIN) 100 unit/mL injection Comments:   Reason for Stopping:         fexofenadine (ALLEGRA) 180 mg tablet Comments:   Reason for Stopping:         telmisartan-hydrochlorothiazide (MICARDIS HCT) 80-25 mg per tablet Comments:   Reason for Stopping:         atorvastatin (LIPITOR) 40 mg tablet Comments:   Reason for Stopping:                 Patient Follow Up Instructions:    Activity: Activity as tolerated  Diet: Diabetic Diet  Wound Care: None needed    Follow-up with Neuro interventional surgeyr in 2 weeks    Follow-up tests/labs none pending   Follow-up Information     Follow up With Specialties Details Why Contact Info    Rachel Franco MD Cardiology Schedule an appointment as soon as possible for a visit in 2 weeks Atrial fibrillation management [address] [place] [address] 13  [phone]      Claudy Montano MD    Patient can only remember the practice name and not the physician      Saul Lacy DO [facility] Go on 8/6/2019 For hospital follow up appointment at 11:00AM [address] Services   [facility] [phone]   [address], Renée Frausto MD Endovascular Surgical Neuroradiology In 2 weeks F/U after hospitalization, in [facility] on [address] [place] [address] Avenue  [phone]          ________________________________________________________________    Risk of deterioration: Moderate    Condition at Discharge:  Stable  __________________________________________________________________    Disposition  Home with family and home health services    ____________________________________________________________________    Code Status: Full Code  ___________________________________________________________________      Total time in minutes spent coordinating this discharge (includes going over instructions, follow-up, prescriptions, and preparing report for sign off to her PCP) :  35 minutes    Signed:  Regina Medina MD

## 2019-07-29 NOTE — PROGRESS NOTES
Patient requiring home health PT/OT for rehab needs upon discharge after discussing with case management. She is day of 11 of 21 for her Nimotop today and will need prescription for remaining doses upon discharge. She should be able to use [facility] pharmacy for Nimotop script. She needs to follow up in [facility] in 2-3 weeks upon discharge.  I have discussed this with Dr. Carole Crowell Attending [facility]  Neurointerventional Surgery   Neurocritical care NP

## 2019-07-30 ENCOUNTER — HOME CARE VISIT (OUTPATIENT)
Dept: SCHEDULING | Facility: HOME HEALTH | Age: 69
End: 2019-07-30
Payer: COMMERCIAL

## 2019-07-30 VITALS
SYSTOLIC BLOOD PRESSURE: 100 MMHG | OXYGEN SATURATION: 96 % | WEIGHT: 196 LBS | HEART RATE: 69 BPM | BODY MASS INDEX: 33.46 KG/M2 | DIASTOLIC BLOOD PRESSURE: 68 MMHG | TEMPERATURE: 98.1 F | HEIGHT: 64 IN

## 2019-07-30 PROCEDURE — G0151 HHCP-SERV OF PT,EA 15 MIN: HCPCS

## 2019-07-30 PROCEDURE — 400013 HH SOC

## 2019-08-01 ENCOUNTER — HOME CARE VISIT (OUTPATIENT)
Dept: SCHEDULING | Facility: HOME HEALTH | Age: 69
End: 2019-08-01
Payer: COMMERCIAL

## 2019-08-01 VITALS
TEMPERATURE: 98.1 F | HEART RATE: 76 BPM | SYSTOLIC BLOOD PRESSURE: 118 MMHG | DIASTOLIC BLOOD PRESSURE: 80 MMHG | OXYGEN SATURATION: 97 % | RESPIRATION RATE: 26 BRPM

## 2019-08-01 PROCEDURE — G0151 HHCP-SERV OF PT,EA 15 MIN: HCPCS

## 2019-08-02 ENCOUNTER — HOME CARE VISIT (OUTPATIENT)
Dept: SCHEDULING | Facility: HOME HEALTH | Age: 69
End: 2019-08-02
Payer: COMMERCIAL

## 2019-08-02 VITALS
SYSTOLIC BLOOD PRESSURE: 98 MMHG | HEART RATE: 67 BPM | OXYGEN SATURATION: 97 % | DIASTOLIC BLOOD PRESSURE: 60 MMHG | TEMPERATURE: 98.7 F

## 2019-08-02 PROCEDURE — G0151 HHCP-SERV OF PT,EA 15 MIN: HCPCS

## 2019-08-05 ENCOUNTER — HOME CARE VISIT (OUTPATIENT)
Dept: SCHEDULING | Facility: HOME HEALTH | Age: 69
End: 2019-08-05
Payer: COMMERCIAL

## 2019-08-05 VITALS
HEART RATE: 57 BPM | DIASTOLIC BLOOD PRESSURE: 70 MMHG | SYSTOLIC BLOOD PRESSURE: 132 MMHG | TEMPERATURE: 97.8 F | OXYGEN SATURATION: 90 %

## 2019-08-05 PROCEDURE — G0151 HHCP-SERV OF PT,EA 15 MIN: HCPCS

## 2019-08-08 ENCOUNTER — HOME CARE VISIT (OUTPATIENT)
Dept: SCHEDULING | Facility: HOME HEALTH | Age: 69
End: 2019-08-08
Payer: COMMERCIAL

## 2019-08-08 PROCEDURE — G0151 HHCP-SERV OF PT,EA 15 MIN: HCPCS

## 2019-08-09 ENCOUNTER — HOME CARE VISIT (OUTPATIENT)
Dept: SCHEDULING | Facility: HOME HEALTH | Age: 69
End: 2019-08-09
Payer: COMMERCIAL

## 2019-08-09 VITALS
OXYGEN SATURATION: 83 % | DIASTOLIC BLOOD PRESSURE: 54 MMHG | TEMPERATURE: 97.5 F | HEART RATE: 68 BPM | SYSTOLIC BLOOD PRESSURE: 102 MMHG

## 2019-08-09 PROCEDURE — G0151 HHCP-SERV OF PT,EA 15 MIN: HCPCS

## 2019-08-10 VITALS
SYSTOLIC BLOOD PRESSURE: 106 MMHG | TEMPERATURE: 97.2 F | DIASTOLIC BLOOD PRESSURE: 60 MMHG | OXYGEN SATURATION: 96 % | HEART RATE: 72 BPM

## 2019-08-12 ENCOUNTER — HOME CARE VISIT (OUTPATIENT)
Dept: SCHEDULING | Facility: HOME HEALTH | Age: 69
End: 2019-08-12
Payer: COMMERCIAL

## 2019-08-12 VITALS — SYSTOLIC BLOOD PRESSURE: 82 MMHG | TEMPERATURE: 97.2 F | DIASTOLIC BLOOD PRESSURE: 50 MMHG

## 2019-08-12 PROCEDURE — G0151 HHCP-SERV OF PT,EA 15 MIN: HCPCS

## 2019-08-15 ENCOUNTER — HOME CARE VISIT (OUTPATIENT)
Dept: SCHEDULING | Facility: HOME HEALTH | Age: 69
End: 2019-08-15
Payer: COMMERCIAL

## 2019-08-15 VITALS
DIASTOLIC BLOOD PRESSURE: 62 MMHG | HEART RATE: 112 BPM | TEMPERATURE: 98.1 F | OXYGEN SATURATION: 96 % | SYSTOLIC BLOOD PRESSURE: 100 MMHG

## 2019-08-15 PROCEDURE — G0151 HHCP-SERV OF PT,EA 15 MIN: HCPCS

## 2019-08-16 ENCOUNTER — HOME CARE VISIT (OUTPATIENT)
Dept: SCHEDULING | Facility: HOME HEALTH | Age: 69
End: 2019-08-16
Payer: COMMERCIAL

## 2019-08-16 VITALS
DIASTOLIC BLOOD PRESSURE: 62 MMHG | SYSTOLIC BLOOD PRESSURE: 112 MMHG | HEART RATE: 74 BPM | OXYGEN SATURATION: 96 % | TEMPERATURE: 97.2 F

## 2019-08-16 PROCEDURE — G0151 HHCP-SERV OF PT,EA 15 MIN: HCPCS

## 2019-08-19 ENCOUNTER — HOME CARE VISIT (OUTPATIENT)
Dept: SCHEDULING | Facility: HOME HEALTH | Age: 69
End: 2019-08-19
Payer: COMMERCIAL

## 2019-08-19 VITALS
SYSTOLIC BLOOD PRESSURE: 99 MMHG | DIASTOLIC BLOOD PRESSURE: 79 MMHG | HEART RATE: 87 BPM | OXYGEN SATURATION: 89 % | TEMPERATURE: 97.2 F

## 2019-08-19 PROCEDURE — G0151 HHCP-SERV OF PT,EA 15 MIN: HCPCS

## 2019-08-21 ENCOUNTER — HOME CARE VISIT (OUTPATIENT)
Dept: SCHEDULING | Facility: HOME HEALTH | Age: 69
End: 2019-08-21
Payer: COMMERCIAL

## 2019-08-21 VITALS — DIASTOLIC BLOOD PRESSURE: 68 MMHG | TEMPERATURE: 97.5 F | HEART RATE: 70 BPM | SYSTOLIC BLOOD PRESSURE: 122 MMHG

## 2019-08-21 PROCEDURE — G0151 HHCP-SERV OF PT,EA 15 MIN: HCPCS

## 2019-08-22 ENCOUNTER — HOME CARE VISIT (OUTPATIENT)
Dept: SCHEDULING | Facility: HOME HEALTH | Age: 69
End: 2019-08-22
Payer: COMMERCIAL

## 2019-08-22 VITALS — SYSTOLIC BLOOD PRESSURE: 118 MMHG | DIASTOLIC BLOOD PRESSURE: 68 MMHG | TEMPERATURE: 98.7 F | OXYGEN SATURATION: 94 %

## 2019-08-22 PROCEDURE — G0151 HHCP-SERV OF PT,EA 15 MIN: HCPCS

## 2019-08-26 ENCOUNTER — HOME CARE VISIT (OUTPATIENT)
Dept: SCHEDULING | Facility: HOME HEALTH | Age: 69
End: 2019-08-26
Payer: COMMERCIAL

## 2019-08-26 PROCEDURE — G0151 HHCP-SERV OF PT,EA 15 MIN: HCPCS

## 2019-08-27 VITALS
DIASTOLIC BLOOD PRESSURE: 70 MMHG | OXYGEN SATURATION: 87 % | SYSTOLIC BLOOD PRESSURE: 132 MMHG | HEART RATE: 102 BPM | TEMPERATURE: 97.5 F

## 2019-08-28 ENCOUNTER — HOME CARE VISIT (OUTPATIENT)
Dept: HOME HEALTH SERVICES | Facility: HOME HEALTH | Age: 69
End: 2019-08-28
Payer: COMMERCIAL

## 2019-08-30 ENCOUNTER — HOME CARE VISIT (OUTPATIENT)
Dept: SCHEDULING | Facility: HOME HEALTH | Age: 69
End: 2019-08-30
Payer: COMMERCIAL

## 2019-08-30 VITALS
SYSTOLIC BLOOD PRESSURE: 148 MMHG | OXYGEN SATURATION: 86 % | HEART RATE: 71 BPM | TEMPERATURE: 98.8 F | DIASTOLIC BLOOD PRESSURE: 78 MMHG

## 2019-08-30 PROCEDURE — G0151 HHCP-SERV OF PT,EA 15 MIN: HCPCS

## 2019-09-03 ENCOUNTER — HOME CARE VISIT (OUTPATIENT)
Dept: SCHEDULING | Facility: HOME HEALTH | Age: 69
End: 2019-09-03
Payer: COMMERCIAL

## 2019-09-03 VITALS — DIASTOLIC BLOOD PRESSURE: 68 MMHG | SYSTOLIC BLOOD PRESSURE: 118 MMHG | OXYGEN SATURATION: 91 % | HEART RATE: 72 BPM

## 2019-09-03 PROCEDURE — G0151 HHCP-SERV OF PT,EA 15 MIN: HCPCS

## 2019-09-04 ENCOUNTER — HOME CARE VISIT (OUTPATIENT)
Dept: SCHEDULING | Facility: HOME HEALTH | Age: 69
End: 2019-09-04
Payer: COMMERCIAL

## 2019-09-04 PROCEDURE — G0151 HHCP-SERV OF PT,EA 15 MIN: HCPCS

## 2019-09-06 ENCOUNTER — HOME CARE VISIT (OUTPATIENT)
Dept: SCHEDULING | Facility: HOME HEALTH | Age: 69
End: 2019-09-06
Payer: COMMERCIAL

## 2019-09-06 PROCEDURE — G0151 HHCP-SERV OF PT,EA 15 MIN: HCPCS

## 2019-09-07 VITALS
TEMPERATURE: 98.7 F | OXYGEN SATURATION: 85 % | SYSTOLIC BLOOD PRESSURE: 122 MMHG | HEART RATE: 71 BPM | DIASTOLIC BLOOD PRESSURE: 68 MMHG

## 2019-09-30 ENCOUNTER — OFFICE VISIT (OUTPATIENT)
Dept: NEUROSURGERY | Age: 69
End: 2019-09-30

## 2019-09-30 ENCOUNTER — HOSPITAL ENCOUNTER (OUTPATIENT)
Dept: LAB | Age: 69
Discharge: HOME | End: 2019-09-30
Payer: COMMERCIAL

## 2019-09-30 VITALS
SYSTOLIC BLOOD PRESSURE: 130 MMHG | DIASTOLIC BLOOD PRESSURE: 78 MMHG | RESPIRATION RATE: 17 BRPM | OXYGEN SATURATION: 96 % | HEART RATE: 65 BPM | HEIGHT: 64 IN | TEMPERATURE: 98.2 F | WEIGHT: 190 LBS | BODY MASS INDEX: 32.44 KG/M2

## 2019-09-30 DIAGNOSIS — I60.8 NON-ANEURYSMAL PERIMESENCEPHALIC SUBARACHNOID HEMORRHAGE (HCC): Primary | ICD-10-CM

## 2019-09-30 DIAGNOSIS — I65.22 INTERNAL CAROTID ARTERY STENOSIS, LEFT: ICD-10-CM

## 2019-09-30 LAB — ASPIRIN TEST, ASPIRN: 367 ARU

## 2019-09-30 PROCEDURE — 85576 BLOOD PLATELET AGGREGATION: CPT

## 2019-09-30 RX ORDER — HYDROCHLOROTHIAZIDE 12.5 MG/1
12.5 CAPSULE ORAL DAILY
Refills: 1 | COMMUNITY
Start: 2019-09-20

## 2019-09-30 NOTE — PROGRESS NOTES
Neurointerventional Surgery  Ambulatory Progress Note      Patient: Ktahy Pollack MRN: [id number]  SSN: [id number]    YOB: 1950  Age: 80 y.o. Sex: female      History of Present Illness:      Kathy Pollack is a 80 y.o. female who presented to [facility] ED on due to complaints of headache and weakness. Pt reports on 7/14/19 she was vomiting, reports the headache started after the vomiting the next day. She tylenol for her headache with some relief. She reports a hx of minor headache, but that this headache was worse than usual for her. Pt reported weakness x 2 days, reports today the weakness was worse on her way to the bathroom causing a GLF without LOC. Pt is a type I diabetic, reports she checks her BG and is compliant with insulin therapy. In the ED, a stat head CT was performed which showed SAH. 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 She was transferred to [facility] for higher level of care. She underwent diagnostic cerebral angiogram by Dr. Dilip Stapleton on 7/20/19, which was negative for aneurysm or AVM. She then underwent another diagnostic cerebral angiogram by Dr. Kristian Khan 7/25/19, again was negative for aneurysm or AVM but does show 1 mm x 1 mm triangular outpouching on distal right NGUYEN trunk; ? Infundibulum, tiny aneurysm and a right PComA infundibulum with vessel arising eccentrically from the dome. Incidentally, an occluded left ICA at bifurcation with reconstitution of the cervical ICA via vasovasorum was noted with remainder of the cervical ICA diminutive, but fills the ophthalmic artery and is extremely stenotic in the supraclinoid segment with only minimal filling of the MCA trunk      Today, Mrs. Brittany Medina reports she is feeling well. She report she has received 6 weeks of PT and has recovered most of her strength and mobility, although she does now ambulate with a cane to prevent falls.  She was discharged from the hospital on [address], reports she stopped taking this med when her refills ran out. She denies any headache, neck pain or stiffness, any numbness or tingling, dizziness or syncope. Patient Active Problem List   Diagnosis Code    Acute renal failure (ARF) ([facility] N17.9    Hypotension, unspecified I95.9    Nausea & vomiting R11.2    Diarrhea R19.7    Unspecified essential hypertension I10    [facility] mellitus [facility] 1 ([facility] E10.9    Leg edema R60.0    Duodenal ulcer disease K26.9    ARF (acute renal failure) ([facility] N17.9    SAH (subarachnoid hemorrhage) ([facility]) I60.9     Current Outpatient Medications   Medication Sig Dispense Refill    hydroCHLOROthiazide (MICROZIDE) 12.5 mg capsule Take 12.5 mg by mouth daily. 1    insulin regular (NOVOLIN R, HUMULIN R) 100 unit/mL injection 7 Units by SubCUTAneous route three (3) times daily (with meals). 3 times a day and  Sliding scale  0-200 no additional   201-250- 5 units  251-300- 7 units  301-and more 10 units      co-enzyme Q-10 (COQ-10) 100 mg capsule Take 100 mg by mouth daily.  aspirin 81 mg chewable tablet Take 1 Tab by mouth daily. 30 Tab 1    metoprolol tartrate (LOPRESSOR) 25 mg tablet Take 0.5 Tabs by mouth two (2) times a day. 60 Tab 2    rosuvastatin (CRESTOR) 40 mg tablet Take 40 mg by mouth nightly.  insulin lispro (HUMALOG) 100 unit/mL injection by SubCUTAneous route.  pantoprazole (PROTONIX) 40 mg tablet Take 1 Tab by mouth daily. 30 Tab 0    omega-3 fatty acids-vitamin e (FISH OIL) 1,000 mg cap Take 1 Cap by mouth.  Calcium-Cholecalciferol, D3, (CALCIUM 600 WITH VITAMIN D3) 600 mg(1,500mg) -400 unit cap Take 1 Cap by mouth daily. one capsule daily      omega-3 fatty acids-fish oil 360-1,200 mg cap Take 1 Cap by mouth daily.  docusate sodium (COLACE) 100 mg capsule Take 1 Cap by mouth daily for 90 days. 30 Cap 2    levETIRAcetam (KEPPRA) 500 mg tablet Take 1 Tab by mouth every twelve (12) hours.  60 Tab 0    insulin glargine (LANTUS,BASAGLAR) 100 unit/mL (3 mL) inpn 15 Units by SubCUTAneous route two (2) times a day. 3 Pen 1    olmesartan-hydroCHLOROthiazide (BENICAR HCT) 40-25 mg per tablet Take 1 Tab by mouth daily.  ondansetron (ZOFRAN ODT) 4 mg disintegrating tablet Take 1 Tab by mouth every eight (8) hours as needed for Nausea. 20 Tab 0    OTHER mycardis hct       No Known Allergies  Past Medical History:   Diagnosis Date    [facility]F (acute renal failure) ([facility]     2/11/13 - had a viral gastroenteritis     Atrial fibrillation ([facility]     Diabetes ([facility]     Duodenal ulcer disease     EGD 2/13 - aspirin stopped afterwards    Falls fatigue    Fatigue     Hypertension     Leg edema     Leg swelling     Muscle weakness     PAF [facility]oxysmal [facility] fibrillation) ([facility])     1 hour epsiode 2/11/13     Past Surgical History:   Procedure Laterality Date    HX OTHER SURGICAL      Echo 2/11/13 - mild LVH, EF 60%     Family History   Problem Relation Age of Onset    Other Other         patient does not know    Coronary Artery Disease Father     Heart Disease Father     Heart Disease Mother     Stroke Maternal Aunt      Social History     Tobacco Use    Smoking status: Never Smoker    Smokeless tobacco: Never Used   Substance Use Topics    Alcohol use: No        Review of Systems    A comprehensive ROS was performed and was negative except for as per HPI. Objective:     Current Outpatient Medications   Medication Sig Dispense Refill    insulin regular (NOVOLIN R, HUMULIN R) 100 unit/mL injection 7 Units by SubCUTAneous route three (3) times daily (with meals). 3 times a day and  Sliding scale  0-200 no additional   201-250- 5 units  251-300- 7 units  301-and more 10 units      omega-3 fatty acids-fish oil 360-1,200 mg cap Take 1 Cap by mouth daily.  co-enzyme Q-10 (COQ-10) 100 mg capsule Take 100 mg by mouth daily.  aspirin 81 mg chewable tablet Take 1 Tab by mouth daily.  30 Tab 1    docusate sodium (COLACE) 100 mg capsule Take 1 Cap by mouth daily for 90 days. 30 Cap 2    levETIRAcetam (KEPPRA) 500 mg tablet Take 1 Tab by mouth every twelve (12) hours. 60 Tab 0    insulin glargine (LANTUS,BASAGLAR) 100 unit/mL (3 mL) inpn 15 Units by SubCUTAneous route two (2) times a day. 3 Pen 1    metoprolol tartrate (LOPRESSOR) 25 mg tablet Take 0.5 Tabs by mouth two (2) times a day. 60 Tab 2    rosuvastatin (CRESTOR) 40 mg tablet Take 40 mg by mouth nightly.  olmesartan-hydroCHLOROthiazide (BENICAR HCT) 40-25 mg per tablet Take 1 Tab by mouth daily.  ondansetron (ZOFRAN ODT) 4 mg disintegrating tablet Take 1 Tab by mouth every eight (8) hours as needed for Nausea. 20 Tab 0    insulin lispro (HUMALOG) 100 unit/mL injection by SubCUTAneous route.  OTHER mycardis hct      pantoprazole (PROTONIX) 40 mg tablet Take 1 Tab by mouth daily. 30 Tab 0    omega-3 fatty acids-vitamin e (FISH OIL) 1,000 mg cap Take 1 Cap by mouth.  Calcium-Cholecalciferol, D3, (CALCIUM 600 WITH VITAMIN D3) 600 mg(1,500mg) -400 unit cap Take 1 Cap by mouth daily. one capsule daily        There were no vitals taken for this visit. No Known Allergies    Current Outpatient Medications   Medication Sig    insulin regular (NOVOLIN R, HUMULIN R) 100 unit/mL injection 7 Units by SubCUTAneous route three (3) times daily (with meals). 3 times a day and  Sliding scale  0-200 no additional   201-250- 5 units  251-300- 7 units  301-and more 10 units    omega-3 fatty acids-fish oil 360-1,200 mg cap Take 1 Cap by mouth daily.  co-enzyme Q-10 (COQ-10) 100 mg capsule Take 100 mg by mouth daily.  aspirin 81 mg chewable tablet Take 1 Tab by mouth daily.  docusate sodium (COLACE) 100 mg capsule Take 1 Cap by mouth daily for 90 days.  levETIRAcetam (KEPPRA) 500 mg tablet Take 1 Tab by mouth every twelve (12) hours.  insulin glargine (LANTUS,BASAGLAR) 100 unit/mL (3 mL) inpn 15 Units by SubCUTAneous route two (2) times a day.     metoprolol tartrate (LOPRESSOR) 25 mg tablet Take 0.5 Tabs by mouth two (2) times a day.  rosuvastatin (CRESTOR) 40 mg tablet Take 40 mg by mouth nightly.  olmesartan-hydroCHLOROthiazide (BENICAR HCT) 40-25 mg per tablet Take 1 Tab by mouth daily.  ondansetron (ZOFRAN ODT) 4 mg disintegrating tablet Take 1 Tab by mouth every eight (8) hours as needed for Nausea.  insulin lispro (HUMALOG) 100 unit/mL injection by SubCUTAneous route.  OTHER mycardis hct    pantoprazole (PROTONIX) 40 mg tablet Take 1 Tab by mouth daily.  omega-3 fatty acids-vitamin e (FISH OIL) 1,000 mg cap Take 1 Cap by mouth.  Calcium-Cholecalciferol, D3, (CALCIUM 600 WITH VITAMIN D3) 600 mg(1,500mg) -400 unit cap Take 1 Cap by mouth daily. one capsule daily     No current facility-administered medications for this visit. Physical Exam:  General: NAD, calm, cooperative  female  Extremities: extremities normal, atraumatic, no cyanosis or edema  Pulses: 2+ and symmetric    Neurologic Exam:  Mental Status:  Alert and oriented x 4. Appropriate affect, mood and behavior. Language:    Normal fluency, repetition, comprehension and naming. Cranial Nerves:   Pupils equal, round and reactive to light. Visual fields full to confrontation. Extraocular movements intact. Facial sensation intact V1 - V3. Full facial strength, no asymmetry. Hearing intact bilaterally. No dysarthria. Tongue protrudes to midline, palate elevates symmetrically. Shoulder shrug 5/5 bilaterally. Motor:    No pronator drift. Bulk and tone normal.      5/5 power in all extremities proximally and distally. No involuntary movements. Sensation:    Sensation intact throughout to light touch. Coordination & Gait: Normal, tandem gait normal, FTN and HTS intact.       Labs:  Lab Results   Component Value Date/Time    Sodium 142 07/28/2019 06:12 AM    Potassium 4.0 07/28/2019 06:12 AM    Chloride 106 07/28/2019 06:12 AM    CO2 31 07/28/2019 06:12 AM    Anion gap 5 07/28/2019 06:12 AM    Glucose 101 (H) 07/28/2019 06:12 AM    BUN 19 07/28/2019 06:12 AM    Creatinine 0.94 07/28/2019 06:12 AM    BUN/Creatinine ratio 20 07/28/2019 06:12 AM    GFR est AA >60 07/28/2019 06:12 AM    GFR est non-AA 59 (L) 07/28/2019 06:12 AM    Calcium 9.3 07/28/2019 06:12 AM     Lab Results   Component Value Date/Time    WBC 8.7 07/24/2019 09:12 AM    HGB 11.5 07/24/2019 09:12 AM    HCT 36.3 07/24/2019 09:12 AM    PLATELET 881 26/38/5053 09:12 AM    MCV 94.5 07/24/2019 09:12 AM     Lab Results   Component Value Date/Time    MCH 29.9 07/24/2019 09:12 AM    MCHC 31.7 07/24/2019 09:12 AM    BASOPHILS 0 07/24/2019 09:12 AM    ABS. LYMPHOCYTES 1.5 07/24/2019 09:12 AM    ABS. MONOCYTES 0.7 07/24/2019 09:12 AM    ABS. EOSINOPHILS 0.0 07/24/2019 09:12 AM    ABS. BASOPHILS 0.0 07/24/2019 09:12 AM    Phosphorus 4.6 07/15/2019 03:45 AM    Magnesium 1.5 (L) 07/15/2019 03:45 AM     IMAGING:    Ir Angio Carotid/cervical Bi    Result Date: 7/21/2019  IMPRESSION: 1. No obvious vascular abnormality to account for the patient's subarachnoid hemorrhage. However, the eccentricity of the right PComA dome, and the tiny triangular outpouching from the right proximal NGUYEN A2 segment are unusual and need to be restudied. 2.  Occluded left ICA bulb with reconstitution via vasovasorum. The distal cervical ICA, petrous segment and proximal cavernous segment are irregular with areas of narrowing. The ICA tapers distal to the ophthalmic origin and is washed out from PComA supply. 3.  The distal left ICA and the left MCA and branches are supplied by left PComA and AComA collateral flow. 4.  50% stenosis of right ICA origin by calcified plaque. PLAN:  Repeat angiogram in 3-5 days Pilo Porteous, M.D. Attending, [facility]    Ct Head Wo Cont    Result Date: 7/22/2019  IMPRESSION: Decreased subarachnoid hemorrhage. No new intracranial hemorrhage.       Ct Head Wo Cont    Result Date: 7/20/2019  IMPRESSION: 1. Stable small to moderate volume subarachnoid hemorrhage as described above, largest components in the suprasellar cistern, prepontine cistern, and left sylvian fissure. Ct Chest Wo Cont    Result Date: 7/19/2019  IMPRESSION: No acute process identified in the chest, abdomen or pelvis. There is no evidence of acute fracture or dislocation. Ct Abd Pelv Wo Cont    Result Date: 7/19/2019  IMPRESSION: No acute process identified in the chest, abdomen or pelvis. There is no evidence of acute fracture or dislocation. Ct Code Neuro Head Wo Contrast    Result Date: 7/19/2019  IMPRESSION: Acute subarachnoid hemorrhage in the left sylvian fissure and basal cisterns. No evidence for acute infarct. The findings were discussed with Dr. Sujata Don on 7/19/2019 at 1555 hours by Dr. Di Izaguirre. [id number] [facility]    Result Date: 7/19/2019  IMPRESSION: Severe atherosclerotic change in the cervical internal carotid artery. Greater than 90% stenosis of the proximal left internal carotid artery. Moderate stenosis right internal carotid artery approximately 50%. No intracranial aneurysm. Minimal irregularity of M2 segments may be related to mild vasospasm. Stable degree of subarachnoid hemorrhage when compared to the CT of the head performed earlier in the day. The findings were called to Dr. Thompson Jennings on 7/19/2019 at 4:44 PM by Dr. Lyudmila De La O. [id number]     Assessment/Plan:       ICD-10-CM ICD-9-CM    1. Non-aneurysmal perimesencephalic subarachnoid hemorrhage (HCC) I60.8 430 MRA BRAIN WO CONT      DUPLEX CAROTID RIGHT   2. Internal carotid artery stenosis, left I65.22 433.10 ASPIRIN TEST     Encounter Diagnoses   Name Primary?     Non-aneurysmal perimesencephalic subarachnoid hemorrhage (HCC) Yes    Internal carotid artery stenosis, left      Orders Placed This Encounter    MRA BRAIN WO CONT    ASPIRIN TEST ([facility] Only)    hydroCHLOROthiazide (MICROZIDE) 12.5 mg capsule Plan: LICA stenosis   - Pt instructed to increase ASA dose to 162 mg daily for thromboembolic stroke prevention   - Left carotid duplex in 1 year for surveillance   - ASA lab test ordered for today    Hx Non-aneurysmal perimesencephalic subarachnoid hemorrhage   - F/U MRA in 1 year for surveillance of infundibulum noted in right PComA infundibulum and right NGUYEN on cerebral angiogram   - Instructed patient that she does not need to be taking Keppra anymore as it was for seizure ppx in the acute bleed phase         - best medical therapy for stroke prevention utilized and should be continued for life     - signs and sx of stroke discussed and the importance of the activation of EMS, patient verbalized understanding     - patient given opportunity to review images and ask questions , with information provided. Follow-up Disposition:    I have discussed the diagnosis with the patient and the intended plan as seen in the above orders. Patient is in agreement. The patient has received an after-visit summary and questions were answered concerning future plans. I have discussed medication side effects and warnings with the patient as well. I have spent 40 minutes involved in lab review, imaging review, patient education, family decision making and documentation. During this entire length of time I was immediately available to the patient.     Mathew Lyman NP

## 2019-09-30 NOTE — PATIENT INSTRUCTIONS
Instructions:  - Increase daily ASA dose to 162 mg daily (2 baby aspirin pills)  - Asprin function test ordered for today, please go to outpatient lab to have this drawn  - You will need an MRA of your brain in 1 year   - You will need a doppler of your left carotid artery in 1 year   - You do not need to take Keppra any more    A Healthy Lifestyle: Care Instructions  Your Care Instructions    A healthy lifestyle can help you feel good, stay at a healthy weight, and have plenty of energy for both work and play. A healthy lifestyle is something you can share with your whole family. A healthy lifestyle also can lower your risk for serious health problems, such as high blood pressure, heart disease, and diabetes. You can follow a few steps listed below to improve your health and the health of your family. Follow-up care is a key part of your treatment and safety. Be sure to make and go to all appointments, and call your doctor if you are having problems. It's also a good idea to know your test results and keep a list of the medicines you take. How can you care for yourself at home? · Do not eat too much sugar, fat, or fast foods. You can still have dessert and treats now and then. The goal is moderation. · Start small to improve your eating habits. Pay attention to portion sizes, drink less juice and soda pop, and eat more fruits and vegetables. ? Eat a healthy amount of food. A 3-ounce serving of meat, for example, is about the size of a deck of cards. Fill the rest of your plate with vegetables and whole grains. ? Limit the amount of soda and sports drinks you have every day. Drink more water when you are thirsty. ? Eat at least 5 servings of fruits and vegetables every day. It may seem like a lot, but it is not hard to reach this goal. A serving or helping is 1 piece of fruit, 1 cup of vegetables, or 2 cups of leafy, raw vegetables.  Have an apple or some carrot sticks as an afternoon snack instead of a candy bar. Try to have fruits and/or vegetables at every meal.  · Make exercise part of your daily routine. You may want to start with simple activities, such as walking, bicycling, or slow swimming. Try to be active 30 to 60 minutes every day. You do not need to do all 30 to 60 minutes all at once. For example, you can exercise 3 times a day for 10 or 20 minutes. Moderate exercise is safe for most people, but it is always a good idea to talk to your doctor before starting an exercise program.  · Keep moving. [employer] the lawn, work in the garden, or [employer]. Take the stairs instead of the elevator at work. · If you smoke, quit. People who smoke have an increased risk for heart attack, stroke, cancer, and other lung illnesses. Quitting is hard, but there are ways to boost your chance of quitting tobacco for good. ? Use nicotine gum, patches, or lozenges. ? Ask your doctor about stop-smoking programs and medicines. ? Keep trying. In addition to reducing your risk of diseases in the future, you will notice some benefits soon after you stop using tobacco. If you have shortness of breath or asthma symptoms, they will likely get better within a few weeks after you quit. · Limit how much alcohol you drink. Moderate amounts of alcohol (up to 2 drinks a day for men, 1 drink a day for women) are okay. But drinking too much can lead to liver problems, high blood pressure, and other health problems. Family health  If you have a family, there are many things you can do together to improve your health. · Eat meals together as a family as often as possible. · Eat healthy foods. This includes fruits, vegetables, lean meats and dairy, and whole grains. · Include your family in your fitness plan. Most people think of activities such as jogging or tennis as the way to fitness, but there are many ways you and your family can be more active. Anything that makes you breathe hard and gets your heart pumping is exercise. Here are some tips:  ? Walk to do errands or to take your child to school or the bus.  ? Go for a family bike ride after dinner instead of watching TV. Where can you learn more? Go to http://meliton-iman.info/. Enter J902 in the search box to learn more about \"A Healthy Lifestyle: Care Instructions. \"  Current as of: May 28, 2019  Content Version: 12.2  © 5026-4339 [employer]. Care instructions adapted under license by [employer] (which disclaims liability or warranty for this information). If you have questions about a medical condition or this instruction, always ask your healthcare professional. [address] any warranty or liability for your use of this information.

## 2019-09-30 NOTE — PROGRESS NOTES
New hospital follow up presenting with s/p SAH. Patient denies headaches, dizziness, sensitivities to light, nausea and numbness and tingling. Reports occasional unsteady gait. Uses cane during ambulation. No acute problems reported.

## 2020-09-09 ENCOUNTER — HOSPITAL ENCOUNTER (EMERGENCY)
Age: 70
Discharge: STILL ADMITTED OR ADMINISTRATIVE | End: 2020-09-09
Attending: EMERGENCY MEDICINE